# Patient Record
Sex: FEMALE | HISPANIC OR LATINO | Employment: UNEMPLOYED | URBAN - METROPOLITAN AREA
[De-identification: names, ages, dates, MRNs, and addresses within clinical notes are randomized per-mention and may not be internally consistent; named-entity substitution may affect disease eponyms.]

---

## 2017-01-19 DIAGNOSIS — Z01.812 PRE-OPERATIVE LABORATORY EXAMINATION: ICD-10-CM

## 2017-01-19 LAB
ANION GAP SERPL CALC-SCNC: 8 MMOL/L (ref 0–11.9)
BUN SERPL-MCNC: 11 MG/DL (ref 8–22)
CALCIUM SERPL-MCNC: 9.7 MG/DL (ref 8.4–10.2)
CHLORIDE SERPL-SCNC: 105 MMOL/L (ref 96–112)
CO2 SERPL-SCNC: 28 MMOL/L (ref 20–33)
CREAT SERPL-MCNC: 0.82 MG/DL (ref 0.5–1.4)
ERYTHROCYTE [DISTWIDTH] IN BLOOD BY AUTOMATED COUNT: 42 FL (ref 35.9–50)
GFR SERPL CREATININE-BSD FRML MDRD: >60 ML/MIN/1.73 M 2
GLUCOSE SERPL-MCNC: 91 MG/DL (ref 65–99)
HCT VFR BLD AUTO: 48.5 % (ref 37–47)
HGB BLD-MCNC: 16.7 G/DL (ref 12–16)
MCH RBC QN AUTO: 29.4 PG (ref 27–33)
MCHC RBC AUTO-ENTMCNC: 34.4 G/DL (ref 33.6–35)
MCV RBC AUTO: 85.4 FL (ref 81.4–97.8)
PLATELET # BLD AUTO: 221 K/UL (ref 164–446)
PMV BLD AUTO: 10 FL (ref 9–12.9)
POTASSIUM SERPL-SCNC: 4.2 MMOL/L (ref 3.6–5.5)
RBC # BLD AUTO: 5.68 M/UL (ref 4.2–5.4)
SODIUM SERPL-SCNC: 141 MMOL/L (ref 135–145)
WBC # BLD AUTO: 7.8 K/UL (ref 4.8–10.8)

## 2017-02-01 PROBLEM — H02.402 PTOSIS OF LEFT EYELID: Status: ACTIVE | Noted: 2017-02-01

## 2017-06-20 ENCOUNTER — HOSPITAL ENCOUNTER (OUTPATIENT)
Facility: MEDICAL CENTER | Age: 57
End: 2017-06-21
Attending: EMERGENCY MEDICINE | Admitting: INTERNAL MEDICINE
Payer: COMMERCIAL

## 2017-06-20 ENCOUNTER — APPOINTMENT (OUTPATIENT)
Dept: RADIOLOGY | Facility: MEDICAL CENTER | Age: 57
End: 2017-06-20
Attending: EMERGENCY MEDICINE
Payer: COMMERCIAL

## 2017-06-20 ENCOUNTER — RESOLUTE PROFESSIONAL BILLING HOSPITAL PROF FEE (OUTPATIENT)
Dept: HOSPITALIST | Facility: MEDICAL CENTER | Age: 57
End: 2017-06-20
Payer: COMMERCIAL

## 2017-06-20 DIAGNOSIS — H53.2 DIPLOPIA: ICD-10-CM

## 2017-06-20 DIAGNOSIS — R51.9 ACUTE NONINTRACTABLE HEADACHE, UNSPECIFIED HEADACHE TYPE: ICD-10-CM

## 2017-06-20 PROBLEM — G45.9 TIA (TRANSIENT ISCHEMIC ATTACK): Status: ACTIVE | Noted: 2017-06-20

## 2017-06-20 LAB
ALBUMIN SERPL BCP-MCNC: 3.9 G/DL (ref 3.2–4.9)
ALBUMIN/GLOB SERPL: 1.2 G/DL
ALP SERPL-CCNC: 48 U/L (ref 30–99)
ALT SERPL-CCNC: 68 U/L (ref 2–50)
ANION GAP SERPL CALC-SCNC: 10 MMOL/L (ref 0–11.9)
AST SERPL-CCNC: 35 U/L (ref 12–45)
BASOPHILS # BLD AUTO: 0.6 % (ref 0–1.8)
BASOPHILS # BLD: 0.05 K/UL (ref 0–0.12)
BILIRUB SERPL-MCNC: 0.6 MG/DL (ref 0.1–1.5)
BUN SERPL-MCNC: 18 MG/DL (ref 8–22)
CALCIUM SERPL-MCNC: 9.1 MG/DL (ref 8.4–10.2)
CHLORIDE SERPL-SCNC: 107 MMOL/L (ref 96–112)
CO2 SERPL-SCNC: 25 MMOL/L (ref 20–33)
CREAT SERPL-MCNC: 0.87 MG/DL (ref 0.5–1.4)
EOSINOPHIL # BLD AUTO: 0.35 K/UL (ref 0–0.51)
EOSINOPHIL NFR BLD: 4.4 % (ref 0–6.9)
ERYTHROCYTE [DISTWIDTH] IN BLOOD BY AUTOMATED COUNT: 44.5 FL (ref 35.9–50)
GFR SERPL CREATININE-BSD FRML MDRD: >60 ML/MIN/1.73 M 2
GLOBULIN SER CALC-MCNC: 3.2 G/DL (ref 1.9–3.5)
GLUCOSE SERPL-MCNC: 91 MG/DL (ref 65–99)
HCT VFR BLD AUTO: 47.6 % (ref 37–47)
HGB BLD-MCNC: 15.9 G/DL (ref 12–16)
IMM GRANULOCYTES # BLD AUTO: 0.01 K/UL (ref 0–0.11)
IMM GRANULOCYTES NFR BLD AUTO: 0.1 % (ref 0–0.9)
LYMPHOCYTES # BLD AUTO: 3.89 K/UL (ref 1–4.8)
LYMPHOCYTES NFR BLD: 49.4 % (ref 22–41)
MCH RBC QN AUTO: 29 PG (ref 27–33)
MCHC RBC AUTO-ENTMCNC: 33.4 G/DL (ref 33.6–35)
MCV RBC AUTO: 86.7 FL (ref 81.4–97.8)
MONOCYTES # BLD AUTO: 0.53 K/UL (ref 0–0.85)
MONOCYTES NFR BLD AUTO: 6.7 % (ref 0–13.4)
NEUTROPHILS # BLD AUTO: 3.04 K/UL (ref 2–7.15)
NEUTROPHILS NFR BLD: 38.8 % (ref 44–72)
NRBC # BLD AUTO: 0 K/UL
NRBC BLD AUTO-RTO: 0 /100 WBC
PLATELET # BLD AUTO: 183 K/UL (ref 164–446)
PMV BLD AUTO: 10.2 FL (ref 9–12.9)
POTASSIUM SERPL-SCNC: 3.8 MMOL/L (ref 3.6–5.5)
PROT SERPL-MCNC: 7.1 G/DL (ref 6–8.2)
RBC # BLD AUTO: 5.49 M/UL (ref 4.2–5.4)
SODIUM SERPL-SCNC: 142 MMOL/L (ref 135–145)
TROPONIN I SERPL-MCNC: <0.02 NG/ML (ref 0–0.04)
TSH SERPL DL<=0.005 MIU/L-ACNC: 0.43 UIU/ML (ref 0.35–5.5)
WBC # BLD AUTO: 7.9 K/UL (ref 4.8–10.8)

## 2017-06-20 PROCEDURE — 84443 ASSAY THYROID STIM HORMONE: CPT

## 2017-06-20 PROCEDURE — 99220 PR INITIAL OBSERVATION CARE,LEVL III: CPT | Performed by: INTERNAL MEDICINE

## 2017-06-20 PROCEDURE — 96374 THER/PROPH/DIAG INJ IV PUSH: CPT

## 2017-06-20 PROCEDURE — 96375 TX/PRO/DX INJ NEW DRUG ADDON: CPT

## 2017-06-20 PROCEDURE — 80053 COMPREHEN METABOLIC PANEL: CPT

## 2017-06-20 PROCEDURE — 85025 COMPLETE CBC W/AUTO DIFF WBC: CPT

## 2017-06-20 PROCEDURE — 84484 ASSAY OF TROPONIN QUANT: CPT

## 2017-06-20 PROCEDURE — 70450 CT HEAD/BRAIN W/O DYE: CPT

## 2017-06-20 PROCEDURE — 36415 COLL VENOUS BLD VENIPUNCTURE: CPT

## 2017-06-20 PROCEDURE — 700111 HCHG RX REV CODE 636 W/ 250 OVERRIDE (IP): Performed by: EMERGENCY MEDICINE

## 2017-06-20 PROCEDURE — G0378 HOSPITAL OBSERVATION PER HR: HCPCS

## 2017-06-20 PROCEDURE — 700105 HCHG RX REV CODE 258: Performed by: INTERNAL MEDICINE

## 2017-06-20 PROCEDURE — 93005 ELECTROCARDIOGRAM TRACING: CPT | Performed by: EMERGENCY MEDICINE

## 2017-06-20 PROCEDURE — 99285 EMERGENCY DEPT VISIT HI MDM: CPT

## 2017-06-20 RX ORDER — LEVOTHYROXINE SODIUM 0.12 MG/1
125 TABLET ORAL
Status: DISCONTINUED | OUTPATIENT
Start: 2017-06-21 | End: 2017-06-21 | Stop reason: HOSPADM

## 2017-06-20 RX ORDER — MORPHINE SULFATE 4 MG/ML
4 INJECTION, SOLUTION INTRAMUSCULAR; INTRAVENOUS ONCE
Status: COMPLETED | OUTPATIENT
Start: 2017-06-20 | End: 2017-06-20

## 2017-06-20 RX ORDER — HYDROCODONE BITARTRATE AND ACETAMINOPHEN 5; 325 MG/1; MG/1
1 TABLET ORAL EVERY 6 HOURS PRN
Status: DISCONTINUED | OUTPATIENT
Start: 2017-06-20 | End: 2017-06-21 | Stop reason: HOSPADM

## 2017-06-20 RX ORDER — KETOROLAC TROMETHAMINE 30 MG/ML
15 INJECTION, SOLUTION INTRAMUSCULAR; INTRAVENOUS EVERY 6 HOURS PRN
Status: DISCONTINUED | OUTPATIENT
Start: 2017-06-20 | End: 2017-06-21

## 2017-06-20 RX ORDER — ONDANSETRON 2 MG/ML
4 INJECTION INTRAMUSCULAR; INTRAVENOUS ONCE
Status: COMPLETED | OUTPATIENT
Start: 2017-06-20 | End: 2017-06-20

## 2017-06-20 RX ORDER — POLYETHYLENE GLYCOL 3350 17 G/17G
1 POWDER, FOR SOLUTION ORAL
Status: DISCONTINUED | OUTPATIENT
Start: 2017-06-20 | End: 2017-06-21 | Stop reason: HOSPADM

## 2017-06-20 RX ORDER — ASPIRIN 325 MG
325 TABLET ORAL DAILY
Status: DISCONTINUED | OUTPATIENT
Start: 2017-06-21 | End: 2017-06-21 | Stop reason: HOSPADM

## 2017-06-20 RX ORDER — SODIUM CHLORIDE 9 MG/ML
INJECTION, SOLUTION INTRAVENOUS CONTINUOUS
Status: DISCONTINUED | OUTPATIENT
Start: 2017-06-20 | End: 2017-06-21 | Stop reason: HOSPADM

## 2017-06-20 RX ORDER — BISACODYL 10 MG
10 SUPPOSITORY, RECTAL RECTAL
Status: DISCONTINUED | OUTPATIENT
Start: 2017-06-20 | End: 2017-06-21 | Stop reason: HOSPADM

## 2017-06-20 RX ORDER — AMOXICILLIN 250 MG
2 CAPSULE ORAL 2 TIMES DAILY
Status: DISCONTINUED | OUTPATIENT
Start: 2017-06-20 | End: 2017-06-21 | Stop reason: HOSPADM

## 2017-06-20 RX ORDER — HEPARIN SODIUM 5000 [USP'U]/ML
5000 INJECTION, SOLUTION INTRAVENOUS; SUBCUTANEOUS EVERY 8 HOURS
Status: DISCONTINUED | OUTPATIENT
Start: 2017-06-21 | End: 2017-06-21 | Stop reason: HOSPADM

## 2017-06-20 RX ORDER — ATORVASTATIN CALCIUM 40 MG/1
20 TABLET, FILM COATED ORAL
Status: DISCONTINUED | OUTPATIENT
Start: 2017-06-20 | End: 2017-06-21 | Stop reason: HOSPADM

## 2017-06-20 RX ADMIN — ONDANSETRON 4 MG: 2 INJECTION INTRAMUSCULAR; INTRAVENOUS at 20:15

## 2017-06-20 RX ADMIN — SODIUM CHLORIDE: 9 INJECTION, SOLUTION INTRAVENOUS at 21:45

## 2017-06-20 RX ADMIN — MORPHINE SULFATE 4 MG: 4 INJECTION INTRAVENOUS at 20:15

## 2017-06-20 ASSESSMENT — PAIN SCALES - GENERAL
PAINLEVEL_OUTOF10: 7
PAINLEVEL_OUTOF10: 5

## 2017-06-20 NOTE — IP AVS SNAPSHOT
" Home Care Instructions                                                                                                                  Name:Ashanti Arvizu  Medical Record Number:[id number]  CSN: [id number]    YOB: 1960   Age: 57 y.o.  Sex: female  HT:1.575 m (5' 2\") WT: 98.7 kg (217 lb 9.5 oz)          Admit Date: 6/20/2017     Discharge Date:   Today's Date: 6/21/2017  Attending Doctor:  Wilner Meng*                  Allergies:  Sulfa drugs; Lasix; and Tape            Discharge Instructions       Discharge Instructions    Discharged to home by car with relative. Discharged via wheelchair, hospital escort: Yes.  Special equipment needed: Not Applicable    Be sure to schedule a follow-up appointment with your primary care doctor or any specialists as instructed.     Discharge Plan:   Diet Plan: Discussed  Activity Level: Discussed  Smoking Cessation Offered: Patient Refused  Confirmed Follow up Appointment: Patient to Call and Schedule Appointment  Confirmed Symptoms Management: Discussed  Medication Reconciliation Updated: Yes  Influenza Vaccine Indication: Patient Refuses    I understand that a diet low in cholesterol, fat, and sodium is recommended for good health. Unless I have been given specific instructions below for another diet, I accept this instruction as my diet prescription.   Diplopia  Diplopia is the condition of having double vision or seeing two of a single object. There are many causes of diplopia. Some are not dangerous and can be easily corrected. Diplopia may also be a symptom of a serious medical problem.  There are two types of diplopia.  · Monocular diplopia. This is double vision that affects only one eye. Monocular diplopia is often caused by a clouding of the lens in your eye (cataract) or by disruptions in the way that your eye focuses light.  · Binocular diplopia. This is double vision that affects both eyes. However, when you shut one eye, the double vision will go " away. Binocular diplopia may be more serious. It can be caused by:  ¨ Problems with the nerves or muscles that are responsible for eye movement.  ¨ Neurologic diseases.  ¨ Thyroid problems.  ¨ Tumors.  ¨ An infection near your eyes.  ¨ A stroke.  You may need to see a health care provider who specializes in eye conditions (ophthalmologist) or a nerve specialist (neurologist) to find the cause.  HOME CARE INSTRUCTIONS  · Tell your health care provider about any changes in your vision.  · Do not drive or operate heavy machinery if diplopia interferes with your vision.  · Keep all follow-up visits as directed by your health care provider. This is important.  SEEK MEDICAL CARE IF:  · Your diplopia gets worse.  · You develop any other symptoms along with your diplopia, such as:  ¨ Weakness.  ¨ Numbness.  ¨ Headache.  ¨ Eye pain.  ¨ Clumsiness.  ¨ Nausea.  ¨ Drooping eyelids.  ¨ Abnormal movement of one of your eyes.  SEEK IMMEDIATE MEDICAL CARE IF:  · You have sudden vision loss.  · You suddenly get a very bad headache.  · You have sudden weakness or numbness.  · You suddenly lose the ability to speak, understand speech, or both.     This information is not intended to replace advice given to you by your health care provider. Make sure you discuss any questions you have with your health care provider.     Document Released: 10/19/2005 Document Revised: 05/03/2016 Document Reviewed: 11/11/2015  [employer] Interactive Patient Education ©2016 [employer]    · Is patient discharged on Warfarin / Coumadin?   No     · Is patient Post Blood Transfusion?  No    Depression / Suicide Risk    As you are discharged from this [facility] facility, it is important to learn how to keep safe from harming yourself.    Recognize the warning signs:  · Abrupt changes in personality, positive or negative- including increase in energy   · Giving away possessions  · Change in eating patterns- significant weight changes-  positive or  negative  · Change in sleeping patterns- unable to sleep or sleeping all the time   · Unwillingness or inability to communicate  · Depression  · Unusual sadness, discouragement and loneliness  · Talk of wanting to die  · Neglect of personal appearance   · Rebelliousness- reckless behavior  · Withdrawal from people/activities they love  · Confusion- inability to concentrate     If you or a loved one observes any of these behaviors or has concerns about self-harm, here's what you can do:  · Talk about it- your feelings and reasons for harming yourself  · Remove any means that you might use to hurt yourself (examples: pills, rope, extension cords, firearm)  · Get professional help from the community (Mental Health, Substance Abuse, psychological counseling)  · Do not be alone:Call your Safe Contact- someone whom you trust who will be there for you.  · Call your local CRISIS HOTLINE 516-4023 or 629-357-0154  · Call your local Children's Mobile Crisis Response Team [place] (604) 941-1665 or www.[employer]  · Call the toll free National Suicide Prevention Hotlines   · National Suicide Prevention Lifeline 142-957-HUIG (4056)  · National Hope Line Network 800-SUICIDE (781-6334)        Follow-up Information     1. Follow up with Jamaal Jarquin M.D. In 2 weeks.    Specialty:  Neurology    Why:  Per Dr. Jarquin's office pt to call stating needing hospital follow up and they will open an appointment to fit you in sooner than if [facility] schedules for you    Contact information    [address]  [address]  [place] [address] [phone]          2. Follow up with Jennifer Cardoso D.O.. Go on 6/28/2017.    Specialty:  Family Medicine    Why:  Please arrive at 2:50pm for your appointment.    Contact information    [address]  [address]  [place] [address] [phone]           Discharge Medication Instructions:    Below are the medications your physician expects you to take upon discharge:    Review all  your home medications and newly ordered medications with your doctor and/or pharmacist. Follow medication instructions as directed by your doctor and/or pharmacist.    Please keep your medication list with you and share with your physician.               Medication List      CONTINUE taking these medications        Instructions    Morning Afternoon Evening Bedtime    alprazolam 0.5 MG Tabs   Commonly known as:  XANAX        Take 0.5 mg by mouth at bedtime as needed for Sleep.   Dose:  0.5 mg                        fish oil 1000 MG Caps capsule        Take 1,000 mg by mouth every day.   Dose:  1000 mg                        flurazepam 30 MG capsule   Commonly known as:  DALMANE        Take 30 mg by mouth at bedtime as needed for Sleep.   Dose:  30 mg                        levothyroxine 125 MCG Tabs   Last time this was given:  125 mcg on 6/21/2017 10:54 AM   Commonly known as:  SYNTHROID        Take 125 mcg by mouth Every morning on an empty stomach.   Dose:  125 mcg                        nitrofurantoin 50 MG Caps   Commonly known as:  MACRODANTIN        Take 50 mg by mouth as needed.   Dose:  50 mg                        VITAMIN B COMPLEX PO        Take 1 Tab by mouth every day.   Dose:  1 Tab                        vitamin D 1000 UNIT Tabs   Commonly known as:  cholecalciferol        Take 1,000 Units by mouth every day.   Dose:  1000 Units                                Instructions           Diet / Nutrition:    Follow any diet instructions given to you by your doctor or the dietician, including how much salt (sodium) you are allowed each day.    If you are overweight, talk to your doctor about a weight reduction plan.    Activity:    Remain physically active following your doctor's instructions about exercise and activity.    Rest often.     Any time you become even a little tired or short of breath, SIT DOWN and rest.    Worsening Symptoms:    Report any of the following signs and symptoms to the doctor's  office immediately:    *Pain of jaw, arm, or neck  *Chest pain not relieved by medication                               *Dizziness or loss of consciousness  *Difficulty breathing even when at rest   *More tired than usual                                       *Bleeding drainage or swelling of surgical site  *Swelling of feet, ankles, legs or stomach                 *Fever (>100ºF)  *Pink or blood tinged sputum  *Weight gain (3lbs/day or 5lbs /week)           *Shock from internal defibrillator (if applicable)  *Palpitations or irregular heartbeats                *Cool and/or numb extremities    Stroke Awareness    Common Risk Factors for Stroke include:    Age  Atrial Fibrillation  Carotid Artery Stenosis  Diabetes Mellitus  Excessive alcohol consumption  High blood pressure  Overweight   Physical inactivity  Smoking    Warning signs and symptoms of a stroke include:    *Sudden numbness or weakness of the face, arm or leg (especially on one side of the body).  *Sudden confusion, trouble speaking or understanding.  *Sudden trouble seeing in one or both eyes.  *Sudden trouble walking, dizziness, loss of balance or coordination.Sudden severe headache with no known cause.    It is very important to get treatment quickly when a stroke occurs. If you experience any of the above warning signs, call 911 immediately.                   Disclaimer         Quit Smoking / Tobacco Use:    I understand the use of any tobacco products increases my chance of suffering from future heart disease or stroke and could cause other illnesses which may shorten my life. Quitting the use of tobacco products is the single most important thing I can do to improve my health. For further information on smoking / tobacco cessation call a Toll Free Quit Line at 1-978.831.5167 (*[employer] [place]) or 1-800.843.8478 (American Lung Association) or you can access the web based program at www.lungusa.org.    [place] Tobacco Users Help Line:  (913)  878-3052       Toll Free: 4-119-700-2462  Quit Tobacco Program [facility] (793)298-8492    Crisis Hotline:    [place] Crisis Hotline:  7-095-SXTFNCK or 1-584.943.3571    [place] Crisis Hotline:    1-453.285.8252 or 937-510-7979    Discharge Survey:   Thank you for choosing [facility]. We hope we did everything we could to make your hospital stay a pleasant one. You may be receiving a phone survey and we would appreciate your time and participation in answering the questions. Your input is very valuable to us in our efforts to improve our service to our patients and their families.        My signature on this form indicates that:    1. I have reviewed and understand the above information.  2. My questions regarding this information have been answered to my satisfaction.  3. I have formulated a plan with my discharge nurse to obtain my prescribed medications for home.                  Disclaimer         __________________________________                     __________       ________                       Patient Signature                                                 Date                    Time

## 2017-06-20 NOTE — IP AVS SNAPSHOT
" <p align=\"LEFT\"><IMG SRC=\"//[email or web address]$/[email or web address]\" alt=\"Image\" WIDTH=\"50%\" HEIGHT=\"200\" BORDER=\"\"></p>                   Name:Ashanti Arvizu  Medical Record Number:[id number]  CSN: [id number]    YOB: 1960   Age: 57 y.o.  Sex: female  HT:1.575 m (5' 2\") WT: 98.7 kg (217 lb 9.5 oz)          Admit Date: 6/20/2017     Discharge Date:   Today's Date: 6/21/2017  Attending Doctor:  Wilner Meng*                  Allergies:  Sulfa drugs; Lasix; and Tape          Follow-up Information     1. Follow up with Jamaal Jarquin M.D. In 2 weeks.    Specialty:  Neurology    Why:  Per Dr. Jarquin's office pt to call stating needing hospital follow up and they will open an appointment to fit you in sooner than if [facility] schedules for you    Contact information    [address]  [address]  [place] [address] [phone]          2. Follow up with Jennifer Cardoso D.O.. Go on 6/28/2017.    Specialty:  Family Medicine    Why:  Please arrive at 2:50pm for your appointment.    Contact information    [address]  [address]  [place] [address] [phone]           Medication List      Take these Medications        Instructions    alprazolam 0.5 MG Tabs   Commonly known as:  XANAX    Take 0.5 mg by mouth at bedtime as needed for Sleep.   Dose:  0.5 mg       fish oil 1000 MG Caps capsule    Take 1,000 mg by mouth every day.   Dose:  1000 mg       flurazepam 30 MG capsule   Commonly known as:  DALMANE    Take 30 mg by mouth at bedtime as needed for Sleep.   Dose:  30 mg       levothyroxine 125 MCG Tabs   Commonly known as:  SYNTHROID    Take 125 mcg by mouth Every morning on an empty stomach.   Dose:  125 mcg       nitrofurantoin 50 MG Caps   Commonly known as:  MACRODANTIN    Take 50 mg by mouth as needed.   Dose:  50 mg       VITAMIN B COMPLEX PO    Take 1 Tab by mouth every day.   Dose:  1 Tab       vitamin D 1000 UNIT Tabs   Commonly known as:  cholecalciferol    Take 1,000 Units by mouth every day.   "   Dose:  1000 Units

## 2017-06-20 NOTE — IP AVS SNAPSHOT
HSTYLE Access Code: [email or web address]  Expires: 7/13/2017  4:31 AM    HSTYLE  A secure, online tool to manage your health information     [employer] HSTYLE® is a secure, online tool that connects you to your personalized health information from the privacy of your home -- day or night - making it very easy for you to manage your healthcare. Once the activation process is completed, you can even access your medical information using the HSTYLE tiffanie, which is available for free in the Apple Tiffanie store or Google Play store.     HSTYLE provides the following levels of access (as shown below):   My Chart Features   [facility] Primary Care Doctor [facility]  Specialists [facility]  Urgent  Care Non-[facility]  Primary Care  Doctor   Email your healthcare team securely and privately 24/7 X X X X   Manage appointments: schedule your next appointment; view details of past/upcoming appointments X      Request prescription refills. X      View recent personal medical records, including lab and immunizations X X X X   View health record, including health history, allergies, medications X X X X   Read reports about your outpatient visits, procedures, consult and ER notes X X X X   See your discharge summary, which is a recap of your hospital and/or ER visit that includes your diagnosis, lab results, and care plan. X X       How to register for HSTYLE:  1. Go to  [email or web address].  2. Click on the Sign Up Now box, which takes you to the New Member Sign Up page. You will need to provide the following information:  a. Enter your HSTYLE Access Code exactly as it appears at the top of this page. (You will not need to use this code after you’ve completed the sign-up process. If you do not sign up before the expiration date, you must request a new code.)   b. Enter your date of birth.   c. Enter your home email address.   d. Click Submit, and follow the next screen’s instructions.  3. Create a HSTYLE ID. This will be your HSTYLE  login ID and cannot be changed, so think of one that is secure and easy to remember.  4. Create a [employer] password. You can change your password at any time.  5. Enter your Password Reset Question and Answer. This can be used at a later time if you forget your password.   6. Enter your e-mail address. This allows you to receive e-mail notifications when new information is available in [employer].  7. Click Sign Up. You can now view your health information.    For assistance activating your [email or web address] account, call [phone]

## 2017-06-21 ENCOUNTER — PATIENT OUTREACH (OUTPATIENT)
Dept: HEALTH INFORMATION MANAGEMENT | Facility: OTHER | Age: 57
End: 2017-06-21

## 2017-06-21 ENCOUNTER — APPOINTMENT (OUTPATIENT)
Dept: RADIOLOGY | Facility: MEDICAL CENTER | Age: 57
End: 2017-06-21
Attending: INTERNAL MEDICINE
Payer: COMMERCIAL

## 2017-06-21 VITALS
HEIGHT: 62 IN | BODY MASS INDEX: 40.04 KG/M2 | DIASTOLIC BLOOD PRESSURE: 51 MMHG | RESPIRATION RATE: 16 BRPM | OXYGEN SATURATION: 94 % | HEART RATE: 66 BPM | TEMPERATURE: 97.5 F | SYSTOLIC BLOOD PRESSURE: 100 MMHG | WEIGHT: 217.59 LBS

## 2017-06-21 LAB
ALBUMIN SERPL BCP-MCNC: 3.5 G/DL (ref 3.2–4.9)
ALBUMIN/GLOB SERPL: 1.3 G/DL
ALP SERPL-CCNC: 43 U/L (ref 30–99)
ALT SERPL-CCNC: 62 U/L (ref 2–50)
ANION GAP SERPL CALC-SCNC: 8 MMOL/L (ref 0–11.9)
AST SERPL-CCNC: 37 U/L (ref 12–45)
BILIRUB SERPL-MCNC: 0.6 MG/DL (ref 0.1–1.5)
BUN SERPL-MCNC: 18 MG/DL (ref 8–22)
CALCIUM SERPL-MCNC: 8.5 MG/DL (ref 8.4–10.2)
CHLORIDE SERPL-SCNC: 109 MMOL/L (ref 96–112)
CHOLEST SERPL-MCNC: 203 MG/DL (ref 100–199)
CO2 SERPL-SCNC: 26 MMOL/L (ref 20–33)
CREAT SERPL-MCNC: 0.81 MG/DL (ref 0.5–1.4)
ERYTHROCYTE [DISTWIDTH] IN BLOOD BY AUTOMATED COUNT: 45.1 FL (ref 35.9–50)
ERYTHROCYTE [SEDIMENTATION RATE] IN BLOOD BY WESTERGREN METHOD: 17 MM/HOUR (ref 0–30)
GFR SERPL CREATININE-BSD FRML MDRD: >60 ML/MIN/1.73 M 2
GLOBULIN SER CALC-MCNC: 2.8 G/DL (ref 1.9–3.5)
GLUCOSE SERPL-MCNC: 101 MG/DL (ref 65–99)
HCT VFR BLD AUTO: 43.5 % (ref 37–47)
HDLC SERPL-MCNC: 46 MG/DL
HGB BLD-MCNC: 14.6 G/DL (ref 12–16)
LDLC SERPL CALC-MCNC: 128 MG/DL
LV EJECT FRACT  99904: 60
MCH RBC QN AUTO: 28.9 PG (ref 27–33)
MCHC RBC AUTO-ENTMCNC: 33.6 G/DL (ref 33.6–35)
MCV RBC AUTO: 86 FL (ref 81.4–97.8)
PLATELET # BLD AUTO: 171 K/UL (ref 164–446)
PMV BLD AUTO: 10.6 FL (ref 9–12.9)
POTASSIUM SERPL-SCNC: 3.6 MMOL/L (ref 3.6–5.5)
PROT SERPL-MCNC: 6.3 G/DL (ref 6–8.2)
RBC # BLD AUTO: 5.06 M/UL (ref 4.2–5.4)
SODIUM SERPL-SCNC: 143 MMOL/L (ref 135–145)
TRIGL SERPL-MCNC: 146 MG/DL (ref 0–149)
WBC # BLD AUTO: 7.2 K/UL (ref 4.8–10.8)

## 2017-06-21 PROCEDURE — A9270 NON-COVERED ITEM OR SERVICE: HCPCS | Performed by: HOSPITALIST

## 2017-06-21 PROCEDURE — 70551 MRI BRAIN STEM W/O DYE: CPT

## 2017-06-21 PROCEDURE — 93880 EXTRACRANIAL BILAT STUDY: CPT

## 2017-06-21 PROCEDURE — 85652 RBC SED RATE AUTOMATED: CPT

## 2017-06-21 PROCEDURE — A9270 NON-COVERED ITEM OR SERVICE: HCPCS | Performed by: INTERNAL MEDICINE

## 2017-06-21 PROCEDURE — 94760 N-INVAS EAR/PLS OXIMETRY 1: CPT

## 2017-06-21 PROCEDURE — 700111 HCHG RX REV CODE 636 W/ 250 OVERRIDE (IP): Performed by: INTERNAL MEDICINE

## 2017-06-21 PROCEDURE — 700102 HCHG RX REV CODE 250 W/ 637 OVERRIDE(OP): Performed by: HOSPITALIST

## 2017-06-21 PROCEDURE — 96375 TX/PRO/DX INJ NEW DRUG ADDON: CPT

## 2017-06-21 PROCEDURE — G8978 MOBILITY CURRENT STATUS: HCPCS | Mod: CJ

## 2017-06-21 PROCEDURE — 700102 HCHG RX REV CODE 250 W/ 637 OVERRIDE(OP): Performed by: INTERNAL MEDICINE

## 2017-06-21 PROCEDURE — G8997 SWALLOW GOAL STATUS: HCPCS | Mod: CH

## 2017-06-21 PROCEDURE — 99217 PR OBSERVATION CARE DISCHARGE: CPT | Performed by: HOSPITALIST

## 2017-06-21 PROCEDURE — 85027 COMPLETE CBC AUTOMATED: CPT

## 2017-06-21 PROCEDURE — 700111 HCHG RX REV CODE 636 W/ 250 OVERRIDE (IP): Performed by: HOSPITALIST

## 2017-06-21 PROCEDURE — 97162 PT EVAL MOD COMPLEX 30 MIN: CPT

## 2017-06-21 PROCEDURE — 80061 LIPID PANEL: CPT

## 2017-06-21 PROCEDURE — G0378 HOSPITAL OBSERVATION PER HR: HCPCS

## 2017-06-21 PROCEDURE — 93306 TTE W/DOPPLER COMPLETE: CPT

## 2017-06-21 PROCEDURE — G8979 MOBILITY GOAL STATUS: HCPCS | Mod: CI

## 2017-06-21 PROCEDURE — 92610 EVALUATE SWALLOWING FUNCTION: CPT

## 2017-06-21 PROCEDURE — G8996 SWALLOW CURRENT STATUS: HCPCS | Mod: CI

## 2017-06-21 PROCEDURE — 93306 TTE W/DOPPLER COMPLETE: CPT | Mod: 26 | Performed by: INTERNAL MEDICINE

## 2017-06-21 PROCEDURE — 700111 HCHG RX REV CODE 636 W/ 250 OVERRIDE (IP): Performed by: NURSE PRACTITIONER

## 2017-06-21 PROCEDURE — 80053 COMPREHEN METABOLIC PANEL: CPT

## 2017-06-21 RX ORDER — IBUPROFEN 200 MG
800 TABLET ORAL EVERY 6 HOURS PRN
Status: DISCONTINUED | OUTPATIENT
Start: 2017-06-21 | End: 2017-06-21 | Stop reason: HOSPADM

## 2017-06-21 RX ORDER — DIPHENHYDRAMINE HYDROCHLORIDE 50 MG/ML
25 INJECTION INTRAMUSCULAR; INTRAVENOUS ONCE
Status: COMPLETED | OUTPATIENT
Start: 2017-06-21 | End: 2017-06-21

## 2017-06-21 RX ORDER — LORAZEPAM 2 MG/ML
1 INJECTION INTRAMUSCULAR ONCE
Status: COMPLETED | OUTPATIENT
Start: 2017-06-21 | End: 2017-06-21

## 2017-06-21 RX ADMIN — HEPARIN SODIUM 5000 UNITS: 5000 INJECTION, SOLUTION INTRAVENOUS; SUBCUTANEOUS at 06:15

## 2017-06-21 RX ADMIN — KETOROLAC TROMETHAMINE 15 MG: 30 INJECTION, SOLUTION INTRAMUSCULAR; INTRAVENOUS at 04:03

## 2017-06-21 RX ADMIN — LEVOTHYROXINE SODIUM 125 MCG: 125 TABLET ORAL at 10:54

## 2017-06-21 RX ADMIN — ASPIRIN 325 MG ORAL TABLET 325 MG: 325 PILL ORAL at 10:54

## 2017-06-21 RX ADMIN — LORAZEPAM 1 MG: 2 INJECTION INTRAMUSCULAR; INTRAVENOUS at 08:09

## 2017-06-21 RX ADMIN — DIPHENHYDRAMINE HYDROCHLORIDE 25 MG: 50 INJECTION, SOLUTION INTRAMUSCULAR; INTRAVENOUS at 06:49

## 2017-06-21 RX ADMIN — IBUPROFEN 800 MG: 200 TABLET, FILM COATED ORAL at 12:33

## 2017-06-21 ASSESSMENT — COPD QUESTIONNAIRES
COPD SCREENING SCORE: 3
HAVE YOU SMOKED AT LEAST 100 CIGARETTES IN YOUR ENTIRE LIFE: YES
DURING THE PAST 4 WEEKS HOW MUCH DID YOU FEEL SHORT OF BREATH: NONE/LITTLE OF THE TIME
DO YOU EVER COUGH UP ANY MUCUS OR PHLEGM?: NO/ONLY WITH OCCASIONAL COLDS OR INFECTIONS

## 2017-06-21 ASSESSMENT — LIFESTYLE VARIABLES
EVER_SMOKED: NEVER
ALCOHOL_USE: NO
EVER_SMOKED: YES

## 2017-06-21 ASSESSMENT — GAIT ASSESSMENTS
GAIT LEVEL OF ASSIST: STAND BY ASSIST
DEVIATION: INCREASED BASE OF SUPPORT
DISTANCE (FEET): 300

## 2017-06-21 ASSESSMENT — PAIN SCALES - GENERAL
PAINLEVEL_OUTOF10: 0
PAINLEVEL_OUTOF10: 2

## 2017-06-21 NOTE — PROGRESS NOTES
0815Report received, plan of care reviewed and discussed, assessment complete, oriented to room, bed alarm on, nonskid socks applied, advised to call for assistance.   1015 Discussed plan of care, encouraged and answered all questions, will continue to monitor.  1215 Up to bathroom, all needs met at this time.  1415 Discussed plans for discharge, MD at bedside, will continue to monitor.  Cardiac summary.  Sinus rhythm/sinus bradycardia (0.16/0.08/0.46)

## 2017-06-21 NOTE — DISCHARGE PLANNING
Medical Social Work    Referral: Pt discussed at IDT rounds this AM.    Intervention: Per flowsheet, pt lives with spouse and adult child and expects to d.c home.  No SS needs identified nor any requests for LILIA Garrison during rounds.      Plan: LILIA available for any assistance with d.c planning.    Care Transition Team Assessment    Information Source  Orientation : Oriented x 4  Information Given By: Patient    Readmission Evaluation  Is this a readmission?: No    Elopement Risk  Legal Hold: No  Ambulatory or Self Mobile in Wheelchair: No-Not an Elopement Risk  Elopement Risk: Not at Risk for Elopement    Interdisciplinary Discharge Planning  Does Admitting Nurse Feel This Could be a Complex Discharge?: No  Primary Care Physician: Jennifer Cardoso  Lives with - Patient's Self Care Capacity: Spouse, Adult Children  Patient or legal guardian wants to designate a caregiver (see row info): No  Support Systems: Children, Spouse / Significant Other, Friends / Neighbors  Housing / Facility: [address]  Do You Take your Prescribed Medications Regularly: Yes  Able to Return to Previous ADL's: Yes  Mobility Issues: No  Prior Services: None, Home-Independent  Patient Expects to be Discharged to:: Home  Assistance Needed: No  Durable Medical Equipment: Not Applicable    Discharge Preparedness  What is your plan after discharge?: Home with help  What are your discharge supports?: Spouse, Child         Finances  Prescription Coverage: Yes    Vision / Hearing Impairment  Vision Impairment : Yes  Right Eye Vision: Impaired, Wears Glasses  Left Eye Vision: Impaired, Wears Glasses  Hearing Impairment : No    Values / Beliefs / Concerns  Values / Beliefs Concerns : No    Advance Directive  Advance Directive?: None    Domestic Abuse  Have you ever been the victim of abuse or violence?: No    Psychological Assessment  History of Substance Abuse: None         Anticipated Discharge Information  Anticipated discharge disposition:  Home

## 2017-06-21 NOTE — DISCHARGE INSTRUCTIONS
Discharge Instructions    Discharged to home by car with relative. Discharged via wheelchair, hospital escort: Yes.  Special equipment needed: Not Applicable    Be sure to schedule a follow-up appointment with your primary care doctor or any specialists as instructed.     Discharge Plan:   Diet Plan: Discussed  Activity Level: Discussed  Smoking Cessation Offered: Patient Refused  Confirmed Follow up Appointment: Patient to Call and Schedule Appointment  Confirmed Symptoms Management: Discussed  Medication Reconciliation Updated: Yes  Influenza Vaccine Indication: Patient Refuses    I understand that a diet low in cholesterol, fat, and sodium is recommended for good health. Unless I have been given specific instructions below for another diet, I accept this instruction as my diet prescription.   Diplopia  Diplopia is the condition of having double vision or seeing two of a single object. There are many causes of diplopia. Some are not dangerous and can be easily corrected. Diplopia may also be a symptom of a serious medical problem.  There are two types of diplopia.  · Monocular diplopia. This is double vision that affects only one eye. Monocular diplopia is often caused by a clouding of the lens in your eye (cataract) or by disruptions in the way that your eye focuses light.  · Binocular diplopia. This is double vision that affects both eyes. However, when you shut one eye, the double vision will go away. Binocular diplopia may be more serious. It can be caused by:  ¨ Problems with the nerves or muscles that are responsible for eye movement.  ¨ Neurologic diseases.  ¨ Thyroid problems.  ¨ Tumors.  ¨ An infection near your eyes.  ¨ A stroke.  You may need to see a health care provider who specializes in eye conditions (ophthalmologist) or a nerve specialist (neurologist) to find the cause.  HOME CARE INSTRUCTIONS  · Tell your health care provider about any changes in your vision.  · Do not drive or operate heavy  machinery if diplopia interferes with your vision.  · Keep all follow-up visits as directed by your health care provider. This is important.  SEEK MEDICAL CARE IF:  · Your diplopia gets worse.  · You develop any other symptoms along with your diplopia, such as:  ¨ Weakness.  ¨ Numbness.  ¨ Headache.  ¨ Eye pain.  ¨ Clumsiness.  ¨ Nausea.  ¨ Drooping eyelids.  ¨ Abnormal movement of one of your eyes.  SEEK IMMEDIATE MEDICAL CARE IF:  · You have sudden vision loss.  · You suddenly get a very bad headache.  · You have sudden weakness or numbness.  · You suddenly lose the ability to speak, understand speech, or both.     This information is not intended to replace advice given to you by your health care provider. Make sure you discuss any questions you have with your health care provider.     Document Released: 10/19/2005 Document Revised: 05/03/2016 Document Reviewed: 11/11/2015  [employer] Interactive Patient Education ©2016 [employer]    · Is patient discharged on Warfarin / Coumadin?   No     · Is patient Post Blood Transfusion?  No    Depression / Suicide Risk    As you are discharged from this [facility] facility, it is important to learn how to keep safe from harming yourself.    Recognize the warning signs:  · Abrupt changes in personality, positive or negative- including increase in energy   · Giving away possessions  · Change in eating patterns- significant weight changes-  positive or negative  · Change in sleeping patterns- unable to sleep or sleeping all the time   · Unwillingness or inability to communicate  · Depression  · Unusual sadness, discouragement and loneliness  · Talk of wanting to die  · Neglect of personal appearance   · Rebelliousness- reckless behavior  · Withdrawal from people/activities they love  · Confusion- inability to concentrate     If you or a loved one observes any of these behaviors or has concerns about self-harm, here's what you can do:  · Talk about it- your feelings and  reasons for harming yourself  · Remove any means that you might use to hurt yourself (examples: pills, rope, extension cords, firearm)  · Get professional help from the community (Mental Health, Substance Abuse, psychological counseling)  · Do not be alone:Call your Safe Contact- someone whom you trust who will be there for you.  · Call your local CRISIS HOTLINE 655-9346 or 652-407-8917  · Call your local Children's Mobile Crisis Response Team [place] (946) 558-0962 or www.[employer]sevc[employer].ru  · Call the toll free National Suicide Prevention Hotlines   · National Suicide Prevention Lifeline 706-031-XULC (2764)  · National Hope Line Network 800-SUICIDE (203-4194)

## 2017-06-21 NOTE — ED NOTES
IV started in RAC with 20g x1 attempt. Labs drawn from start, sent to lab for processing. IV flushed with NS without difficulty. Pt tolerated well.

## 2017-06-21 NOTE — ED NOTES
Pt  to head home and get home medications information. Pt unaware of meds/doses. Pt requesting something to eat. Advised of NPO status. Pt requesting non-narcotic pain medication. ERP notified. Awaiting new orders.

## 2017-06-21 NOTE — ED PROVIDER NOTES
ED Provider Note    CHIEF COMPLAINT  Chief Complaint   Patient presents with   • Diplopia     started Sunday   • Headache     started last week       HPI  Ashanti Arvizu is a 57 y.o. female who presents to the ER.  Claims a headache and diplopia.  The patient states she has a headache that started last week.  This is a gradual onset.  It has become more and more severe.  It is her entire head.  It is throbbing in nature and is behind her eyes.  The patient states she has a history of chronic headaches that are minor headaches but this one is different.  This was not an abrupt onset headache.  This is not the worse headache of her life.  She denies any trauma.  She has any fevers or chills or neck stiffness.  She denies any focal numbness, tingling or weakness.  On Sunday, 2 days ago she developed some diplopia that is worse when she looks to the right.  She denies any other acute concerns or complaints.  She's not had diplopia in the past.    REVIEW OF SYSTEMS  See HPI for further details. All other systems are negative.    PAST MEDICAL HISTORY  Past Medical History   Diagnosis Date   • Disorder of thyroid    • Heart burn    • Psychiatric problem      anxiety       FAMILY HISTORY  History reviewed. No pertinent family history.    SOCIAL HISTORY  Social History     Social History   • Marital Status:      Spouse Name: N/A   • Number of Children: N/A   • Years of Education: N/A     Social History Main Topics   • Smoking status: Current Some Day Smoker   • Smokeless tobacco: Current User      Comment: e cigarettes   • Alcohol Use: 0.0 oz/week     0 Standard drinks or equivalent per week   • Drug Use: No   • Sexual Activity: Not Asked     Other Topics Concern   • None     Social History Narrative       SURGICAL HISTORY  Past Surgical History   Procedure Laterality Date   • Gastric banding laparoscopic  2011   • Rebecca by laparoscopy  2014     removal lap band   • Mammoplasty augmentation     • Tonsillectomy  1961   •  "Appendectomy child     • Hysterectomy, total abdominal     • Ptosis repair Left 2/1/2017     Procedure: PTOSIS REPAIR - UPPER (POSTERIOR APPROACH);  Surgeon: Stu Malik M.D.;  Location: [facility];  Service:        CURRENT MEDICATIONS  Home Medications     **Home medications have not yet been reviewed for this encounter**          ALLERGIES  Allergies   Allergen Reactions   • Sulfa Drugs Swelling     Throat and rash   • Tape Swelling       PHYSICAL EXAM  VITAL SIGNS: /82 mmHg  Pulse 74  Temp(Src) 36.7 °C (98 °F)  Resp 16  Ht 1.575 m (5' 2\")  Wt 98.7 kg (217 lb 9.5 oz)  BMI 39.79 kg/m2  SpO2 96%   Constitutional: Well developed, Well nourished, No acute distress, Non-toxic appearance.   HENT: Normocephalic, Atraumatic, Bilateral external ears normal, Oropharynx moist, No oral exudates, Nose normal.   Eyes: PERRL, EOMI, Conjunctiva normal, No discharge.   Neck: Normal range of motion, No tenderness, Supple, No stridor.   Cardiovascular: Normal heart rate, Normal rhythm, No murmurs, No rubs, No gallops.   Thorax & Lungs: Normal breath sounds, No respiratory distress, No wheezing.   Abdomen: Bowel sounds normal, Soft, No tenderness,  Skin: Warm, Dry, No erythema, No rash.   Back: No tenderness, No CVA tenderness.   Musculoskeletal: Good range of motion in all major joints.   Neurologic: Alert & oriented x 3,No focal deficits noted.   Nerves are intact  Psychiatric: Affect anxious    EKG  EKG Interpretation    Interpreted by me    Rhythm: normal sinus   Rate: normal  Axis: normal  Ectopy: none  Conduction: normal  ST Segments: no acute change  T Waves: no acute change  Q Waves: none    Clinical Impression: no acute changes and normal EKG    RADIOLOGY/PROCEDURES  CT-HEAD W/O   Final Result      No acute intracranial abnormality.            COURSE & MEDICAL DECISION MAKING  Pertinent Labs & Imaging studies reviewed. (See chart for details)  Patient presents to the acute side and associated " diplopia.  Right initial diagnosis was considered including but not limited to, brain mass, migraine, TIA, CVA, hemorrhagic.    The patient has a history of chronic A. fib.  This was different.  No thunderclap headaches.  No signs of meningitis or hemorrhage.  I don't think she requires a lumbar puncture.  She is given some pain medicine that she be reassessed.    Because of her diplopia.  She requires an MRI and inpatient workup.  I spoke with the hospitalist will see the patient.  Patient is admitted to the hospitalist in guarded condition.    FINAL IMPRESSION  1. Acute nonintractable headache, unspecified headache type    2. Diplopia          2.   3.           Electronically signed by: Hari Odell, 6/20/2017 7:42 PM

## 2017-06-21 NOTE — PROGRESS NOTES
Pt arrived on floor. Settled into bed with safety precautions in place. Assessment completed. See flowsheet.

## 2017-06-21 NOTE — CARE PLAN
Problem: Safety  Goal: Will remain free from falls  Outcome: PROGRESSING AS EXPECTED  Pt encouraged to call for assistance as needed. Safety precautions in place.    Problem: Knowledge Deficit  Goal: Knowledge of disease process/condition, treatment plan, diagnostic tests, and medications will improve  Outcome: PROGRESSING AS EXPECTED  Pt educated on disease process and plan of care. Pt's questions answered regarding plan of care.

## 2017-06-21 NOTE — ED NOTES
Report RN Oseas and pt transported to [facility] with all possessions on zoll monitor with ACLS certified RN

## 2017-06-21 NOTE — PROGRESS NOTES
Tele strip at *** shows *** with a HR of ***.      Measurements: ***    Tele Shift Summary:    Rhythm : ***  Rate : ***    Ectopy : Per CCT ***, pt had ***    Telemetry monitoring strips placed in pt chart.

## 2017-06-21 NOTE — PROGRESS NOTES
Bedside report given to Alexus JO. Plan of care discussed. Pt resting comfortably in bed with safety precautions in place.

## 2017-06-21 NOTE — ED NOTES
Pt medicated as directed. IV WNL. Side rail up x1 with call light in reach. Family at bedside. Lights dimmed for pt comfort. Pt placed on cont pulse ox. Pt awaiting CT.

## 2017-06-21 NOTE — THERAPY
"  Speech Language Therapy Clinical Swallow Evaluation completed.  Functional Status: Bedside swallow evaluation completed this date. Patient alert and awake with  present. Patient and RN noticed difficulty initiating swallow while drinking water last night (6/20/17). Today, patient presents with normal oral-pharyngeal swallow function. Laryngeal elevation upon palpation and swallow onset timing appears WNL. Patient and RN today report normal swallow function with snack and taking whole pills with water. Patient able to feed herself and used safe swallow strategies. Recommend continue regular/thin liquid diet. Patient educated regarding signs/symptoms of aspiration, diet recommendation, and safe swallow strategies.  Recommendations - Diet: Diet / Liquid Recommendation: Thin Liquid, Regular                          Strategies: Head of Bed at 90 Degrees                          Medication Administration: Medication Administration : Whole with Liquid Wash  Plan of Care: Will benefit from Speech Therapy 1 times per week  Post-Acute Therapy: Currently anticipate no further skilled therapy needs once patient is discharged from the inpatient setting.    See \"Rehab Therapy-Acute\" Patient Summary Report for complete documentation.   "

## 2017-06-21 NOTE — H&P
PRIMARY CARE PHYSICIAN:  Jennifer Cardoso DO    CHIEF COMPLAINT:  Headaches and double vision.    HISTORY OF PRESENT ILLNESS:  The patient is a 57-year-old female with history   of migraine and hypothyroidism who presents actually to the ER today   complaining of headaches and also double vision.  As per her, headache has   been started since a week ago and it has gotten worse and then on Sunday she   started having double vision and has begun to worse since then.  She denies   any nausea or vomiting.  No chest pain or shortness of breath.  She actually   called her ophthalmology about the double vision and he actually told her to   come to the ER for further evaluation.  Patient has a history of migraines,   but as per her she never had any more episodes since couple of years ago.  As   per her, her headache is mostly located behind her eyes, 8/10 in intensity.    REVIEW OF SYSTEMS:  All negative except as per HPI.    PAST MEDICAL HISTORY:  History of migraines and hypothyroidism.    PAST SURGICAL HISTORY:  History of hysterectomy, cholecystectomy and   appendectomy.    ALLERGIES:  ALLERGIC TO LASIX.    FAMILY HISTORY:  Father with history of diabetes.    SOCIAL HISTORY:  Denies any alcohol or drugs.  Smokes 2 cigarettes a week, is   smoking only for the last couple of months.    HOME MEDICATION:  Synthroid 125 mcg in the morning.    PHYSICAL EXAMINATION:  VITAL SIGNS:  Blood pressure 144/82, respiration rate 16, pulse 74,   temperature 98.  GENERAL:  No acute distress, nontoxic appearance.  HEENT:  Normocephalic, atraumatic.  Pupils are equal, round and reactive to   light.  NECK:  Supple, no adenopathy.  CARDIOVASCULAR:  S1, S2 normal.  No murmur or gallops appreciated.  LUNGS:  Clear to auscultation bilaterally.  No rales, rhonchi or wheezing.  ABDOMEN:  Soft, nontender, positive bowel sounds.  EXTREMITIES:  No edema, cyanosis, or clubbing.  NEUROLOGIC:  Diplopia.  Strength 5/5 on upper and lower  extremities.    Sensation intact.  SKIN:  No rash or erythema seen.    LABORATORY WORK:  WBC 7.9, hemoglobin 15.9, hematocrit 47.6.  Sodium 142,   potassium 3.8, chloride 107, bicarbonate 25, glucose 91, BUN 18, creatinine   0.8.    IMAGING:  CT head without contrast, no acute intracranial abnormality   identified.    ASSESSMENT AND PLAN:  1.  Diplopia, possible transient ischemic attack.  CT head has been negative.    We will check an MRI of the brain.  We will also do an echocardiogram and   ultrasound carotid.  We will do q. 4 hours neuro checks at this time and   continue to monitor.  2.  History of hypothyroidism.  We will continue outpatient Synthroid.  3.  Prophylactic deep venous thrombosis.  We will start patient on heparin   subcutaneously.  4.  Code status:  Patient is a full code.       ____________________________________     MD CJ SIMPSON / VICKI    DD:  06/20/2017 21:39:28  DT:  06/20/2017 22:05:22    D#:  [id number]  Job#:  [id number]

## 2017-06-21 NOTE — ED NOTES
Pt c/o headache, wants non-narcotic and is NPO. Paged Dr. Shi, over the phone order received for 15 mg Toradol IV push q 6 hours prn moderate pain. Order read back.

## 2017-06-21 NOTE — THERAPY
"Physical Therapy Evaluation completed.   Bed Mobility:  Supine to Sit: Modified Independent  Transfers: Sit to Stand: Supervised  Gait: Level Of Assist: Stand by Assist with No Equipment Needed 300 ft. Mildly unsteady especially if looking to right due to diplopia in R visual field.  Positive Rhomberg w/ posterior LOB in standing with eyes closed.      Plan of Care: Will benefit from Physical Therapy 3 times per week  Discharge Recommendations: Equipment: No Equipment Needed. Post-acute therapy Discharge to home with outpatient or home health for additional skilled therapy services pending further medical work up.    56 yo female admitted after 1 wk of headache and diploplia and blurry vision since Saturday. PMH does include hx of ptosis surgery on L eye 2/17, thyroid disorder, and hx of migraines. At baseline, pt is independent with all mobility and self cares. PT educated her in compensatory strategies and safety awareness while her acute condition remains. PT also recommends she have SBA during all mobility for safety.  PT will continue to check in on patient during hospital stay to assist with mobility, balance and DC planning. Anticipate neurology consult per RN. RN updated with results of PT eval.     See \"Rehab Therapy-Acute\" Patient Summary Report for complete documentation.     "

## 2017-06-21 NOTE — ED NOTES
"Chief Complaint   Patient presents with   • Diplopia     started Sunday   • Headache     started last week     /82 mmHg  Pulse 74  Temp(Src) 36.7 °C (98 °F)  Resp 16  Ht 1.575 m (5' 2\")  Wt 98.7 kg (217 lb 9.5 oz)  BMI 39.79 kg/m2  SpO2 96%    "

## 2017-06-21 NOTE — PROGRESS NOTES
Tele strip at 0036 shows Sinus rhythm with a HR of 61.      Measurements: .16/.08/.40    Tele Shift Summary:    Rhythm : Sinus rhythm  Rate : 60s     Ectopy : Per CCT:  pt had no ectopy.    Telemetry monitoring strips placed in pt chart, primary RN to monitor

## 2017-06-22 LAB — EKG IMPRESSION: NORMAL

## 2017-06-22 NOTE — DISCHARGE SUMMARY
DATE OF ADMISSION:  06/20/2017    DATE OF DISCHARGE:  06/21/2017    DISCHARGE DIAGNOSES:  1.  Diplopia.  2.  History of Graves' disease, currently hypothyroid.  4.  History of migraine.    IMAGING STUDIES:  1.  MR of the brain on 06/21/2017.  Findings:  No acute abnormalities.  A few   nonspecific T2 hyperintensities in the subcortical and periventricular white   matter, likely representing nonspecific foci of gliosis or minimal chronic   ischemic foci.  2.  Cardiac echo, 06/21/2017.  Findings:  LVEF of 60%.  Agitated, bubble study   negative.  No significant valvular abnormalities.  Moderate pulmonary   hypertension.  3.  Carotid echos, 06/21/2017.  Findings:  No significant flow limiting   occlusions.    LABORATORY DATA:  White count 7.2, hemoglobin 14.6, platelet count 171.  Sed   rate 17.  Sodium 143, potassium 3.6, BUN 18, creatinine 0.81.  LFTs are   unremarkable.  Total cholesterol of 203, triglycerides 146, HDL 46, .    HOSPITAL COURSE:  Briefly, this is a 57-year-old female who presented for   evaluation of diplopia.  She has been suffering from a headache for about a   week.  She does have a history of migraines.  She reported double vision;   however, which began several days prior to her presentation.  This is new for   her.    On exam, the patient did not have grossly disconjugate gaze.  Her diplopia is   made worse when she looks to the right and she has 2 images side by side.    When she looks to the left, the diplopia resolves.  The remainder of her   cranial nerve exam was intact.  Strength is 5/5.  Sensation was intact.  Her   exam was otherwise nonfocal.  Her symptoms do not worsen as the day goes on.    She has not had any eye pain.  With either eye closed, her diplopia resolves.    Workup was entertained as noted above.  I did discuss the case with Dr. Jamaal Jarquin, the patient's neurologist.  He felt that the inpatient workup   to this point was adequate and that he would recommend  she follow up with him   as an outpatient.  The patient does have a history of Graves' disease;   however, the MRI did not indicate any evidence of exophthalmos or was there   any noted on exam.    DISCHARGE MEDICATIONS:  1.  Xanax 0.5 mg at bedtime p.r.n. insomnia.  2.  Dalmane 30 mg at bedtime.  3.  Vitamin B complex supplement.  4.  Fish oil supplement.  5.  Synthroid 125 mcg p.o. daily.  6.  Macrobid 50 mg daily.  7.  Vitamin D supplement.    FOLLOWUP:  As noted with Dr. Jarquin as well as with her primary care physician.    I have also asked her to follow up with her ophthalmologist and optometrist   also in the next few weeks.  She is to come back here if her symptoms worsen   in anyway.  I examined the patient twice during the course of her discharge   day and discussed the case with her  and she at length.       ____________________________________     DO ELIANE Asher / VICKI    DD:  06/21/2017 16:10:54  DT:  06/21/2017 21:38:29    D#:  [id number]  Job#:  [id number]    cc: Stu Malik MD, Jamaal Jarquin MD, DARRELL MITCHELL DO

## 2021-02-13 ENCOUNTER — HOSPITAL ENCOUNTER (EMERGENCY)
Facility: MEDICAL CENTER | Age: 61
End: 2021-02-13
Attending: EMERGENCY MEDICINE
Payer: COMMERCIAL

## 2021-02-13 VITALS
WEIGHT: 211.64 LBS | SYSTOLIC BLOOD PRESSURE: 139 MMHG | BODY MASS INDEX: 38.95 KG/M2 | OXYGEN SATURATION: 95 % | HEIGHT: 62 IN | HEART RATE: 72 BPM | RESPIRATION RATE: 18 BRPM | DIASTOLIC BLOOD PRESSURE: 62 MMHG | TEMPERATURE: 97.9 F

## 2021-02-13 DIAGNOSIS — Z76.0 MEDICATION REFILL: ICD-10-CM

## 2021-02-13 DIAGNOSIS — F41.9 ANXIETY: ICD-10-CM

## 2021-02-13 PROCEDURE — 99282 EMERGENCY DEPT VISIT SF MDM: CPT

## 2021-02-13 RX ORDER — ALPRAZOLAM 0.5 MG/1
0.5 TABLET ORAL NIGHTLY PRN
Qty: 5 TABLET | Refills: 0 | Status: SHIPPED | OUTPATIENT
Start: 2021-02-13 | End: 2021-02-13 | Stop reason: SDUPTHER

## 2021-02-13 RX ORDER — ALPRAZOLAM 0.5 MG/1
0.5 TABLET ORAL NIGHTLY PRN
Qty: 5 TABLET | Refills: 0 | Status: SHIPPED | OUTPATIENT
Start: 2021-02-13 | End: 2021-02-18

## 2021-02-13 NOTE — ED PROVIDER NOTES
"ED Provider Note    ED Provider    Means of arrival: Private vehicle  History obtained from: Patient  History limited by: None    CHIEF COMPLAINT  Chief Complaint   Patient presents with   • Medication Refill       HPI  Ashanti Arvizu is a 60 y.o. female who presents with need for a refill of medications.  She does have a primary doctor, which made an appointment to the primary Doctor is no longer practicing so she had to make an appoint with another doctor.  She has an appointment on Wednesday with a next doctor.  The prescriptions for alprazolam.  She takes this for anxiety she takes 1 pill at night.  Her  was evaluation shows that she has been getting it consistently from 1 single primary provider.  She currently has no anxiety but is afraid that she will have some tonight.    REVIEW OF SYSTEMS  See HPI for further details.     PAST MEDICAL HISTORY   has a past medical history of Disorder of thyroid, Heart burn, and Psychiatric problem.    SOCIAL HISTORY  Social History     Tobacco Use   • Smoking status: Former Smoker   • Smokeless tobacco: Current User   Substance and Sexual Activity   • Alcohol use: Yes     Alcohol/week: 0.0 oz     Comment: Occasionally   • Drug use: No   • Sexual activity: Not on file       SURGICAL HISTORY   has a past surgical history that includes gastric banding laparoscopic (2011); magdalena by laparoscopy (2014); mammoplasty augmentation; tonsillectomy (1961); appendectomy child; hysterectomy, total abdominal; and ptosis repair (Left, 2/1/2017).    CURRENT MEDICATIONS  Home Medications    **Home medications have not yet been reviewed for this encounter**         ALLERGIES  Allergies   Allergen Reactions   • Sulfa Drugs Swelling     Throat and rash   • Lasix [Furosemide]    • Tape Swelling       PHYSICAL EXAM  VITAL SIGNS: /57   Pulse 74   Temp 36.9 °C (98.4 °F)   Resp 18   Ht 1.575 m (5' 2\")   Wt 96 kg (211 lb 10.3 oz)   SpO2 94%   BMI 38.71 kg/m²   Constitutional: Alert in no " apparent distress.  HENT: Normocephalic, Atraumatic, Mucous membranes are moist  Eyes:  Conjunctiva normal, non-icteric.   Heart: no peripheral cyanosis  Lungs: respirations are even and unlabored  Skin: Warm, Dry,normal color  Neurologic: Alert, Grossly non-focal.   Psychiatric: Affect normal, Judgment normal, Mood normal, Appears appropriate and not intoxicated.     MEDICAL DECISION MAKING  This is a 60 y.o. female who presents symptoms as described above.  She has been on it for long period time and has risk of withdrawal.  She has been very consistent in getting her prescription from a primary provider without I will give her 5 days worth of this medication so that she can see your primary doctor on Wednesday as scheduled    DIAGNOSTIC STUDIES / PROCEDURES    LABS      RADIOLOGY  No orders to display       COURSE  Pertinent Labs & Imaging studies reviewed. (See chart for details)    12:52 PM - Patient seen and examined at bedside. Discussed plan of care,  Discharged home in stable condition    FINAL IMPRESSION  1. Medication refill    2. Anxiety        The note accurately reflects work and decisions made by me.  Jemal Gill D.O.  2/13/2021  12:57 PM

## 2021-02-13 NOTE — ED TRIAGE NOTES
"Presents for a medication refill of Alprazolam.  She denies any medical complaints otherwise.   Chief Complaint   Patient presents with   • Medication Refill     /57   Pulse 74   Temp 36.9 °C (98.4 °F)   Resp 18   Ht 1.575 m (5' 2\")   Wt 96 kg (211 lb 10.3 oz)   SpO2 94%   BMI 38.71 kg/m²      "

## 2021-02-25 ENCOUNTER — OFFICE VISIT (OUTPATIENT)
Dept: MEDICAL GROUP | Facility: MEDICAL CENTER | Age: 61
End: 2021-02-25
Payer: OTHER MISCELLANEOUS

## 2021-02-25 ENCOUNTER — TELEPHONE (OUTPATIENT)
Dept: SCHEDULING | Facility: IMAGING CENTER | Age: 61
End: 2021-02-25

## 2021-02-25 VITALS
BODY MASS INDEX: 38.95 KG/M2 | RESPIRATION RATE: 15 BRPM | SYSTOLIC BLOOD PRESSURE: 132 MMHG | DIASTOLIC BLOOD PRESSURE: 88 MMHG | TEMPERATURE: 98.3 F | OXYGEN SATURATION: 95 % | WEIGHT: 211.64 LBS | HEART RATE: 73 BPM | HEIGHT: 62 IN

## 2021-02-25 DIAGNOSIS — Z11.59 NEED FOR HEPATITIS C SCREENING TEST: ICD-10-CM

## 2021-02-25 DIAGNOSIS — Z00.00 VISIT FOR PREVENTIVE HEALTH EXAMINATION: ICD-10-CM

## 2021-02-25 DIAGNOSIS — N39.0 RECURRENT UTI: ICD-10-CM

## 2021-02-25 DIAGNOSIS — E89.0 POSTABLATIVE HYPOTHYROIDISM: ICD-10-CM

## 2021-02-25 DIAGNOSIS — Z12.31 ENCOUNTER FOR SCREENING MAMMOGRAM FOR MALIGNANT NEOPLASM OF BREAST: ICD-10-CM

## 2021-02-25 DIAGNOSIS — G47.00 INSOMNIA, UNSPECIFIED TYPE: ICD-10-CM

## 2021-02-25 DIAGNOSIS — F41.1 GENERALIZED ANXIETY DISORDER: ICD-10-CM

## 2021-02-25 DIAGNOSIS — E66.9 OBESITY (BMI 35.0-39.9 WITHOUT COMORBIDITY): ICD-10-CM

## 2021-02-25 PROBLEM — H02.402 PTOSIS OF LEFT EYELID: Status: RESOLVED | Noted: 2017-02-01 | Resolved: 2021-02-25

## 2021-02-25 PROBLEM — H53.2 DIPLOPIA: Status: RESOLVED | Noted: 2017-06-20 | Resolved: 2021-02-25

## 2021-02-25 PROCEDURE — 99386 PREV VISIT NEW AGE 40-64: CPT | Performed by: FAMILY MEDICINE

## 2021-02-25 ASSESSMENT — ANXIETY QUESTIONNAIRES
5. BEING SO RESTLESS THAT IT IS HARD TO SIT STILL: SEVERAL DAYS
1. FEELING NERVOUS, ANXIOUS, OR ON EDGE: NOT AT ALL
4. TROUBLE RELAXING: SEVERAL DAYS
3. WORRYING TOO MUCH ABOUT DIFFERENT THINGS: SEVERAL DAYS
6. BECOMING EASILY ANNOYED OR IRRITABLE: SEVERAL DAYS
2. NOT BEING ABLE TO STOP OR CONTROL WORRYING: SEVERAL DAYS
7. FEELING AFRAID AS IF SOMETHING AWFUL MIGHT HAPPEN: SEVERAL DAYS
GAD7 TOTAL SCORE: 6

## 2021-02-25 ASSESSMENT — ENCOUNTER SYMPTOMS
PALPITATIONS: 0
FEVER: 0
CHILLS: 0
VOMITING: 0
DEPRESSION: 0
BLURRED VISION: 0
WEAKNESS: 0
DIARRHEA: 0
CONSTIPATION: 0
NAUSEA: 0
SHORTNESS OF BREATH: 0

## 2021-02-25 ASSESSMENT — PATIENT HEALTH QUESTIONNAIRE - PHQ9: CLINICAL INTERPRETATION OF PHQ2 SCORE: 0

## 2021-02-25 NOTE — PROGRESS NOTES
History of Present Illness  60 year old female presents to clinic to establish care. She does have a history of anxiety and insomnia, for which she takes 1-2 tablets of Xanax nightly.  She has been doing this since early 2019.  She did run out earlier this month and had to go to the emergency department to get more, as she was having withdrawal symptoms.  During that time she also had panic attacks, but does not otherwise have any.  Mood, motivation, and concentration are all stable. Previously she has tried several medications, but cannot remember them all.  She does note Paxil and Lexapro were tried and unfortunately led to significant weight gain as well as the sensation of her feeling not like herself. She denies any thoughts of self-harm, suicide, or harm to others.      She has hypothyroidism for which she is taking Synthroid 125 mcg daily. She has not had any recent changes in this dose. She denies any extreme changes in weight, palpitations, or heat/cold intolerance.     She was having lot of recurrent urinary tract infections, specifically after intercourse.  She does have Macrobid to use as needed.  In the last year her recurrent UTIs have improved, she has only needed to use the Macrobid about 2-3 times in the year.    She denies any other questions or concerns at this time.    ROS  Review of Systems   Constitutional: Negative for chills and fever.   HENT: Negative for hearing loss.    Eyes: Negative for blurred vision.   Respiratory: Negative for shortness of breath.    Cardiovascular: Negative for chest pain and palpitations.   Gastrointestinal: Negative for constipation, diarrhea, nausea and vomiting.   Genitourinary: Negative for dysuria and hematuria.   Skin: Negative for rash.   Neurological: Negative for weakness.   Psychiatric/Behavioral: Negative for depression.     Medications  Current Outpatient Medications   Medication Sig Dispense Refill   • alprazolam (XANAX) 0.5 MG Tab Take 0.5 mg by mouth  at bedtime as needed for Sleep.     • levothyroxine (SYNTHROID) 125 MCG Tab Take 125 mcg by mouth Every morning on an empty stomach.     • nitrofurantoin (MACRODANTIN) 50 MG Cap Take 50 mg by mouth as needed.     • B Complex Vitamins (VITAMIN B COMPLEX PO) Take 1 Tab by mouth every day.     • vitamin D (CHOLECALCIFEROL) 1000 UNIT Tab Take 1,000 Units by mouth every day.     • Omega-3 Fatty Acids (FISH OIL) 1000 MG Cap capsule Take 1,000 mg by mouth every day.       No current facility-administered medications for this visit.     Allergies  Allergies   Allergen Reactions   • Sulfa Drugs Swelling     Throat and rash   • Sulfamethoxazole W-Trimethoprim      Other reaction(s): LIP SWELLING   • Sulfamethoxazole-Trimethoprim Hives   • Lasix [Furosemide]    • Tape Swelling     Problem List  Patient Active Problem List   Diagnosis   • TIA (transient ischemic attack)   • Insomnia   • Postablative hypothyroidism   • Obesity (BMI 35.0-39.9 without comorbidity) (HCC)   • Recurrent UTI     Past Medical History  Past Medical History:   Diagnosis Date   • Disorder of thyroid    • Heart burn    • Psychiatric problem     anxiety     Past Surgical History  Past Surgical History:   Procedure Laterality Date   • PTOSIS REPAIR Left 2/1/2017    Procedure: PTOSIS REPAIR - UPPER (POSTERIOR APPROACH);  Surgeon: Stu Malik M.D.;  Location: SURGERY SURGICAL [facility];  Service:    • IMTIAZ BY LAPAROSCOPY  2014    removal lap band   • GASTRIC BANDING LAPAROSCOPIC  2011   • TONSILLECTOMY  1961   • ABDOMINAL HYSTERECTOMY TOTAL     • APPENDECTOMY CHILD     • HYSTERECTOMY, TOTAL ABDOMINAL     • MAMMOPLASTY AUGMENTATION       Past Family History  Family History   Problem Relation Age of Onset   • No Known Problems Mother    • Diabetes Father    • No Known Problems Sister    • Heart Disease Brother    • Heart Disease Maternal Grandmother    • Thyroid Maternal Grandmother    • Diabetes Paternal Grandmother    • Cancer Paternal Grandmother          "Prostate   • Diabetes Paternal Grandfather    • No Known Problems Son    • No Known Problems Daughter    • No Known Problems Sister    • No Known Problems Brother    • No Known Problems Brother    • No Known Problems Daughter      Social History  She reports eating a healthy and balanced diet, but does not get regular exercise. She does not currently work outside of the home. She drinks an average of 0-1 alcoholic beverages per month. She denies any tobacco product or illicit drug use. She is sexually active with one, male partner and she has had a hysterectomy for contraception.     Physical Exam  /88 (BP Location: Left arm, Patient Position: Sitting, BP Cuff Size: Adult)   Pulse 73   Temp 36.8 °C (98.3 °F) (Temporal)   Resp 15   Ht 1.575 m (5' 2\")   Wt 96 kg (211 lb 10.3 oz)   SpO2 95%   BMI 38.71 kg/m²   Physical Exam   Constitutional: She is well-developed, well-nourished, and in no distress. No distress.   HENT:   Head: Normocephalic and atraumatic.   Right Ear: Tympanic membrane, external ear and ear canal normal.   Left Ear: Tympanic membrane, external ear and ear canal normal.   Eyes: Pupils are equal, round, and reactive to light. Right eye exhibits no discharge. Left eye exhibits no discharge. No scleral icterus.   Neck: No thyromegaly present.   Cardiovascular: Normal rate, regular rhythm and normal heart sounds.   Pulmonary/Chest: Effort normal and breath sounds normal. No respiratory distress.   Abdominal: Soft. Bowel sounds are normal. She exhibits no distension. There is no abdominal tenderness.   Musculoskeletal:         General: No edema.   Neurological: She is alert.   Skin: Skin is warm and dry. She is not diaphoretic.   Psychiatric: Affect and judgment normal.     Assessment & Plan  1. Visit for preventive health examination  2. Obesity (BMI 35.0-39.9 without comorbidity)  3. Need for hepatitis C screening test  4. Encounter for screening mammogram for malignant neoplasm of breast  - " Comp Metabolic Panel; Future  - Lipid Profile; Future  - HEMOGLOBIN A1C; Future  - TSH WITH REFLEX TO FT4; Future  - HCV Scrn ( [id number] 1xLife); Future  - MA-SCREENING MAMMO BILAT W/CAD; Future    5. Insomnia, unspecified type  6. Generalized anxiety disorder  Chronic and stable.  I did discuss with the patient that I do not think Xanax nightly is the best treatment.  Have placed a referral to psychiatry, in the meantime to avoid withdrawal, when she does need a refill we will decrease from 0.5 of a milligram 2.25 of a milligram, with the intent to get her off Ativan altogether.  We can trial trazodone, BuSpar, or Atarax as needed for sleep and anxiety.  - REFERRAL TO BEHAVIORAL HEALTH   2021   PHQ 0   ANIVAL 6     7. Postablative hypothyroidism  Chronic and stable.  Continue the same dose of levothyroxine.  We will get updated TSH.  - TSH WITH REFLEX TO FT4; Future    8. Recurrent UTI  Chronic and stable.  Continue with Macrobid as needed.    Return in about 1 month (around 3/25/2021) for anxiety and insomnia management.    Jenny Juarez M.D.

## 2021-03-15 DIAGNOSIS — Z23 NEED FOR VACCINATION: ICD-10-CM

## 2021-03-16 ENCOUNTER — APPOINTMENT (OUTPATIENT)
Dept: LAB | Facility: MEDICAL CENTER | Age: 61
End: 2021-03-16
Payer: COMMERCIAL

## 2021-03-24 ENCOUNTER — IMMUNIZATION (OUTPATIENT)
Dept: FAMILY PLANNING/WOMEN'S HEALTH CLINIC | Facility: IMMUNIZATION CENTER | Age: 61
End: 2021-03-24
Attending: INTERNAL MEDICINE
Payer: OTHER MISCELLANEOUS

## 2021-03-24 DIAGNOSIS — Z23 NEED FOR VACCINATION: ICD-10-CM

## 2021-03-24 DIAGNOSIS — Z23 ENCOUNTER FOR VACCINATION: Primary | ICD-10-CM

## 2021-03-24 PROCEDURE — 91300 PFIZER SARS-COV-2 VACCINE: CPT | Performed by: INTERNAL MEDICINE

## 2021-03-24 PROCEDURE — 0001A PFIZER SARS-COV-2 VACCINE: CPT | Performed by: INTERNAL MEDICINE

## 2021-03-25 ENCOUNTER — APPOINTMENT (OUTPATIENT)
Dept: MEDICAL GROUP | Facility: MEDICAL CENTER | Age: 61
End: 2021-03-25

## 2021-03-30 ENCOUNTER — HOSPITAL ENCOUNTER (OUTPATIENT)
Dept: HOSPITAL 8 - CFH | Age: 61
Discharge: HOME | End: 2021-03-30
Attending: NURSE PRACTITIONER
Payer: COMMERCIAL

## 2021-03-30 DIAGNOSIS — K64.8: ICD-10-CM

## 2021-03-30 DIAGNOSIS — R10.30: ICD-10-CM

## 2021-03-30 DIAGNOSIS — K57.30: Primary | ICD-10-CM

## 2021-03-30 DIAGNOSIS — R14.0: ICD-10-CM

## 2021-03-30 PROCEDURE — 82565 ASSAY OF CREATININE: CPT

## 2021-03-30 PROCEDURE — 74177 CT ABD & PELVIS W/CONTRAST: CPT

## 2021-04-17 LAB
ALBUMIN SERPL-MCNC: 4.4 G/DL (ref 3.8–4.8)
ALBUMIN/GLOB SERPL: 1.6 {RATIO} (ref 1.2–2.2)
ALP SERPL-CCNC: 88 IU/L (ref 39–117)
ALT SERPL-CCNC: 26 IU/L (ref 0–32)
AST SERPL-CCNC: 19 IU/L (ref 0–40)
BILIRUB SERPL-MCNC: 0.5 MG/DL (ref 0–1.2)
BUN SERPL-MCNC: 17 MG/DL (ref 8–27)
BUN/CREAT SERPL: 21 (ref 12–28)
CALCIUM SERPL-MCNC: 10.1 MG/DL (ref 8.7–10.3)
CHLORIDE SERPL-SCNC: 103 MMOL/L (ref 96–106)
CHOLEST SERPL-MCNC: 218 MG/DL (ref 100–199)
CO2 SERPL-SCNC: 23 MMOL/L (ref 20–29)
CREAT SERPL-MCNC: 0.82 MG/DL (ref 0.57–1)
GLOBULIN SER CALC-MCNC: 2.8 G/DL (ref 1.5–4.5)
GLUCOSE SERPL-MCNC: 101 MG/DL (ref 65–99)
HBA1C MFR BLD: 5.7 % (ref 4.8–5.6)
HCV AB S/CO SERPL IA: 0.1 S/CO RATIO (ref 0–0.9)
HDLC SERPL-MCNC: 61 MG/DL
LABORATORY COMMENT REPORT: ABNORMAL
LDLC SERPL CALC-MCNC: 137 MG/DL (ref 0–99)
POTASSIUM SERPL-SCNC: 4.3 MMOL/L (ref 3.5–5.2)
PROT SERPL-MCNC: 7.2 G/DL (ref 6–8.5)
SODIUM SERPL-SCNC: 141 MMOL/L (ref 134–144)
TRIGL SERPL-MCNC: 111 MG/DL (ref 0–149)
TSH SERPL DL<=0.005 MIU/L-ACNC: 0.55 UIU/ML (ref 0.45–4.5)
VLDLC SERPL CALC-MCNC: 20 MG/DL (ref 5–40)

## 2021-04-23 ENCOUNTER — IMMUNIZATION (OUTPATIENT)
Dept: FAMILY PLANNING/WOMEN'S HEALTH CLINIC | Facility: IMMUNIZATION CENTER | Age: 61
End: 2021-04-23
Attending: INTERNAL MEDICINE
Payer: OTHER MISCELLANEOUS

## 2021-04-23 DIAGNOSIS — Z23 ENCOUNTER FOR VACCINATION: Primary | ICD-10-CM

## 2021-04-23 PROCEDURE — 91300 PFIZER SARS-COV-2 VACCINE: CPT | Performed by: NURSE PRACTITIONER

## 2021-04-23 PROCEDURE — 0002A PFIZER SARS-COV-2 VACCINE: CPT | Performed by: NURSE PRACTITIONER

## 2021-07-16 ENCOUNTER — OFFICE VISIT (OUTPATIENT)
Dept: URGENT CARE | Facility: CLINIC | Age: 61
End: 2021-07-16
Payer: OTHER MISCELLANEOUS

## 2021-07-16 VITALS
HEIGHT: 62 IN | TEMPERATURE: 98.4 F | DIASTOLIC BLOOD PRESSURE: 76 MMHG | HEART RATE: 58 BPM | OXYGEN SATURATION: 97 % | BODY MASS INDEX: 37.73 KG/M2 | RESPIRATION RATE: 16 BRPM | WEIGHT: 205 LBS | SYSTOLIC BLOOD PRESSURE: 130 MMHG

## 2021-07-16 DIAGNOSIS — L08.9: ICD-10-CM

## 2021-07-16 DIAGNOSIS — S90.812A: ICD-10-CM

## 2021-07-16 PROCEDURE — 99203 OFFICE O/P NEW LOW 30 MIN: CPT | Performed by: NURSE PRACTITIONER

## 2021-07-16 RX ORDER — CEPHALEXIN 500 MG/1
500 CAPSULE ORAL 4 TIMES DAILY
Qty: 20 CAPSULE | Refills: 0 | Status: SHIPPED | OUTPATIENT
Start: 2021-07-16 | End: 2021-07-21

## 2021-07-16 ASSESSMENT — ENCOUNTER SYMPTOMS
CHILLS: 0
BRUISES/BLEEDS EASILY: 0
FEVER: 0
VOMITING: 0
NAUSEA: 0
MYALGIAS: 1

## 2021-07-16 NOTE — PROGRESS NOTES
Subjective:     Ashanti Arvizu is a 61 y.o. female who presents for Wound Check (x 2 days, small wound on L heel, occurred during pedicure, redness, pain, swelling)      Wound Check      Pt presents for evaluation of a new problem. Ashanti is a pleasant 61-year-old female presents to urgent care today after sustaining an abrasion to her left heel while getting a pedicure.  She states that her manicurist was using a cheese grater device on her foot and accidentally cut her.  She has been using over-the-counter Neosporin and Band-Aids to keep her wound clean.  She is prediabetic and is concerned for infection.  There is pain with applying pressure and walking.  She denies fever, chills, nausea or vomiting.  Negative for drainage out of wound.    Review of Systems   Constitutional: Negative for chills and fever.   Gastrointestinal: Negative for nausea and vomiting.   Musculoskeletal: Positive for myalgias.   Endo/Heme/Allergies: Does not bruise/bleed easily.       PMH:   Past Medical History:   Diagnosis Date   • Disorder of thyroid    • Heart burn    • Psychiatric problem     anxiety     ALLERGIES:   Allergies   Allergen Reactions   • Sulfa Drugs Swelling     Throat and rash   • Sulfamethoxazole W-Trimethoprim      Other reaction(s): LIP SWELLING   • Sulfamethoxazole-Trimethoprim Hives   • Lasix [Furosemide]    • Tape Swelling     SURGHX:   Past Surgical History:   Procedure Laterality Date   • PTOSIS REPAIR Left 2/1/2017    Procedure: PTOSIS REPAIR - UPPER (POSTERIOR APPROACH);  Surgeon: Stu Malik M.D.;  Location: SURGERY SURGICAL [facility];  Service:    • IMTIAZ BY LAPAROSCOPY  2014    removal lap band   • GASTRIC BANDING LAPAROSCOPIC  2011   • TONSILLECTOMY  1961   • ABDOMINAL HYSTERECTOMY TOTAL     • APPENDECTOMY CHILD     • HYSTERECTOMY, TOTAL ABDOMINAL     • MAMMOPLASTY AUGMENTATION       SOCHX:   Social History     Socioeconomic History   • Marital status:      Spouse name: Not on file   • Number of  children: Not on file   • Years of education: Not on file   • Highest education level: Not on file   Occupational History   • Not on file   Tobacco Use   • Smoking status: Former Smoker   • Smokeless tobacco: Current User   Vaping Use   • Vaping Use: Never used   Substance and Sexual Activity   • Alcohol use: Yes     Alcohol/week: 0.0 oz     Comment: Occasionally   • Drug use: No   • Sexual activity: Not on file   Other Topics Concern   • Not on file   Social History Narrative   • Not on file     Social Determinants of Health     Financial Resource Strain:    • Difficulty of Paying Living Expenses:    Food Insecurity:    • Worried About Running Out of Food in the Last Year:    • Ran Out of Food in the Last Year:    Transportation Needs:    • Lack of Transportation (Medical):    • Lack of Transportation (Non-Medical):    Physical Activity:    • Days of Exercise per Week:    • Minutes of Exercise per Session:    Stress:    • Feeling of Stress :    Social Connections:    • Frequency of Communication with Friends and Family:    • Frequency of Social Gatherings with Friends and Family:    • Attends Oriental orthodox Services:    • Active Member of Clubs or Organizations:    • Attends Club or Organization Meetings:    • Marital Status:    Intimate Partner Violence:    • Fear of Current or Ex-Partner:    • Emotionally Abused:    • Physically Abused:    • Sexually Abused:      FH:   Family History   Problem Relation Age of Onset   • No Known Problems Mother    • Diabetes Father    • No Known Problems Sister    • Heart Disease Brother    • Heart Disease Maternal Grandmother    • Thyroid Maternal Grandmother    • Diabetes Paternal Grandmother    • Cancer Paternal Grandmother         Prostate   • Diabetes Paternal Grandfather    • No Known Problems Son    • No Known Problems Daughter    • No Known Problems Sister    • No Known Problems Brother    • No Known Problems Brother    • No Known Problems Daughter          Objective:   BP  "130/76 (BP Location: Right arm, Patient Position: Sitting, BP Cuff Size: Adult)   Pulse (!) 58   Temp 36.9 °C (98.4 °F) (Temporal)   Resp 16   Ht 1.575 m (5' 2\")   Wt 93 kg (205 lb)   SpO2 97%   BMI 37.49 kg/m²     Physical Exam  Vitals and nursing note reviewed.   Constitutional:       General: She is not in acute distress.     Appearance: Normal appearance. She is not ill-appearing.   HENT:      Head: Normocephalic and atraumatic.      Right Ear: External ear normal.      Left Ear: External ear normal.      Nose: No congestion or rhinorrhea.      Mouth/Throat:      Mouth: Mucous membranes are moist.   Eyes:      Extraocular Movements: Extraocular movements intact.      Pupils: Pupils are equal, round, and reactive to light.   Cardiovascular:      Rate and Rhythm: Normal rate and regular rhythm.      Pulses: Normal pulses.      Heart sounds: Normal heart sounds.   Pulmonary:      Effort: Pulmonary effort is normal.      Breath sounds: Normal breath sounds.   Abdominal:      General: Abdomen is flat. Bowel sounds are normal.      Palpations: Abdomen is soft.   Musculoskeletal:         General: Normal range of motion.      Cervical back: Normal range of motion and neck supple.   Skin:     General: Skin is warm and dry.      Capillary Refill: Capillary refill takes less than 2 seconds.      Comments: 0.5 cm superficial abrasion to left posterior heel.  Negative for active drainage or bleeding.  There is area of erythema surrounding abrasion.  Positive for pain with palpation.   Neurological:      General: No focal deficit present.      Mental Status: She is alert and oriented to person, place, and time. Mental status is at baseline.   Psychiatric:         Mood and Affect: Mood normal.         Behavior: Behavior normal.         Thought Content: Thought content normal.         Judgment: Judgment normal.         Assessment/Plan:   Assessment      1. Infected abrasion of heel, left, initial encounter  cephALEXin " (KEFLEX) 500 MG Cap    mupirocin (BACTROBAN) 2 % Ointment   Due to her prediabetic state she will be placed on empiric antibiotics for abrasion of heel.  She was encouraged to keep wound clean and dry.  Worsening signs and symptoms of infection discussed.  AVS handout given and reviewed with patient. Pt educated on red flags and when to seek treatment back in ER or UC.

## 2021-09-23 ENCOUNTER — OFFICE VISIT (OUTPATIENT)
Dept: URGENT CARE | Facility: CLINIC | Age: 61
End: 2021-09-23
Payer: OTHER MISCELLANEOUS

## 2021-09-23 VITALS
DIASTOLIC BLOOD PRESSURE: 98 MMHG | BODY MASS INDEX: 37.73 KG/M2 | HEIGHT: 62 IN | SYSTOLIC BLOOD PRESSURE: 132 MMHG | RESPIRATION RATE: 16 BRPM | TEMPERATURE: 98.3 F | HEART RATE: 60 BPM | WEIGHT: 205 LBS | OXYGEN SATURATION: 95 %

## 2021-09-23 DIAGNOSIS — J06.9 VIRAL URI: ICD-10-CM

## 2021-09-23 DIAGNOSIS — J02.9 SORE THROAT: ICD-10-CM

## 2021-09-23 DIAGNOSIS — R68.89 FLU-LIKE SYMPTOMS: ICD-10-CM

## 2021-09-23 LAB
FLUAV+FLUBV AG SPEC QL IA: NORMAL
INT CON NEG: NORMAL
INT CON NEG: NORMAL
INT CON POS: NORMAL
INT CON POS: NORMAL
S PYO AG THROAT QL: NORMAL

## 2021-09-23 PROCEDURE — 87880 STREP A ASSAY W/OPTIC: CPT | Performed by: NURSE PRACTITIONER

## 2021-09-23 PROCEDURE — 87804 INFLUENZA ASSAY W/OPTIC: CPT | Performed by: NURSE PRACTITIONER

## 2021-09-23 PROCEDURE — 99213 OFFICE O/P EST LOW 20 MIN: CPT | Performed by: NURSE PRACTITIONER

## 2021-09-23 RX ORDER — DICYCLOMINE HCL 20 MG
20 TABLET ORAL EVERY 6 HOURS
COMMUNITY
End: 2022-08-20

## 2021-09-23 ASSESSMENT — ENCOUNTER SYMPTOMS
MYALGIAS: 1
FEVER: 0
VOMITING: 1
NAUSEA: 1
FATIGUE: 1
COUGH: 0
SORE THROAT: 1
CHILLS: 0

## 2021-09-23 NOTE — PROGRESS NOTES
Subjective     Ashanti Arvizu is a 61 y.o. female who presents with Fatigue (x1 wk), Body Aches (x1 wk), and Sore Throat (expose to strep)            Fatigue  This is a new problem. Episode onset: pt reports new onset of generalized aches, N/V that started 6 days ago. a few days later she developed some throat discomfort. She was made aware that her grandkids she was recently with had tested positive for strep and she was with them during that time. Associated symptoms include fatigue, myalgias, nausea, a sore throat and vomiting. Pertinent negatives include no chills, congestion, coughing or fever. Associated symptoms comments: She was tested for COVID yesterday and it was negative. She is fully vaccinated for COVID. Recent sick contacts are her grandkids. She has tried acetaminophen for the symptoms. The treatment provided mild relief.       Review of Systems   Constitutional: Positive for fatigue. Negative for chills and fever.   HENT: Positive for sore throat. Negative for congestion.    Respiratory: Negative for cough.    Gastrointestinal: Positive for nausea and vomiting.   Musculoskeletal: Positive for myalgias.   All other systems reviewed and are negative.         Past Medical History:   Diagnosis Date   • Disorder of thyroid    • Heart burn    • Psychiatric problem     anxiety      Past Surgical History:   Procedure Laterality Date   • PTOSIS REPAIR Left 2/1/2017    Procedure: PTOSIS REPAIR - UPPER (POSTERIOR APPROACH);  Surgeon: Stu Malik M.D.;  Location: SURGERY SURG[facility];  Service:    • IMTIAZ BY LAPAROSCOPY  2014    removal lap band   • GASTRIC BANDING LAPAROSCOPIC  2011   • TONSILLECTOMY  1961   • ABDOMINAL HYSTERECTOMY TOTAL     • APPENDECTOMY CHILD     • HYSTERECTOMY, TOTAL ABDOMINAL     • MAMMOPLASTY AUGMENTATION        Social History     Socioeconomic History   • Marital status:      Spouse name: Not on file   • Number of children: Not on file   • Years of education: Not on file   •  "Highest education level: Not on file   Occupational History   • Not on file   Tobacco Use   • Smoking status: Never Smoker   • Smokeless tobacco: Never Used   Vaping Use   • Vaping Use: Never used   Substance and Sexual Activity   • Alcohol use: Yes     Alcohol/week: 0.0 oz     Comment: Occasionally   • Drug use: Yes     Types: Marijuana, Oral   • Sexual activity: Not on file   Other Topics Concern   • Not on file   Social History Narrative   • Not on file     Social Determinants of Health     Financial Resource Strain:    • Difficulty of Paying Living Expenses:    Food Insecurity:    • Worried About Running Out of Food in the Last Year:    • Ran Out of Food in the Last Year:    Transportation Needs:    • Lack of Transportation (Medical):    • Lack of Transportation (Non-Medical):    Physical Activity:    • Days of Exercise per Week:    • Minutes of Exercise per Session:    Stress:    • Feeling of Stress :    Social Connections:    • Frequency of Communication with Friends and Family:    • Frequency of Social Gatherings with Friends and Family:    • Attends Gnosticism Services:    • Active Member of Clubs or Organizations:    • Attends Club or Organization Meetings:    • Marital Status:    Intimate Partner Violence:    • Fear of Current or Ex-Partner:    • Emotionally Abused:    • Physically Abused:    • Sexually Abused:          Objective     /98 (BP Location: Right arm, Patient Position: Sitting, BP Cuff Size: Adult)   Pulse 60   Temp 36.8 °C (98.3 °F) (Temporal)   Resp 16   Ht 1.575 m (5' 2\") Comment: pt. stated  Wt 93 kg (205 lb) Comment: pt.stated  SpO2 95%   BMI 37.49 kg/m²      Physical Exam  Vitals and nursing note reviewed.   Constitutional:       Appearance: Normal appearance. She is normal weight.   HENT:      Head: Normocephalic and atraumatic.      Nose: Nose normal.      Mouth/Throat:      Mouth: Mucous membranes are moist.      Pharynx: Oropharynx is clear. Posterior oropharyngeal erythema " present.   Eyes:      Extraocular Movements: Extraocular movements intact.      Pupils: Pupils are equal, round, and reactive to light.   Cardiovascular:      Rate and Rhythm: Normal rate and regular rhythm.   Pulmonary:      Effort: Pulmonary effort is normal.      Breath sounds: Normal breath sounds.   Musculoskeletal:         General: Normal range of motion.      Cervical back: Normal range of motion.   Skin:     General: Skin is warm and dry.      Capillary Refill: Capillary refill takes less than 2 seconds.   Neurological:      General: No focal deficit present.      Mental Status: She is alert and oriented to person, place, and time. Mental status is at baseline.   Psychiatric:         Mood and Affect: Mood normal.         Speech: Speech normal.         Thought Content: Thought content normal.         Judgment: Judgment normal.                             Assessment & Plan        1. Flu-like symptoms  - POCT Influenza A/B NEGATIVE    2. Sore throat  - POCT Rapid Strep A NEGATIVE    3. Viral URI    Discussed with patient symptoms are viral in nature, there is low concern for any respiratory infection currently. Antibiotics are not advised at this time.  Encouraged rest, fluids and supportive care  May continue OTC meds as needed for symptom relief  Supportive care, differential diagnoses, and indications for immediate follow-up discussed with patient.    Pathogenesis of diagnosis discussed including typical length and natural progression.      Instructed to return to  or nearest emergency department if symptoms fail to improve, for any change in condition, further concerns, or new concerning symptoms.  Patient states understanding of the plan of care and discharge instructions.

## 2021-09-24 ENCOUNTER — APPOINTMENT (OUTPATIENT)
Dept: RADIOLOGY | Facility: MEDICAL CENTER | Age: 61
End: 2021-09-24
Attending: EMERGENCY MEDICINE
Payer: COMMERCIAL

## 2021-09-24 ENCOUNTER — HOSPITAL ENCOUNTER (EMERGENCY)
Facility: MEDICAL CENTER | Age: 61
End: 2021-09-24
Attending: EMERGENCY MEDICINE
Payer: COMMERCIAL

## 2021-09-24 VITALS
HEART RATE: 57 BPM | OXYGEN SATURATION: 98 % | SYSTOLIC BLOOD PRESSURE: 175 MMHG | TEMPERATURE: 97.4 F | WEIGHT: 205.03 LBS | DIASTOLIC BLOOD PRESSURE: 80 MMHG | BODY MASS INDEX: 37.73 KG/M2 | HEIGHT: 62 IN | RESPIRATION RATE: 18 BRPM

## 2021-09-24 DIAGNOSIS — F43.9 STRESS: ICD-10-CM

## 2021-09-24 DIAGNOSIS — R03.0 ELEVATED BLOOD PRESSURE READING: ICD-10-CM

## 2021-09-24 DIAGNOSIS — F41.8 SITUATIONAL ANXIETY: ICD-10-CM

## 2021-09-24 LAB
ANION GAP SERPL CALC-SCNC: 13 MMOL/L (ref 7–16)
BASOPHILS # BLD AUTO: 0.8 % (ref 0–1.8)
BASOPHILS # BLD: 0.05 K/UL (ref 0–0.12)
BUN SERPL-MCNC: 11 MG/DL (ref 8–22)
CALCIUM SERPL-MCNC: 9.9 MG/DL (ref 8.4–10.2)
CHLORIDE SERPL-SCNC: 102 MMOL/L (ref 96–112)
CO2 SERPL-SCNC: 27 MMOL/L (ref 20–33)
CREAT SERPL-MCNC: 0.67 MG/DL (ref 0.5–1.4)
EKG IMPRESSION: NORMAL
EOSINOPHIL # BLD AUTO: 0.3 K/UL (ref 0–0.51)
EOSINOPHIL NFR BLD: 4.7 % (ref 0–6.9)
ERYTHROCYTE [DISTWIDTH] IN BLOOD BY AUTOMATED COUNT: 40.7 FL (ref 35.9–50)
GLUCOSE SERPL-MCNC: 90 MG/DL (ref 65–99)
HCT VFR BLD AUTO: 49.8 % (ref 37–47)
HGB BLD-MCNC: 16.6 G/DL (ref 12–16)
IMM GRANULOCYTES # BLD AUTO: 0.02 K/UL (ref 0–0.11)
IMM GRANULOCYTES NFR BLD AUTO: 0.3 % (ref 0–0.9)
LYMPHOCYTES # BLD AUTO: 2.44 K/UL (ref 1–4.8)
LYMPHOCYTES NFR BLD: 38.3 % (ref 22–41)
MCH RBC QN AUTO: 28.5 PG (ref 27–33)
MCHC RBC AUTO-ENTMCNC: 33.3 G/DL (ref 33.6–35)
MCV RBC AUTO: 85.4 FL (ref 81.4–97.8)
MONOCYTES # BLD AUTO: 0.4 K/UL (ref 0–0.85)
MONOCYTES NFR BLD AUTO: 6.3 % (ref 0–13.4)
NEUTROPHILS # BLD AUTO: 3.16 K/UL (ref 2–7.15)
NEUTROPHILS NFR BLD: 49.6 % (ref 44–72)
NRBC # BLD AUTO: 0 K/UL
NRBC BLD-RTO: 0 /100 WBC
PLATELET # BLD AUTO: 213 K/UL (ref 164–446)
PMV BLD AUTO: 10.2 FL (ref 9–12.9)
POTASSIUM SERPL-SCNC: 3.8 MMOL/L (ref 3.6–5.5)
RBC # BLD AUTO: 5.83 M/UL (ref 4.2–5.4)
SODIUM SERPL-SCNC: 142 MMOL/L (ref 135–145)
TROPONIN T SERPL-MCNC: 7 NG/L (ref 6–19)
WBC # BLD AUTO: 6.4 K/UL (ref 4.8–10.8)

## 2021-09-24 PROCEDURE — 84484 ASSAY OF TROPONIN QUANT: CPT

## 2021-09-24 PROCEDURE — 96374 THER/PROPH/DIAG INJ IV PUSH: CPT

## 2021-09-24 PROCEDURE — 36415 COLL VENOUS BLD VENIPUNCTURE: CPT

## 2021-09-24 PROCEDURE — 85025 COMPLETE CBC W/AUTO DIFF WBC: CPT

## 2021-09-24 PROCEDURE — 700111 HCHG RX REV CODE 636 W/ 250 OVERRIDE (IP): Performed by: EMERGENCY MEDICINE

## 2021-09-24 PROCEDURE — 93005 ELECTROCARDIOGRAM TRACING: CPT

## 2021-09-24 PROCEDURE — 99284 EMERGENCY DEPT VISIT MOD MDM: CPT

## 2021-09-24 PROCEDURE — 80048 BASIC METABOLIC PNL TOTAL CA: CPT

## 2021-09-24 PROCEDURE — 71045 X-RAY EXAM CHEST 1 VIEW: CPT

## 2021-09-24 PROCEDURE — 93005 ELECTROCARDIOGRAM TRACING: CPT | Performed by: EMERGENCY MEDICINE

## 2021-09-24 RX ORDER — LORAZEPAM 2 MG/ML
1 INJECTION INTRAMUSCULAR ONCE
Status: COMPLETED | OUTPATIENT
Start: 2021-09-24 | End: 2021-09-24

## 2021-09-24 RX ORDER — HYDROXYZINE HYDROCHLORIDE 25 MG/1
25 TABLET, FILM COATED ORAL 3 TIMES DAILY PRN
Qty: 30 TABLET | Refills: 0 | Status: SHIPPED | OUTPATIENT
Start: 2021-09-24 | End: 2023-06-09

## 2021-09-24 RX ADMIN — LORAZEPAM 1 MG: 2 INJECTION INTRAMUSCULAR; INTRAVENOUS at 13:24

## 2021-09-24 NOTE — ED TRIAGE NOTES
"Chief Complaint   Patient presents with   • Hypertension     \"i dont feel good\", c/o sweating, dizziness. /100, PCP told to come to ER      BP (!) 178/98   Pulse 64   Temp 36.3 °C (97.4 °F) (Temporal)   Resp 18   Ht 1.575 m (5' 2\")   Wt 93 kg (205 lb 0.4 oz)   SpO2 97%   BMI 37.50 kg/m²     Pt arrived w/ above concern, pt does not take BP meds but states \" I have a lot of stress in my life, my son is bipolar\"    Has this patient been vaccinated for COVID - YES  If not, would they like to be vaccinated while in the ER if eligible?  n/a  Would the patient like to speak with the ERP about the possibility of receiving the COVID vaccine today before making a decision? n/a    " Are you able to refill this medication for patient or would you like patient to come in for an office visit?

## 2021-09-24 NOTE — ED NOTES
Discharge instructions given and discussed. RX for atarax sent to preferred pharmacy.  given and pt educated. Pt educated to come back to ER for new or worsening symptoms and follow up with PCP as instructed and monitor BP at home. Pt has BP cuff at home.  Pt verbalized understanding. VSS. Pt  Discharged in stable condition.

## 2021-09-24 NOTE — ED PROVIDER NOTES
"ED Provider Note    Scribed for Carlos Fields M.D. by Hans Hendrix. 9/24/2021  11:29 AM    Primary care provider: Jenny Juarez M.D.  Means of arrival: Walk-in  History obtained from: Patient  History limited by: None    CHIEF COMPLAINT  Chief Complaint   Patient presents with   • Hypertension     \"i dont feel good\", c/o sweating, dizziness. /100, PCP told to come to ER        HPI  Ashanti Arvizu is a 61 y.o. female who presents to the Emergency Department for hypertension. Patient states she has been experiencing a significant amount of stress lately due to her son who has bipolar disorder. She has also been sweating significantly, been feeling dizzy, and overall has felt unwell. Patient measured her BP at home and found it to be 194/100. She called her PCP who recommended she come to the ED. She does not take any hypertensive medications. Patient otherwise denies any chest pain, vomiting, fevers, or cough.     REVIEW OF SYSTEMS  Pertinent positives include hypertension, stress, anxiety, diaphoresis, and dizziess.   Pertinent negatives include no chest pain, vomiting, fevers, or cough.     All other systems reviewed and negative. See HPI for further details.       PAST MEDICAL HISTORY   has a past medical history of Disorder of thyroid, Heart burn, and Psychiatric problem.    SURGICAL HISTORY   has a past surgical history that includes gastric banding laparoscopic (2011); magdalena by laparoscopy (2014); mammoplasty augmentation; tonsillectomy (1961); appendectomy child; hysterectomy, total abdominal; ptosis repair (Left, 2/1/2017); and abdominal hysterectomy total.    SOCIAL HISTORY  Social History     Tobacco Use   • Smoking status: Never Smoker   • Smokeless tobacco: Never Used   Vaping Use   • Vaping Use: Never used   Substance Use Topics   • Alcohol use: Yes     Alcohol/week: 0.0 oz     Comment: Occasionally   • Drug use: Yes     Types: Marijuana, Oral      Social History     Substance and Sexual " "Activity   Drug Use Yes   • Types: Marijuana, Oral       FAMILY HISTORY  Family History   Problem Relation Age of Onset   • No Known Problems Mother    • Diabetes Father    • No Known Problems Sister    • Heart Disease Brother    • Heart Disease Maternal Grandmother    • Thyroid Maternal Grandmother    • Diabetes Paternal Grandmother    • Cancer Paternal Grandmother         Prostate   • Diabetes Paternal Grandfather    • No Known Problems Son    • No Known Problems Daughter    • No Known Problems Sister    • No Known Problems Brother    • No Known Problems Brother    • No Known Problems Daughter        CURRENT MEDICATIONS  Home Medications     Reviewed by Makayla Ford (Pharmacy Tech) on 09/24/21 at 1207  Med List Status: Complete   Medication Last Dose Status   alprazolam (XANAX) 0.5 MG Tab 9/23/2021 Active   B Complex Vitamins (VITAMIN B COMPLEX PO) 9/23/2021 Active   dicyclomine (BENTYL) 20 MG Tab 9/23/2021 Active   levothyroxine (SYNTHROID) 125 MCG Tab 9/23/2021 Active   Omega-3 Fatty Acids (FISH OIL) 1000 MG Cap capsule 9/23/2021 Active   vitamin D (CHOLECALCIFEROL) 1000 UNIT Tab 9/23/2021 Active                ALLERGIES  Allergies   Allergen Reactions   • Sulfa Drugs Swelling     Throat and rash   • Sulfamethoxazole W-Trimethoprim      Other reaction(s): LIP SWELLING   • Sulfamethoxazole-Trimethoprim Hives   • Lasix [Furosemide]    • Tape Swelling       PHYSICAL EXAM  VITAL SIGNS: BP (!) 178/98   Pulse 64   Temp 36.3 °C (97.4 °F) (Temporal)   Resp 18   Ht 1.575 m (5' 2\")   Wt 93 kg (205 lb 0.4 oz)   SpO2 97%   BMI 37.50 kg/m²   Nursing note and vitals reviewed.  Constitutional: Well-developed and well-nourished. Anxious and stressed.   HENT: Head is normocephalic and atraumatic. Oropharynx is clear and moist without exudate or erythema.   Eyes: Pupils are equal, round, and reactive to light. Conjunctiva are normal.   Cardiovascular: Normal rate and regular rhythm. No murmur heard. Normal radial " pulses.   Pulmonary/Chest: Breath sounds normal. No wheezes or rales. No chest wall tenderness.   Abdominal: Soft and non-tender. No distention   Musculoskeletal: Extremities exhibit normal range of motion without edema or tenderness. No calf tenderness or palpable cords.   Neurological: Awake, alert and oriented to person, place, and time. No focal deficits noted.  Skin: Skin is warm and dry. No rash.   Psychiatric: Appears anxious and stressed, otherwise appropriate for clinical situation    DIAGNOSTIC STUDIES / PROCEDURES    EKG Interpretation  Interpreted by me as below    LABS  Results for orders placed or performed during the hospital encounter of 21   CBC WITH DIFFERENTIAL   Result Value Ref Range    WBC 6.4 4.8 - 10.8 K/uL    RBC 5.83 (H) 4.20 - 5.40 M/uL    Hemoglobin 16.6 (H) 12.0 - 16.0 g/dL    Hematocrit 49.8 (H) 37.0 - 47.0 %    MCV 85.4 81.4 - 97.8 fL    MCH 28.5 27.0 - 33.0 pg    MCHC 33.3 (L) 33.6 - 35.0 g/dL    RDW 40.7 35.9 - 50.0 fL    Platelet Count 213 164 - 446 K/uL    MPV 10.2 9.0 - 12.9 fL    Neutrophils-Polys 49.60 44.00 - 72.00 %    Lymphocytes 38.30 22.00 - 41.00 %    Monocytes 6.30 0.00 - 13.40 %    Eosinophils 4.70 0.00 - 6.90 %    Basophils 0.80 0.00 - 1.80 %    Immature Granulocytes 0.30 0.00 - 0.90 %    Nucleated RBC 0.00 /100 WBC    Neutrophils (Absolute) 3.16 2.00 - 7.15 K/uL    Lymphs (Absolute) 2.44 1.00 - 4.80 K/uL    Monos (Absolute) 0.40 0.00 - 0.85 K/uL    Eos (Absolute) 0.30 0.00 - 0.51 K/uL    Baso (Absolute) 0.05 0.00 - 0.12 K/uL    Immature Granulocytes (abs) 0.02 0.00 - 0.11 K/uL    NRBC (Absolute) 0.00 K/uL   EKG   Result Value Ref Range    Report       [facility] Emergency Dept.    Test Date:  2021  Pt Name:    VICKI FLEMNIG                   Department: [facility]  MRN:        [id number]                      Room:  Gender:     Female                       Technician: WILLIS  :        1960                   Requested By:ER TRIAGE  PROTOCOL  Order #:    [id number]                    Reading MD: KAYLA MESSER MD    Measurements  Intervals                                Axis  Rate:       61                           P:          58  UT:         152                          QRS:        30  QRSD:       94                           T:          47  QT:         413  QTc:        416    Interpretive Statements  Sinus rhythm  Compared to ECG 06/20/2017 19:55:38  No significant changes  Electronically Signed On 9- 11:30:40 PDT by KAYLA MESSER MD       All labs reviewed by me.    RADIOLOGY  DX-CHEST-PORTABLE (1 VIEW)   Final Result         1. No acute cardiopulmonary abnormalities are identified.        The radiologist's interpretation of all radiological studies have been reviewed by me.    COURSE & MEDICAL DECISION MAKING  Nursing notes, VS, PMSFHx reviewed in chart.     11:29 AM - Patient seen and examined at bedside. Patient will be treated with Ativan 1 mg.  Ordered DX-chest, CBC w/ diff, Troponin, BMP, and EKG to evaluate her symptoms. The differential diagnoses include but are not limited to: Situational anxiety, stress, hypertension    1:17 PM  On repeat evaluation, patient is feeling better.  Blood pressures improved without treatment.  She has significant social stressors.  I feel that it would be most appropriate to have her address the social stressors before initiating treatment for hypertension.  I have her follow-up with her primary care provider.  Patient will be given a prescription for hydroxyzine as needed for anxiety.     Laboratory studies are reassuring.  Troponin is not elevated.  Normal renal function.    The patient will return for new or worsening symptoms and is stable at the time of discharge.    The patient is referred to a primary physician for blood pressure management, diabetic screening, and for all other preventative health concerns.    DISPOSITION:  Patient will be discharged home in stable  condition.    FOLLOW UP:  Jennifer Cardoso D.O.  [address] [facility]  [address]  [place] [id number] [phone]    Schedule an appointment as soon as possible for a visit       [facility], Emergency Dept  [address]  [place] [id number] [phone]    If symptoms worsen      OUTPATIENT MEDICATIONS:  New Prescriptions    HYDROXYZINE HCL (ATARAX) 25 MG TAB    Take 1 Tablet by mouth 3 times a day as needed for Anxiety.       FINAL IMPRESSION  1. Elevated blood pressure reading    2. Stress    3. Situational anxiety          Hans CALIXTO (Scribe), am scribing for, and in the presence of, Carlos Fields M.D..    Electronically signed by: Hans Hendrix (Scribe), 9/24/2021    Carlos CALIXTO M.D. personally performed the services described in this documentation, as scribed by Hans Hendrix in my presence, and it is both accurate and complete.    The note accurately reflects work and decisions made by me.  Carlos Fields M.D.  9/24/2021  1:17 PM

## 2022-01-13 ENCOUNTER — APPOINTMENT (OUTPATIENT)
Dept: RADIOLOGY | Facility: IMAGING CENTER | Age: 62
End: 2022-01-13
Attending: INTERNAL MEDICINE
Payer: OTHER MISCELLANEOUS

## 2022-01-13 ENCOUNTER — OFFICE VISIT (OUTPATIENT)
Dept: URGENT CARE | Facility: CLINIC | Age: 62
End: 2022-01-13
Payer: OTHER MISCELLANEOUS

## 2022-01-13 ENCOUNTER — HOSPITAL ENCOUNTER (EMERGENCY)
Facility: MEDICAL CENTER | Age: 62
End: 2022-01-13
Attending: EMERGENCY MEDICINE
Payer: COMMERCIAL

## 2022-01-13 VITALS
BODY MASS INDEX: 37 KG/M2 | TEMPERATURE: 97.9 F | OXYGEN SATURATION: 99 % | HEIGHT: 62 IN | RESPIRATION RATE: 18 BRPM | WEIGHT: 201.06 LBS | SYSTOLIC BLOOD PRESSURE: 137 MMHG | DIASTOLIC BLOOD PRESSURE: 81 MMHG | HEART RATE: 69 BPM

## 2022-01-13 VITALS
HEART RATE: 64 BPM | BODY MASS INDEX: 36.44 KG/M2 | HEIGHT: 62 IN | RESPIRATION RATE: 14 BRPM | OXYGEN SATURATION: 95 % | WEIGHT: 198 LBS | DIASTOLIC BLOOD PRESSURE: 60 MMHG | SYSTOLIC BLOOD PRESSURE: 116 MMHG | TEMPERATURE: 97.5 F

## 2022-01-13 DIAGNOSIS — R05.9 COUGH: ICD-10-CM

## 2022-01-13 DIAGNOSIS — U07.1 COVID-19: ICD-10-CM

## 2022-01-13 DIAGNOSIS — U07.1 COVID-19 VIRUS INFECTION: ICD-10-CM

## 2022-01-13 PROCEDURE — 99213 OFFICE O/P EST LOW 20 MIN: CPT | Performed by: INTERNAL MEDICINE

## 2022-01-13 PROCEDURE — 99282 EMERGENCY DEPT VISIT SF MDM: CPT

## 2022-01-13 PROCEDURE — 71046 X-RAY EXAM CHEST 2 VIEWS: CPT | Mod: TC,FY,CS | Performed by: INTERNAL MEDICINE

## 2022-01-13 ASSESSMENT — ENCOUNTER SYMPTOMS
FEVER: 1
CHILLS: 1
SHORTNESS OF BREATH: 1
MYALGIAS: 1
WHEEZING: 0
PALPITATIONS: 0
VOMITING: 0
COUGH: 1
NAUSEA: 0

## 2022-01-13 ASSESSMENT — FIBROSIS 4 INDEX
FIB4 SCORE: 1.08
FIB4 SCORE: 1.08

## 2022-01-13 NOTE — ED PROVIDER NOTES
ED Provider Note    CHIEF COMPLAINT  Chief Complaint   Patient presents with   • Cough     cough x one week and sent here from urgent care for pneumonia       HPI  Ashanti Arvizu is a 61 y.o. female who presents with a cough.  The patient's been sick for approximately a week.  She was seen at the urgent care and diagnosed with COVID-19 pneumonia.  She was sent here for possible admission due to core morbidities.  The patient has not any associated vomiting or diarrhea.  She does have a lot of congestion.    REVIEW OF SYSTEMS  See HPI for further details. All other systems are negative.     PAST MEDICAL HISTORY  Past Medical History:   Diagnosis Date   • Disorder of thyroid    • Heart burn    • Psychiatric problem     anxiety       FAMILY HISTORY  [unfilled]    SOCIAL HISTORY  Social History     Socioeconomic History   • Marital status:      Spouse name: Not on file   • Number of children: Not on file   • Years of education: Not on file   • Highest education level: Not on file   Occupational History   • Not on file   Tobacco Use   • Smoking status: Never Smoker   • Smokeless tobacco: Never Used   Vaping Use   • Vaping Use: Never used   Substance and Sexual Activity   • Alcohol use: Not Currently     Alcohol/week: 0.0 oz     Comment: Occasionally   • Drug use: Not Currently     Types: Marijuana, Oral     Comment: cbd occ   • Sexual activity: Not on file   Other Topics Concern   • Not on file   Social History Narrative   • Not on file     Social Determinants of Health     Financial Resource Strain:    • Difficulty of Paying Living Expenses: Not on file   Food Insecurity:    • Worried About Running Out of Food in the Last Year: Not on file   • Ran Out of Food in the Last Year: Not on file   Transportation Needs:    • Lack of Transportation (Medical): Not on file   • Lack of Transportation (Non-Medical): Not on file   Physical Activity:    • Days of Exercise per Week: Not on file   • Minutes of Exercise per Session: Not  "on file   Stress:    • Feeling of Stress : Not on file   Social Connections:    • Frequency of Communication with Friends and Family: Not on file   • Frequency of Social Gatherings with Friends and Family: Not on file   • Attends Pent[employer]al Services: Not on file   • Active Member of Clubs or Organizations: Not on file   • Attends Club or Organization Meetings: Not on file   • Marital Status: Not on file   Intimate Partner Violence:    • Fear of Current or Ex-Partner: Not on file   • Emotionally Abused: Not on file   • Physically Abused: Not on file   • Sexually Abused: Not on file   Housing Stability:    • Unable to Pay for Housing in the Last Year: Not on file   • Number of Places Lived in the Last Year: Not on file   • Unstable Housing in the Last Year: Not on file       SURGICAL HISTORY  Past Surgical History:   Procedure Laterality Date   • PTOSIS REPAIR Left 2/1/2017    Procedure: PTOSIS REPAIR - UPPER (POSTERIOR APPROACH);  Surgeon: Stu Malik M.D.;  Location: SURGERY SURG[facility];  Service:    • IMTIAZ BY LAPAROSCOPY  2014    removal lap band   • GASTRIC BANDING LAPAROSCOPIC  2011   • TONSILLECTOMY  1961   • ABDOMINAL HYSTERECTOMY TOTAL     • APPENDECTOMY CHILD     • EYE SURGERY     • HYSTERECTOMY, TOTAL ABDOMINAL     • MAMMOPLASTY AUGMENTATION         CURRENT MEDICATIONS  Home Medications    **Home medications have not yet been reviewed for this encounter**         ALLERGIES  Allergies   Allergen Reactions   • Sulfa Drugs Swelling     Throat and rash   • Sulfamethoxazole W-Trimethoprim      Other reaction(s): LIP SWELLING   • Sulfamethoxazole-Trimethoprim Hives   • Lasix [Furosemide]    • Tape Swelling       PHYSICAL EXAM  VITAL SIGNS: /89   Pulse 82   Temp 36.6 °C (97.8 °F) (Temporal)   Resp (!) 28   Ht 1.575 m (5' 2\")   Wt 91.2 kg (201 lb 1 oz)   SpO2 96%   BMI 36.77 kg/m²       Constitutional: Patient appears ill but nontoxic.   HENT: Normocephalic, Atraumatic, Bilateral external ears " normal, Oropharynx moist, No oral exudates, Nose normal.   Eyes: PERRLA, EOMI, Conjunctiva normal, No discharge.   Neck: Normal range of motion, No tenderness, Supple, No stridor.   Lymphatic: No lymphadenopathy noted.   Cardiovascular: Normal heart rate, Normal rhythm, No murmurs, No rubs, No gallops.   Thorax & Lungs: Slightly tachypneic, No respiratory distress, No wheezing, No chest tenderness.   Abdomen: Bowel sounds normal, Soft, No tenderness, No masses, No pulsatile masses.   Skin: Warm, Dry, No erythema, No rash.   Back: No tenderness, No CVA tenderness.   Extremities: Intact distal pulses, No edema, No tenderness, No cyanosis, No clubbing.    Neurologic: Alert & oriented x 3, Normal motor function, Normal sensory function, No focal deficits noted.   Psychiatric: Affect normal, Judgment normal, Mood normal.     COURSE & MEDICAL DECISION MAKING  Pertinent Labs & Imaging studies reviewed. (See chart for details)  This is 61-year-old female who presents to the emergency department with signs symptoms consistent with COVID-19 pneumonia.  I did review her chest x-ray.  She is not hypoxic.  She is not toxic.  There is no indication for admission at this time.  She is outside of the window for antivirals as she has been sick for about 5 to 6 days.  The patient will therefore be discharged with instructions to take Tylenol and Motrin and focus on oral hydration.  She will return for increased work of breathing.    FINAL IMPRESSION  1. COVID-19    Disposition  The patient will be discharged in stable condition         Electronically signed by: Chris Short M.D., 1/13/2022 3:51 PM

## 2022-01-13 NOTE — PROGRESS NOTES
Chief Complaint:      HPI:      Ashanti Arvizu is a 61 y.o. female with history of positive COVID test via rapid antigen testing with home kit 10 days ago.  She reports new cough in the past 7 days productive of white to greenish sputum with progressive worsening.  Her cough is persistent and she is complaining of shortness of breath.  She can barely speak.   She reports low-grade fever and chills at home along with body aches.  She has a history of obesity and hypothyroidism but she denies chronic lung disease, asthma, or COPD, denies chronic kidney disease or chronic liver disease.  She reports having been vaccinated for COVID-19 but she denies getting the booster shot.        ROS:   Review of Systems   Constitutional: Positive for chills and fever.   Respiratory: Positive for cough and shortness of breath. Negative for wheezing.    Cardiovascular: Negative for chest pain and palpitations.   Gastrointestinal: Negative for nausea and vomiting.   Musculoskeletal: Positive for myalgias.        Past Medical History:  She   Patient Active Problem List    Diagnosis Date Noted   • Obesity (BMI 35.0-39.9 without comorbidity) (HCC) 02/25/2021   • Recurrent UTI 02/25/2021   • TIA (transient ischemic attack) 06/20/2017   • Postablative hypothyroidism 06/09/2016   • Insomnia 10/31/2014     Past Surgical History:  She   Past Surgical History:   Procedure Laterality Date   • PTOSIS REPAIR Left 2/1/2017    Procedure: PTOSIS REPAIR - UPPER (POSTERIOR APPROACH);  Surgeon: Stu Malik M.D.;  Location: SURGERY SURGICAL [facility];  Service:    • IMTIAZ BY LAPAROSCOPY  2014    removal lap band   • GASTRIC BANDING LAPAROSCOPIC  2011   • TONSILLECTOMY  1961   • ABDOMINAL HYSTERECTOMY TOTAL     • APPENDECTOMY CHILD     • EYE SURGERY     • HYSTERECTOMY, TOTAL ABDOMINAL     • MAMMOPLASTY AUGMENTATION        Allergies:  She Sulfa drugs, Sulfamethoxazole w-trimethoprim, Sulfamethoxazole-trimethoprim, Lasix [furosemide], and Tape   Current  Medications:  She   Current Outpatient Medications:   •  dicyclomine (BENTYL) 20 MG Tab, Take 20 mg by mouth every 6 hours., Disp: , Rfl:   •  alprazolam (XANAX) 0.5 MG Tab, Take 0.25 mg by mouth at bedtime as needed for Anxiety., Disp: , Rfl:   •  levothyroxine (SYNTHROID) 125 MCG Tab, Take 125 mcg by mouth Every morning on an empty stomach., Disp: , Rfl:   •  vitamin D (CHOLECALCIFEROL) 1000 UNIT Tab, Take 1,000 Units by mouth every day., Disp: , Rfl:   •  Omega-3 Fatty Acids (FISH OIL) 1000 MG Cap capsule, Take 1,000 mg by mouth every day., Disp: , Rfl:   •  hydrOXYzine HCl (ATARAX) 25 MG Tab, Take 1 Tablet by mouth 3 times a day as needed for Anxiety. (Patient not taking: Reported on 1/13/2022), Disp: 30 Tablet, Rfl: 0  •  B Complex Vitamins (VITAMIN B COMPLEX PO), Take 1 Tab by mouth every day. (Patient not taking: Reported on 1/13/2022), Disp: , Rfl:   Social History:  She   Social History     Socioeconomic History   • Marital status:      Spouse name: Not on file   • Number of children: Not on file   • Years of education: Not on file   • Highest education level: Not on file   Occupational History   • Not on file   Tobacco Use   • Smoking status: Never Smoker   • Smokeless tobacco: Never Used   Vaping Use   • Vaping Use: Never used   Substance and Sexual Activity   • Alcohol use: Not Currently     Alcohol/week: 0.0 oz     Comment: Occasionally   • Drug use: Not Currently     Types: Marijuana, Oral   • Sexual activity: Not on file   Other Topics Concern   • Not on file   Social History Narrative   • Not on file     Social Determinants of Health     Financial Resource Strain:    • Difficulty of Paying Living Expenses: Not on file   Food Insecurity:    • Worried About Running Out of Food in the Last Year: Not on file   • Ran Out of Food in the Last Year: Not on file   Transportation Needs:    • Lack of Transportation (Medical): Not on file   • Lack of Transportation (Non-Medical): Not on file   Physical  "Activity:    • Days of Exercise per Week: Not on file   • Minutes of Exercise per Session: Not on file   Stress:    • Feeling of Stress : Not on file   Social Connections:    • Frequency of Communication with Friends and Family: Not on file   • Frequency of Social Gatherings with Friends and Family: Not on file   • Attends Orthodoxy Services: Not on file   • Active Member of Clubs or Organizations: Not on file   • Attends Club or Organization Meetings: Not on file   • Marital Status: Not on file   Intimate Partner Violence:    • Fear of Current or Ex-Partner: Not on file   • Emotionally Abused: Not on file   • Physically Abused: Not on file   • Sexually Abused: Not on file   Housing Stability:    • Unable to Pay for Housing in the Last Year: Not on file   • Number of Places Lived in the Last Year: Not on file   • Unstable Housing in the Last Year: Not on file      Family History:   Her   Family History   Problem Relation Age of Onset   • No Known Problems Mother    • Diabetes Father    • No Known Problems Sister    • Heart Disease Brother    • Heart Disease Maternal Grandmother    • Thyroid Maternal Grandmother    • Diabetes Paternal Grandmother    • Cancer Paternal Grandmother         Prostate   • Diabetes Paternal Grandfather    • No Known Problems Son    • No Known Problems Daughter    • No Known Problems Sister    • No Known Problems Brother    • No Known Problems Brother    • No Known Problems Daughter         PHYSICAL EXAM:    Vital signs: /60   Pulse 64   Temp 36.4 °C (97.5 °F) (Temporal)   Resp 14   Ht 1.575 m (5' 2\")   Wt 89.8 kg (198 lb)   SpO2 95%   Breastfeeding No   BMI 36.21 kg/m²   Physical Exam  Constitutional:       Appearance: She is obese. She is ill-appearing and toxic-appearing.   HENT:      Head: Normocephalic and atraumatic.      Nose: Nose normal.      Mouth/Throat:      Mouth: Mucous membranes are moist.      Pharynx: Oropharynx is clear.   Eyes:      Extraocular Movements: " Extraocular movements intact.      Conjunctiva/sclera: Conjunctivae normal.      Pupils: Pupils are equal, round, and reactive to light.   Cardiovascular:      Rate and Rhythm: Normal rate and regular rhythm.      Pulses: Normal pulses.      Heart sounds: Normal heart sounds.   Pulmonary:      Breath sounds: Rhonchi present.   Abdominal:      General: Abdomen is flat. Bowel sounds are normal.      Palpations: Abdomen is soft.   Neurological:      Mental Status: She is alert.         Labs:  Lab Results   Component Value Date/Time    HBA1C 5.7 (H) 04/16/2021 07:29 AM      Lab Results   Component Value Date/Time    CHOLSTRLTOT 218 (H) 04/16/2021 0729    TRIGLYCERIDE 111 04/16/2021 0729    HDL 61 04/16/2021 0729     (H) 06/21/2017 0501     No results found for: MICROALBCALC, MALBCRT, MALBEXCR, ETBIFY97, MICROALBUR, MICRALB, UMICROALBUM, MICROALBTIM   Lab Results   Component Value Date/Time    CREATININE 0.67 09/24/2021 12:35 PM           ASSESSMENT/PLAN:   1. Cough  Chest x-ray consistent with active or recent COVID-pneumonia  Given the patient's age and comorbidities such as obesity she is at high risk for complications or deterioration  I called the transfer center and updated them, patient is going to travel via personal vehicle to [facility] emergency room to get evaluated and triaged for possible admission for COVID-pneumonia  Patient's current oxygen saturation is stable at 95% she is alert and oriented and she is still safe to travel via personal vehicle    2. COVID-19 virus infection  Patient had a positive COVID-19 test      Return if symptoms worsen or fail to improve.       This patient during there office visit was started on new medication.  Side effects of new medications were discussed with the patient today in the office. The patient was supplied paperwork on this new medication.    Differential diagnosis, natural history, supportive care, and indications for immediate follow-up  discussed at length.   Instructed to return to  or nearest emergency department if symptoms fail to improve, for any change in condition, further concerns, or new concerning symptoms.    Patient states understanding of the plan of care and discharge instructions.      Trevor Munoz MD, FACE, ECNU  01/13/22

## 2022-01-14 NOTE — ED NOTES
Dc instructions and medications discussed with patient at bedside. All questions answered at this time. VSS. No IV in place at this time. Pt to lobby without incident. family to drive patient home.

## 2022-01-18 ENCOUNTER — HOSPITAL ENCOUNTER (EMERGENCY)
Facility: MEDICAL CENTER | Age: 62
End: 2022-01-18
Attending: EMERGENCY MEDICINE
Payer: COMMERCIAL

## 2022-01-18 VITALS
SYSTOLIC BLOOD PRESSURE: 144 MMHG | HEIGHT: 62 IN | TEMPERATURE: 98.2 F | DIASTOLIC BLOOD PRESSURE: 80 MMHG | BODY MASS INDEX: 37 KG/M2 | WEIGHT: 201.06 LBS | OXYGEN SATURATION: 94 % | RESPIRATION RATE: 18 BRPM | HEART RATE: 63 BPM

## 2022-01-18 DIAGNOSIS — U09.9 PERSISTENT FATIGUE AFTER COVID-19: ICD-10-CM

## 2022-01-18 DIAGNOSIS — R53.83 PERSISTENT FATIGUE AFTER COVID-19: ICD-10-CM

## 2022-01-18 LAB
ALBUMIN SERPL BCP-MCNC: 3.8 G/DL (ref 3.2–4.9)
ALBUMIN/GLOB SERPL: 1.1 G/DL
ALP SERPL-CCNC: 75 U/L (ref 30–99)
ALT SERPL-CCNC: 15 U/L (ref 2–50)
ANION GAP SERPL CALC-SCNC: 13 MMOL/L (ref 7–16)
AST SERPL-CCNC: 16 U/L (ref 12–45)
BASOPHILS # BLD AUTO: 0.6 % (ref 0–1.8)
BASOPHILS # BLD: 0.04 K/UL (ref 0–0.12)
BILIRUB SERPL-MCNC: 0.3 MG/DL (ref 0.1–1.5)
BUN SERPL-MCNC: 14 MG/DL (ref 8–22)
CALCIUM SERPL-MCNC: 9.2 MG/DL (ref 8.4–10.2)
CHLORIDE SERPL-SCNC: 106 MMOL/L (ref 96–112)
CO2 SERPL-SCNC: 24 MMOL/L (ref 20–33)
CREAT SERPL-MCNC: 0.82 MG/DL (ref 0.5–1.4)
EOSINOPHIL # BLD AUTO: 0.39 K/UL (ref 0–0.51)
EOSINOPHIL NFR BLD: 6.2 % (ref 0–6.9)
ERYTHROCYTE [DISTWIDTH] IN BLOOD BY AUTOMATED COUNT: 40.5 FL (ref 35.9–50)
GLOBULIN SER CALC-MCNC: 3.5 G/DL (ref 1.9–3.5)
GLUCOSE SERPL-MCNC: 145 MG/DL (ref 65–99)
HCT VFR BLD AUTO: 48 % (ref 37–47)
HGB BLD-MCNC: 15.5 G/DL (ref 12–16)
IMM GRANULOCYTES # BLD AUTO: 0.04 K/UL (ref 0–0.11)
IMM GRANULOCYTES NFR BLD AUTO: 0.6 % (ref 0–0.9)
LYMPHOCYTES # BLD AUTO: 2.68 K/UL (ref 1–4.8)
LYMPHOCYTES NFR BLD: 42.8 % (ref 22–41)
MCH RBC QN AUTO: 28.3 PG (ref 27–33)
MCHC RBC AUTO-ENTMCNC: 32.3 G/DL (ref 33.6–35)
MCV RBC AUTO: 87.8 FL (ref 81.4–97.8)
MONOCYTES # BLD AUTO: 0.24 K/UL (ref 0–0.85)
MONOCYTES NFR BLD AUTO: 3.8 % (ref 0–13.4)
NEUTROPHILS # BLD AUTO: 2.87 K/UL (ref 2–7.15)
NEUTROPHILS NFR BLD: 46 % (ref 44–72)
NRBC # BLD AUTO: 0 K/UL
NRBC BLD-RTO: 0 /100 WBC
PLATELET # BLD AUTO: 296 K/UL (ref 164–446)
PMV BLD AUTO: 10.3 FL (ref 9–12.9)
POTASSIUM SERPL-SCNC: 3.8 MMOL/L (ref 3.6–5.5)
PROT SERPL-MCNC: 7.3 G/DL (ref 6–8.2)
RBC # BLD AUTO: 5.47 M/UL (ref 4.2–5.4)
SODIUM SERPL-SCNC: 143 MMOL/L (ref 135–145)
WBC # BLD AUTO: 6.3 K/UL (ref 4.8–10.8)

## 2022-01-18 PROCEDURE — 85025 COMPLETE CBC W/AUTO DIFF WBC: CPT

## 2022-01-18 PROCEDURE — 99283 EMERGENCY DEPT VISIT LOW MDM: CPT

## 2022-01-18 PROCEDURE — 80053 COMPREHEN METABOLIC PANEL: CPT

## 2022-01-18 ASSESSMENT — FIBROSIS 4 INDEX: FIB4 SCORE: 1.08

## 2022-01-19 NOTE — ED NOTES
Patient given discharge instructions questions and concerns addressed   
Patient to room 16 Chart up for ERP   
Patient updated on plan of care   
Universal Safety Interventions

## 2022-01-19 NOTE — ED PROVIDER NOTES
ED Provider Note    CHIEF COMPLAINT  Chief Complaint   Patient presents with   • Fatigue     seen here 5 days ago with a DX of covid pneumonia, not better and feels weaker.       HPI  Ashanti Arvizu is a 61 y.o. female who presents with chief complaint of fatigue.  Patient was diagnosed with COVID-19 pneumonia 5 days ago.  Patient has had symptoms now for approximately 11 days.  Patient reports having some ongoing fatigue and therefore return to the emergency department.  Patient reports that she just feels rundown.  She denies any decreased exertional tolerance from a respiratory standpoint or any dyspnea but reports that she simply feels weak all over.  Patient denies any vomiting.  Patient denies any changes in bowel movements.    REVIEW OF SYSTEMS  ROS  See HPI for further details. All other systems are negative.     PAST MEDICAL HISTORY   has a past medical history of Disorder of thyroid, Heart burn, and Psychiatric problem.    SOCIAL HISTORY  Social History     Tobacco Use   • Smoking status: Never Smoker   • Smokeless tobacco: Never Used   Vaping Use   • Vaping Use: Never used   Substance and Sexual Activity   • Alcohol use: Not Currently     Alcohol/week: 0.0 oz     Comment: Occasionally   • Drug use: Not Currently     Types: Marijuana, Oral     Comment: cbd occ   • Sexual activity: Not on file       SURGICAL HISTORY   has a past surgical history that includes gastric banding laparoscopic (2011); magdalena by laparoscopy (2014); mammoplasty augmentation; tonsillectomy (1961); appendectomy child; hysterectomy, total abdominal; ptosis repair (Left, 2/1/2017); abdominal hysterectomy total; and eye surgery.    CURRENT MEDICATIONS  Home Medications    **Home medications have not yet been reviewed for this encounter**         ALLERGIES  Allergies   Allergen Reactions   • Sulfa Drugs Swelling     Throat and rash   • Sulfamethoxazole W-Trimethoprim      Other reaction(s): LIP SWELLING   • Sulfamethoxazole-Trimethoprim Hives    • Lasix [Furosemide]    • Tape Swelling       PHYSICAL EXAM  Vitals:    01/18/22 1638   BP: 142/78   Pulse: 60   Resp: 18   Temp: 36.3 °C (97.4 °F)   SpO2: 96%       Physical Exam  Constitutional:       Appearance: She is well-developed.   Eyes:      Conjunctiva/sclera: Conjunctivae normal.   Cardiovascular:      Rate and Rhythm: Normal rate and regular rhythm.   Pulmonary:      Effort: Pulmonary effort is normal.      Breath sounds: Normal breath sounds.   Musculoskeletal:      Cervical back: Neck supple.   Skin:     General: Skin is warm and dry.      Findings: No rash.   Neurological:      Mental Status: She is alert and oriented to person, place, and time.   Psychiatric:         Behavior: Behavior normal.       Results for orders placed or performed during the hospital encounter of 01/18/22   CBC WITH DIFFERENTIAL   Result Value Ref Range    WBC 6.3 4.8 - 10.8 K/uL    RBC 5.47 (H) 4.20 - 5.40 M/uL    Hemoglobin 15.5 12.0 - 16.0 g/dL    Hematocrit 48.0 (H) 37.0 - 47.0 %    MCV 87.8 81.4 - 97.8 fL    MCH 28.3 27.0 - 33.0 pg    MCHC 32.3 (L) 33.6 - 35.0 g/dL    RDW 40.5 35.9 - 50.0 fL    Platelet Count 296 164 - 446 K/uL    MPV 10.3 9.0 - 12.9 fL    Neutrophils-Polys 46.00 44.00 - 72.00 %    Lymphocytes 42.80 (H) 22.00 - 41.00 %    Monocytes 3.80 0.00 - 13.40 %    Eosinophils 6.20 0.00 - 6.90 %    Basophils 0.60 0.00 - 1.80 %    Immature Granulocytes 0.60 0.00 - 0.90 %    Nucleated RBC 0.00 /100 WBC    Neutrophils (Absolute) 2.87 2.00 - 7.15 K/uL    Lymphs (Absolute) 2.68 1.00 - 4.80 K/uL    Monos (Absolute) 0.24 0.00 - 0.85 K/uL    Eos (Absolute) 0.39 0.00 - 0.51 K/uL    Baso (Absolute) 0.04 0.00 - 0.12 K/uL    Immature Granulocytes (abs) 0.04 0.00 - 0.11 K/uL    NRBC (Absolute) 0.00 K/uL   CMP   Result Value Ref Range    Sodium 143 135 - 145 mmol/L    Potassium 3.8 3.6 - 5.5 mmol/L    Chloride 106 96 - 112 mmol/L    Co2 24 20 - 33 mmol/L    Anion Gap 13.0 7.0 - 16.0    Glucose 145 (H) 65 - 99 mg/dL    Bun 14 8  - 22 mg/dL    Creatinine 0.82 0.50 - 1.40 mg/dL    Calcium 9.2 8.4 - 10.2 mg/dL    AST(SGOT) 16 12 - 45 U/L    ALT(SGPT) 15 2 - 50 U/L    Alkaline Phosphatase 75 30 - 99 U/L    Total Bilirubin 0.3 0.1 - 1.5 mg/dL    Albumin 3.8 3.2 - 4.9 g/dL    Total Protein 7.3 6.0 - 8.2 g/dL    Globulin 3.5 1.9 - 3.5 g/dL    A-G Ratio 1.1 g/dL   ESTIMATED GFR   Result Value Ref Range    GFR If African American >60 >60 mL/min/1.73 m 2    GFR If Non African American >60 >60 mL/min/1.73 m 2         COURSE & MEDICAL DECISION MAKING  Pertinent Labs & Imaging studies reviewed. (See chart for details)    Patient here with very reassuring vitals.  She is not hypoxic.  Patient is concerned that she is having ongoing fatigue in the setting of COVID.  This appears more secondary to chronic tiredness rather than true dyspnea or pulmonary issue.  Possible long COVID as a cause of etiology.  Depression could also be partially causative here.  We have discussed these with patient.  I will check basic labs to ensure the patient does not have any concerning findings here, these are reassuring.  I discussed these with patient and her partner.  I discussed return precautions.      The patient will return for worsening symptoms and is stable at the time of discharge. The patient verbalizes understanding and will comply.    FINAL IMPRESSION    1. Persistent fatigue after COVID-19               Electronically signed by: Crow Noriega M.D., 1/18/2022 7:48 PM

## 2022-01-19 NOTE — ED TRIAGE NOTES
Chief Complaint   Patient presents with   • Fatigue     seen here 5 days ago with a DX of covid pneumonia, not better and feels weaker.

## 2022-08-20 ENCOUNTER — OFFICE VISIT (OUTPATIENT)
Dept: URGENT CARE | Facility: CLINIC | Age: 62
End: 2022-08-20
Payer: COMMERCIAL

## 2022-08-20 VITALS
DIASTOLIC BLOOD PRESSURE: 80 MMHG | RESPIRATION RATE: 16 BRPM | HEIGHT: 62 IN | SYSTOLIC BLOOD PRESSURE: 132 MMHG | OXYGEN SATURATION: 97 % | HEART RATE: 56 BPM | WEIGHT: 194 LBS | TEMPERATURE: 98.2 F | BODY MASS INDEX: 35.7 KG/M2

## 2022-08-20 DIAGNOSIS — L08.9 LOCAL SKIN INFECTION: ICD-10-CM

## 2022-08-20 DIAGNOSIS — Z98.82 STATUS POST BREAST AUGMENTATION: ICD-10-CM

## 2022-08-20 PROCEDURE — 99214 OFFICE O/P EST MOD 30 MIN: CPT | Performed by: PHYSICIAN ASSISTANT

## 2022-08-20 RX ORDER — CEPHALEXIN 500 MG/1
500 CAPSULE ORAL 3 TIMES DAILY
Qty: 21 CAPSULE | Refills: 0 | Status: SHIPPED | OUTPATIENT
Start: 2022-08-20 | End: 2022-08-27

## 2022-08-20 ASSESSMENT — ENCOUNTER SYMPTOMS
FEVER: 0
CHILLS: 0

## 2022-08-20 ASSESSMENT — FIBROSIS 4 INDEX: FIB4 SCORE: 0.88

## 2022-08-20 NOTE — PROGRESS NOTES
"  Subjective:   Ashanti Arvizu is a 62 y.o. female who presents today with   Chief Complaint   Patient presents with    Suture / Staple Removal     \"I had breast implants done in [place] 1 month 2 weeks ago, and I need the stitches removed\"       Suture / Staple Removal  The sutures were placed More than 14 days ago. She tried nothing since the wound repair. The treatment provided mild relief. There has been no drainage from the wound. There is no redness present. There is no swelling present. There is no pain present.   Patient had breast implants that were leaking and had revision done in [place] and new breast implants placed at that time.  She states that she had 1 follow-up with the surgeon 2 to 3 weeks after the initial surgery and they stated that it was healing well.  They kept few of the stitches still in and wanted her to follow-up again but patient did not want to stay in [place].    PMH:  has a past medical history of Disorder of thyroid, Heart burn, and Psychiatric problem.  MEDS:   Current Outpatient Medications:     cephALEXin (KEFLEX) 500 MG Cap, Take 1 Capsule by mouth 3 times a day for 7 days., Disp: 21 Capsule, Rfl: 0    alprazolam (XANAX) 0.5 MG Tab, Take 0.25 mg by mouth at bedtime as needed for Anxiety., Disp: , Rfl:     levothyroxine (SYNTHROID) 125 MCG Tab, Take 125 mcg by mouth Every morning on an empty stomach., Disp: , Rfl:     B Complex Vitamins (VITAMIN B COMPLEX PO), Take 1 Tablet by mouth every day., Disp: , Rfl:     vitamin D (CHOLECALCIFEROL) 1000 UNIT Tab, Take 1,000 Units by mouth every day., Disp: , Rfl:     Omega-3 Fatty Acids (FISH OIL) 1000 MG Cap capsule, Take 1,000 mg by mouth every day., Disp: , Rfl:     hydrOXYzine HCl (ATARAX) 25 MG Tab, Take 1 Tablet by mouth 3 times a day as needed for Anxiety., Disp: 30 Tablet, Rfl: 0  ALLERGIES:   Allergies   Allergen Reactions    Sulfa Drugs Swelling     Throat and rash    Sulfamethoxazole W-Trimethoprim      Other reaction(s): LIP SWELLING " "   Sulfamethoxazole-Trimethoprim Hives    Lasix [Furosemide]     Tape Swelling     SURGHX:   Past Surgical History:   Procedure Laterality Date    PTOSIS REPAIR Left 2/1/2017    Procedure: PTOSIS REPAIR - UPPER (POSTERIOR APPROACH);  Surgeon: Stu Malik M.D.;  Location: SURGERY SURGICAL [facility];  Service:     IMTIAZ BY LAPAROSCOPY  2014    removal lap band    GASTRIC BANDING LAPAROSCOPIC  2011    TONSILLECTOMY  1961    ABDOMINAL HYSTERECTOMY TOTAL      APPENDECTOMY CHILD      EYE SURGERY      HYSTERECTOMY, TOTAL ABDOMINAL      MAMMOPLASTY AUGMENTATION       SOCHX:  reports that she has never smoked. She has never used smokeless tobacco. She reports that she does not currently use alcohol. She reports that she does not currently use drugs after having used the following drugs: Marijuana and Oral.  FH: Reviewed with patient, not pertinent to this visit.     Review of Systems   Constitutional:  Negative for chills and fever.   Skin:         Breast augmentation, healing      Objective:   /80 (BP Location: Right arm, Patient Position: Sitting, BP Cuff Size: Adult)   Pulse (!) 56   Temp 36.8 °C (98.2 °F) (Temporal)   Resp 16   Ht 1.575 m (5' 2\")   Wt 88 kg (194 lb)   SpO2 97%   BMI 35.48 kg/m²   Physical Exam  Vitals and nursing note reviewed. Exam conducted with a chaperone present.   Constitutional:       General: She is not in acute distress.     Appearance: Normal appearance. She is well-developed. She is not ill-appearing or toxic-appearing.   HENT:      Head: Normocephalic and atraumatic.      Right Ear: Hearing normal.      Left Ear: Hearing normal.   Cardiovascular:      Rate and Rhythm: Normal rate.   Pulmonary:      Effort: Pulmonary effort is normal.   Musculoskeletal:      Comments: Normal movement in all 4 extremities   Skin:     General: Skin is warm and dry.             Comments: Open/ulcerated wound under the left areola.  Couple of tails of the stitches around the areola still running " deep with some surrounding crusting discharge.  No surrounding erythema and no induration or fluctuance appreciated.   Neurological:      Mental Status: She is alert.      Coordination: Coordination normal.   Psychiatric:         Mood and Affect: Mood normal.     Female MA chaperone present today.    Assessment/Plan:   Assessment    1. Status post breast augmentation  - Referral to Plastic Surgery    2. Local skin infection  - cephALEXin (KEFLEX) 500 MG Cap; Take 1 Capsule by mouth 3 times a day for 7 days.  Dispense: 21 Capsule; Refill: 0  Extensively discussed with patient that I would not be responsible for healing process as she should be following up with the surgeon but she is adamant to have sutures removed today at visit.  She is understanding of possible poor outcomes of healing and again consents to having suture removal today.  For removing sutures discussed with patient that I would like to examine the area first.  On exam visualized that there are some stitches but they were running and deep stitches that she is simply seeing the tail of.  Also concern for the way that it is healing at this time and recommend following up with plastic surgery and we will also start her on antibiotics.  With any new or worsening discharge or redness around the breast recommend following up in the ER and she is understanding of this.  Discussed with patient that I do not recommend cutting the stitches at this time as they will still be deep under the skin and continue to possibly set her up for infection.  She agrees.  There were some superficial stitches externally that were easily removed and seemed as though they had already been cut previously.    Please note that this dictation was created using voice recognition software. I have made every reasonable attempt to correct obvious errors, but I expect that there are errors of grammar and possibly content that I did not discover before finalizing the note.    Trevon  Gato GRAVES

## 2022-10-16 ENCOUNTER — OFFICE VISIT (OUTPATIENT)
Dept: URGENT CARE | Facility: CLINIC | Age: 62
End: 2022-10-16
Payer: COMMERCIAL

## 2022-10-16 VITALS
RESPIRATION RATE: 16 BRPM | HEIGHT: 62 IN | DIASTOLIC BLOOD PRESSURE: 84 MMHG | BODY MASS INDEX: 35.51 KG/M2 | SYSTOLIC BLOOD PRESSURE: 132 MMHG | HEART RATE: 58 BPM | TEMPERATURE: 97.4 F | WEIGHT: 193 LBS | OXYGEN SATURATION: 96 %

## 2022-10-16 DIAGNOSIS — J01.41 ACUTE RECURRENT PANSINUSITIS: ICD-10-CM

## 2022-10-16 PROCEDURE — 99213 OFFICE O/P EST LOW 20 MIN: CPT | Performed by: NURSE PRACTITIONER

## 2022-10-16 RX ORDER — ONDANSETRON 4 MG/1
4 TABLET, ORALLY DISINTEGRATING ORAL EVERY 6 HOURS PRN
Qty: 15 TABLET | Refills: 0 | Status: SHIPPED | OUTPATIENT
Start: 2022-10-16 | End: 2023-07-01

## 2022-10-16 RX ORDER — CEFDINIR 300 MG/1
300 CAPSULE ORAL 2 TIMES DAILY
Qty: 20 CAPSULE | Refills: 0 | Status: SHIPPED | OUTPATIENT
Start: 2022-10-16 | End: 2022-10-26

## 2022-10-16 ASSESSMENT — FIBROSIS 4 INDEX: FIB4 SCORE: 0.88

## 2022-10-16 NOTE — PROGRESS NOTES
Patient has consented to treatment and for use of patient information for treatment and billing purposes.    Date: 10/16/22     Arrival Mode: Private Vehicle / Ambulatory    Chief Complaint:    Chief Complaint   Patient presents with    Sinus Problem     X2 weeks, possible sinus infection, took amoxicillin on Saturday through up right after     Fatigue     Feeling weak         History of Present Illness: 62 y.o. female  presents to clinic with 2-week history of sinus pain and pressure.  Patient states over the past day or so she has also had fatigue.  No ear pain sore throat or cough.  Patient states she was seen at [place] urgent care and prescribed Augmentin.  After 1 dose of Augmentin it did cause profuse vomiting.  Patient states she did take the medication with food.  Patient has not had any vomiting since that night.  Although presents to clinic with continued sinus infection and requesting alternate treatment.  Patient's only allergy to antibiotics and sulfa antibiotics.      ROS:  As stated in HPI     Pertinent Medical History:    Past Medical History:   Diagnosis Date    Disorder of thyroid     Heart burn     Psychiatric problem     anxiety        Pertinent Surgical History:    Past Surgical History:   Procedure Laterality Date    PTOSIS REPAIR Left 2/1/2017    Procedure: PTOSIS REPAIR - UPPER (POSTERIOR APPROACH);  Surgeon: Stu Malik M.D.;  Location: SURGERY SURGICAL [facility];  Service:     IMTIAZ BY LAPAROSCOPY  2014    removal lap band    GASTRIC BANDING LAPAROSCOPIC  2011    TONSILLECTOMY  1961    ABDOMINAL HYSTERECTOMY TOTAL      APPENDECTOMY CHILD      EYE SURGERY      HYSTERECTOMY, TOTAL ABDOMINAL      MAMMOPLASTY AUGMENTATION          Current  Medications:  Current Outpatient Medications on File Prior to Visit   Medication Sig Dispense Refill    alprazolam (XANAX) 0.5 MG Tab Take 0.25 mg by mouth at bedtime as needed for Anxiety.      levothyroxine (SYNTHROID) 125 MCG Tab Take 125 mcg by  mouth Every morning on an empty stomach.      B Complex Vitamins (VITAMIN B COMPLEX PO) Take 1 Tablet by mouth every day.      vitamin D (CHOLECALCIFEROL) 1000 UNIT Tab Take 1,000 Units by mouth every day.      Omega-3 Fatty Acids (FISH OIL) 1000 MG Cap capsule Take 1,000 mg by mouth every day.      hydrOXYzine HCl (ATARAX) 25 MG Tab Take 1 Tablet by mouth 3 times a day as needed for Anxiety. (Patient not taking: Reported on 10/16/2022) 30 Tablet 0     No current facility-administered medications on file prior to visit.        Allergies:     Sulfa drugs, Sulfamethoxazole w-trimethoprim, Sulfamethoxazole-trimethoprim, Lasix [furosemide], and Tape     Social History:   Social History     Socioeconomic History    Marital status:      Spouse name: Not on file    Number of children: Not on file    Years of education: Not on file    Highest education level: Not on file   Occupational History    Not on file   Tobacco Use    Smoking status: Never    Smokeless tobacco: Never   Vaping Use    Vaping Use: Never used   Substance and Sexual Activity    Alcohol use: Not Currently     Alcohol/week: 0.0 oz     Comment: Occasionally    Drug use: Not Currently     Types: Marijuana, Oral     Comment: cbd occ    Sexual activity: Not on file   Other Topics Concern    Not on file   Social History Narrative    Not on file     Social Determinants of Health     Financial Resource Strain: Not on file   Food Insecurity: Not on file   Transportation Needs: Not on file   Physical Activity: Not on file   Stress: Not on file   Social Connections: Not on file   Intimate Partner Violence: Not on file   Housing Stability: Not on file        No LMP recorded. Patient has had a hysterectomy.       Physical Exam:  Vitals:    10/16/22 1547   BP: 132/84   Pulse: (!) 58   Resp: 16   Temp: 36.3 °C (97.4 °F)   SpO2: 96%        Physical Exam  Vitals reviewed.   Constitutional:       General: She is not in acute distress.     Appearance: Normal  appearance. She is well-developed. She is not toxic-appearing.   HENT:      Head: Normocephalic and atraumatic.      Right Ear: Tympanic membrane, ear canal and external ear normal.      Left Ear: Tympanic membrane, ear canal and external ear normal.      Nose: Congestion and rhinorrhea present.      Right Sinus: Maxillary sinus tenderness (Visible swelling to the right maxillary sinus) and frontal sinus tenderness present.      Left Sinus: Maxillary sinus tenderness and frontal sinus tenderness present.      Mouth/Throat:      Lips: Pink.      Mouth: Mucous membranes are moist.      Pharynx: Posterior oropharyngeal erythema present.   Eyes:      General: Lids are normal. Gaze aligned appropriately. No allergic shiner or scleral icterus.     Extraocular Movements: Extraocular movements intact.      Conjunctiva/sclera: Conjunctivae normal.      Pupils: Pupils are equal, round, and reactive to light.   Cardiovascular:      Rate and Rhythm: Normal rate and regular rhythm.      Pulses:           Radial pulses are 2+ on the right side and 2+ on the left side.      Heart sounds: Normal heart sounds.   Pulmonary:      Effort: Pulmonary effort is normal.      Breath sounds: Normal breath sounds. No wheezing.   Musculoskeletal:      Right lower leg: No edema.      Left lower leg: No edema.   Lymphadenopathy:      Cervical: No cervical adenopathy.   Skin:     General: Skin is warm.      Capillary Refill: Capillary refill takes less than 2 seconds.      Coloration: Skin is not cyanotic or pale.      Findings: No rash.   Neurological:      Mental Status: She is alert.      Gait: Gait is intact.   Psychiatric:         Behavior: Behavior normal. Behavior is cooperative.        Diagnostics:    None   Medical Decision Making:  I personally reviewed prior external notes and test results   Shared decision-making was utilized with patient did develop treatment plan and clinic course. Pleasant, 62 y.o. female 2-week history of sinus  pain and pressure.  Unfortunately patient did not tolerate the Augmentin she was prescribed at a different clinic.  Will send for Omnicef at this time.  We will send for Zofran and an abundance of caution encourage patient to take with food.  If patient is not able to tolerate this medication did encourage her to contact me via her MyChart so I can prescribe alternate treatment.    Did advise patient on conservative measures for management of symptoms.  Patient is agreeable to pursue adequate rest, adequate hydration, saltwater gargle and Neti pot for any symptoms of upper respiratory congestion.  Over-the-counter analgesia and antipyretics on a p.r.n. basis as needed for pain and fever.  Did discuss over-the-counter cough medications.      Patient will monitor symptoms closely for worsening and is advised to seek further evaluation the emergency room if alarm signs or symptoms arise.  Patient states understanding and verbalizes agreement with this plan of care.    Plan:    1. Acute recurrent pansinusitis    - cefdinir (OMNICEF) 300 MG Cap; Take 1 Capsule by mouth 2 times a day for 10 days.  Dispense: 20 Capsule; Refill: 0  - ondansetron (ZOFRAN ODT) 4 MG TABLET DISPERSIBLE; Take 1 Tablet by mouth every 6 hours as needed for Nausea for up to 15 doses.  Dispense: 15 Tablet; Refill: 0           Disposition:  Patient was discharged in stable condition.    Voice Recognition Disclaimer: Portions of this document were created using voice recognition software. The software does have a chance of producing errors of grammar and possibly content. I have made every reasonable attempt to correct obvious errors, but there may be errors of grammar and possibly content that I did not discover before finalizing the documentation.    Vanda Black, A.P.R.N.

## 2022-11-08 ENCOUNTER — OFFICE VISIT (OUTPATIENT)
Dept: URGENT CARE | Facility: CLINIC | Age: 62
End: 2022-11-08
Payer: COMMERCIAL

## 2022-11-08 ENCOUNTER — APPOINTMENT (OUTPATIENT)
Dept: RADIOLOGY | Facility: IMAGING CENTER | Age: 62
End: 2022-11-08
Attending: FAMILY MEDICINE
Payer: COMMERCIAL

## 2022-11-08 VITALS
RESPIRATION RATE: 18 BRPM | OXYGEN SATURATION: 97 % | DIASTOLIC BLOOD PRESSURE: 76 MMHG | HEART RATE: 78 BPM | BODY MASS INDEX: 35.51 KG/M2 | HEIGHT: 62 IN | SYSTOLIC BLOOD PRESSURE: 124 MMHG | WEIGHT: 193 LBS | TEMPERATURE: 98.4 F

## 2022-11-08 DIAGNOSIS — R05.1 ACUTE COUGH: ICD-10-CM

## 2022-11-08 DIAGNOSIS — J40 BRONCHITIS: ICD-10-CM

## 2022-11-08 PROCEDURE — 99213 OFFICE O/P EST LOW 20 MIN: CPT | Performed by: FAMILY MEDICINE

## 2022-11-08 PROCEDURE — 71046 X-RAY EXAM CHEST 2 VIEWS: CPT | Mod: TC,FY | Performed by: RADIOLOGY

## 2022-11-08 RX ORDER — PREDNISONE 20 MG/1
20 TABLET ORAL DAILY
Qty: 5 TABLET | Refills: 0 | Status: SHIPPED | OUTPATIENT
Start: 2022-11-08 | End: 2022-11-13

## 2022-11-08 ASSESSMENT — FIBROSIS 4 INDEX: FIB4 SCORE: 0.88

## 2022-11-08 NOTE — PROGRESS NOTES
"  Subjective:      62 y.o. female presents to urgent care for cough that has been present for 3 weeks. She was seen here in urgent care 10/16/2022 for similar symptoms and was given cefdinir for pansinusitis.  Currently she denies any other cold symptoms such as sore throat, fever, or body aches. She denies any tobacco product use.  She has a history of childhood asthma, but no longer needs any medication for this.  She is fully vaccinated against COVID.  No known sick contacts.    She denies any other questions or concerns at this time.    Current problem list, medication, and past medical/surgical history were reviewed in Epic.    ROS  See HPI     Objective:      /76   Pulse 78   Temp 36.9 °C (98.4 °F) (Temporal)   Resp 18   Ht 1.575 m (5' 2\")   Wt 87.5 kg (193 lb)   SpO2 97%   BMI 35.30 kg/m²     Physical Exam  Constitutional:       General: She is not in acute distress.     Appearance: She is not diaphoretic.   HENT:      Right Ear: Tympanic membrane, ear canal and external ear normal.      Left Ear: Tympanic membrane, ear canal and external ear normal.      Mouth/Throat:      Tongue: Tongue does not deviate from midline.      Palate: No lesions.      Pharynx: No posterior oropharyngeal erythema.      Tonsils: No tonsillar exudate. 0 on the right. 0 on the left.   Cardiovascular:      Rate and Rhythm: Normal rate and regular rhythm.      Heart sounds: Normal heart sounds.   Pulmonary:      Effort: Pulmonary effort is normal. No respiratory distress.      Breath sounds: Normal breath sounds.   Neurological:      Mental Status: She is alert.   Psychiatric:         Mood and Affect: Affect normal.         Judgment: Judgment normal.     Assessment/Plan:     1. Acute cough  2. Bronchitis  CXR showing no acute cardiopulmonary processes.  Most likely related to viral illness.  Prescription for prednisone has been sent.    - DX-CHEST-2 VIEWS; Future  - predniSONE (DELTASONE) 20 MG Tab; Take 1 Tablet by mouth " every day for 5 days.  Dispense: 5 Tablet; Refill: 0      Instructed to return to Urgent Care or nearest Emergency Department if symptoms fail to improve, for any change in condition, further concerns, or new concerning symptoms. Patient states understanding of the plan of care and discharge instructions.    Jenny Juarez M.D.

## 2023-06-02 ENCOUNTER — OFFICE VISIT (OUTPATIENT)
Dept: URGENT CARE | Facility: CLINIC | Age: 63
End: 2023-06-02
Payer: COMMERCIAL

## 2023-06-02 VITALS
RESPIRATION RATE: 16 BRPM | HEIGHT: 62 IN | SYSTOLIC BLOOD PRESSURE: 130 MMHG | HEART RATE: 64 BPM | BODY MASS INDEX: 37.73 KG/M2 | DIASTOLIC BLOOD PRESSURE: 80 MMHG | WEIGHT: 205 LBS | TEMPERATURE: 97.8 F | OXYGEN SATURATION: 95 %

## 2023-06-02 DIAGNOSIS — R10.13 EPIGASTRIC PAIN: ICD-10-CM

## 2023-06-02 DIAGNOSIS — B02.7 DISSEMINATED ZOSTER: ICD-10-CM

## 2023-06-02 DIAGNOSIS — S39.012A LUMBOSACRAL STRAIN, INITIAL ENCOUNTER: ICD-10-CM

## 2023-06-02 DIAGNOSIS — M62.838 MUSCLE SPASM: ICD-10-CM

## 2023-06-02 PROCEDURE — 99214 OFFICE O/P EST MOD 30 MIN: CPT

## 2023-06-02 PROCEDURE — 3075F SYST BP GE 130 - 139MM HG: CPT

## 2023-06-02 PROCEDURE — 3079F DIAST BP 80-89 MM HG: CPT

## 2023-06-02 RX ORDER — METHYLPREDNISOLONE 4 MG/1
TABLET ORAL
Qty: 21 TABLET | Refills: 0 | Status: SHIPPED | OUTPATIENT
Start: 2023-06-02 | End: 2023-07-01

## 2023-06-02 RX ORDER — CLONAZEPAM 0.5 MG/1
TABLET ORAL
COMMUNITY
Start: 2023-04-18 | End: 2023-06-09

## 2023-06-02 RX ORDER — DOXYCYCLINE HYCLATE 100 MG
TABLET ORAL
COMMUNITY
Start: 2023-04-12 | End: 2023-07-01

## 2023-06-02 RX ORDER — VALACYCLOVIR HYDROCHLORIDE 1 G/1
1000 TABLET, FILM COATED ORAL 3 TIMES DAILY
Qty: 21 TABLET | Refills: 0 | Status: SHIPPED | OUTPATIENT
Start: 2023-06-02 | End: 2023-06-09

## 2023-06-02 RX ORDER — OMEPRAZOLE 10 MG/1
10 CAPSULE, DELAYED RELEASE ORAL DAILY
Qty: 14 CAPSULE | Refills: 0 | Status: SHIPPED | OUTPATIENT
Start: 2023-06-02 | End: 2023-06-16

## 2023-06-02 RX ORDER — CYCLOBENZAPRINE HCL 5 MG
5-10 TABLET ORAL NIGHTLY PRN
Qty: 10 TABLET | Refills: 0 | Status: SHIPPED | OUTPATIENT
Start: 2023-06-02 | End: 2023-06-07

## 2023-06-02 ASSESSMENT — ENCOUNTER SYMPTOMS
TINGLING: 1
NUMBNESS: 0
PERIANAL NUMBNESS: 0
PARESIS: 0
FEVER: 0
DIARRHEA: 0
VOMITING: 0
BOWEL INCONTINENCE: 0
ABDOMINAL PAIN: 0
FLANK PAIN: 0
HEADACHES: 0
MYALGIAS: 0
NAUSEA: 0
PARESTHESIAS: 0
FALLS: 0
NECK PAIN: 0
WEAKNESS: 0
ROS SKIN COMMENTS: VESICLES
LEG PAIN: 0
BACK PAIN: 1
WEIGHT LOSS: 0

## 2023-06-02 ASSESSMENT — FIBROSIS 4 INDEX: FIB4 SCORE: 0.89

## 2023-06-02 NOTE — PROGRESS NOTES
Subjective:     Ashanti Arvizu is a 63 y.o. female who presents for Back Pain (X 7 days, RT lower back pain, concerned about shingles.)      Back Pain  This is a new problem. Episode onset: one week. The problem has been gradually worsening since onset. Pain location: right low back. The quality of the pain is described as stabbing and burning. Associated symptoms include tingling. Pertinent negatives include no abdominal pain, bladder incontinence, bowel incontinence, chest pain, dysuria, fever, headaches, leg pain, numbness, paresis, paresthesias, pelvic pain, perianal numbness, weakness or weight loss. (Spasms ) She has tried nothing for the symptoms.     The patient does not recall injury to the area, but endorses starting a stretching class.     Review of Systems   Constitutional:  Negative for fever, malaise/fatigue and weight loss.   Cardiovascular:  Negative for chest pain.   Gastrointestinal:  Negative for abdominal pain, bowel incontinence, diarrhea, nausea and vomiting.   Genitourinary:  Negative for bladder incontinence, dysuria, flank pain, frequency, hematuria, pelvic pain and urgency.   Musculoskeletal:  Positive for back pain. Negative for falls, joint pain, myalgias and neck pain.   Skin:  Positive for rash. Negative for itching.        Vesicles    Neurological:  Positive for tingling. Negative for weakness, numbness, headaches and paresthesias.   All other systems reviewed and are negative.      PMH:   Past Medical History:   Diagnosis Date    Disorder of thyroid     Heart burn     Psychiatric problem     anxiety     ALLERGIES:   Allergies   Allergen Reactions    Sulfa Drugs Swelling     Throat and rash    Sulfamethoxazole W-Trimethoprim      Other reaction(s): LIP SWELLING    Sulfamethoxazole-Trimethoprim Hives    Lasix [Furosemide]     Tape Swelling     SURGHX:   Past Surgical History:   Procedure Laterality Date    PTOSIS REPAIR Left 2/1/2017    Procedure: PTOSIS REPAIR - UPPER (POSTERIOR APPROACH);  " Surgeon: Stu Malik M.D.;  Location: [facility];  Service:     IMTIAZ BY LAPAROSCOPY  2014    removal lap band    GASTRIC BANDING LAPAROSCOPIC  2011    TONSILLECTOMY  1961    ABDOMINAL HYSTERECTOMY TOTAL      APPENDECTOMY CHILD      EYE SURGERY      HYSTERECTOMY, TOTAL ABDOMINAL      MAMMOPLASTY AUGMENTATION       SOCHX:   Social History     Socioeconomic History    Marital status:    Tobacco Use    Smoking status: Never    Smokeless tobacco: Never   Vaping Use    Vaping Use: Never used   Substance and Sexual Activity    Alcohol use: Not Currently     Alcohol/week: 0.0 oz     Comment: Occasionally    Drug use: Not Currently     Types: Marijuana, Oral     Comment: cbd occ     FH:   Family History   Problem Relation Age of Onset    No Known Problems Mother     Diabetes Father     No Known Problems Sister     Heart Disease Brother     Heart Disease Maternal Grandmother     Thyroid Maternal Grandmother     Diabetes Paternal Grandmother     Cancer Paternal Grandmother         Prostate    Diabetes Paternal Grandfather     No Known Problems Son     No Known Problems Daughter     No Known Problems Sister     No Known Problems Brother     No Known Problems Brother     No Known Problems Daughter          Objective:   /80   Pulse 64   Temp 36.6 °C (97.8 °F) (Temporal)   Resp 16   Ht 1.575 m (5' 2\")   Wt 93 kg (205 lb)   SpO2 95%   BMI 37.49 kg/m²     Physical Exam  Constitutional:       Appearance: Normal appearance.   HENT:      Head: Normocephalic and atraumatic.      Nose: Nose normal.      Mouth/Throat:      Mouth: Mucous membranes are moist.   Eyes:      Extraocular Movements: Extraocular movements intact.      Conjunctiva/sclera: Conjunctivae normal.      Pupils: Pupils are equal, round, and reactive to light.   Cardiovascular:      Rate and Rhythm: Normal rate and regular rhythm.   Pulmonary:      Effort: Pulmonary effort is normal.   Musculoskeletal:         General: Normal range " of motion.      Cervical back: Normal range of motion.        Legs:       Comments: Paraspinal TTP and the right lumbosacral area.  No overlying erythema, deformity.   Skin:     General: Skin is warm and dry.      Findings: Rash present. Rash is vesicular.             Comments: Erythematous vesicular rash present on the L1-L2 dermatome.   Neurological:      Mental Status: She is alert.   Psychiatric:         Mood and Affect: Mood normal.         Behavior: Behavior normal.         Thought Content: Thought content normal.       Assessment/Plan:   Assessment      1. Lumbosacral strain, initial encounter  - Referral back to [facility]CP  - methylPREDNISolone (MEDROL DOSEPAK) 4 MG Tablet Therapy Pack; Follow schedule on package instructions.  Dispense: 21 Tablet; Refill: 0  - cyclobenzaprine (FLEXERIL) 5 mg tablet; Take 1-2 Tablets by mouth at bedtime as needed for Muscle Spasms for up to 5 days.  Dispense: 10 Tablet; Refill: 0    2. Muscle spasm  - cyclobenzaprine (FLEXERIL) 5 mg tablet; Take 1-2 Tablets by mouth at bedtime as needed for Muscle Spasms for up to 5 days.  Dispense: 10 Tablet; Refill: 0    Prescription for Medrol Dosepak and cyclobenzaprine sent to patient's preferred pharmacy.  Educated patient that she should not take NSAIDs while taking glucocorticoids. Reviewed the patient's [employer] Query. Advised the patient to only take this medication at night, as it may cause drowsiness. Instructed the patient not to take the medication while at work, driving, or operating machinery.  Instructed the patient not to drink alcohol or take any benzodiazepines while taking this medication.  Patient to follow-up with primary care to monitor for symptom resolution.  Encourage patient to engage in gentle range of motion as tolerated.    3. Disseminated zoster  - valacyclovir (VALTREX) 1 GM Tab; Take 1 Tablet by mouth 3 times a day for 7 days.  Dispense: 21 Tablet; Refill: 0  - methylPREDNISolone (MEDROL DOSEPAK) 4 MG  Tablet Therapy Pack; Follow schedule on package instructions.  Dispense: 21 Tablet; Refill: 0    Prescription for valacyclovir sent to patient's preferred pharmacy for the treatment of disseminated zoster consistent with the posterior L1 and L2 dermatomes.  Supportive care discussed with patient. Advised to avoid immunocompromised, children without hx of varicella and/or vaccine, and pregnant women. Advised not to touch rash.  May cover if she chooses.  Keep clean and dry.  Do not apply topical creams/ointments.      4. Epigastric pain  - omeprazole (PRILOSEC) 10 MG CAPSULE DELAYED RELEASE; Take 1 Capsule by mouth every day for 14 days.  Dispense: 14 Capsule; Refill: 0    Patient reports having minor epigastric pain possibly due to increased use of ibuprofen.  Recommended 14-days of omeprazole to help heal the stomach.  Medication instruction provided to patient.    All questions answered. Patient verbalized understanding and is in agreement with this plan of care. If symptoms are worsening or not improving in 3-5 days, follow-up with PCP or return to UC. Differential diagnosis, natural history, and supportive care discussed. AVS handout given and reviewed with patient. Patient educated on red flags and when to seek treatment back in ED or UC.     I personally reviewed prior external notes and test results pertinent to today's visit.  I have independently reviewed and interpreted all diagnostics ordered during this urgent care visit.  I spent approximately 40 to 45 minutes assessing the patient as well as providing education on symptomatic care for shingles and lumbosacral strain.    This dictation has been created using voice recognition software. The accuracy of the dictation is limited by the abilities of the software. I expect there may be some errors of grammar and possibly content. I made every attempt to manually correct the errors within my dictation. However, errors related to voice recognition software may  still exist and should be interpreted within the appropriate context.    This note was electronically signed by YASH Rodrigues

## 2023-06-02 NOTE — PATIENT INSTRUCTIONS
Shingles    Shingles is an infection. It gives you a painful skin rash and blisters that have fluid in them. Shingles is caused by the same germ (virus) that causes chickenpox.  Shingles only happens in people who:  Have had chickenpox.  Have been given a shot of medicine (vaccine) to protect against chickenpox. Shingles is rare in this group.  The first symptoms of shingles may be itching, tingling, or pain in an area on your skin. A rash will show on your skin a few days or weeks later. The rash is likely to be on one side of your body. The rash usually has a shape like a belt or a band. Over time, the rash turns into fluid-filled blisters. The blisters will break open, change into scabs, and dry up. Medicines may:  Help with pain and itching.  Help you get better sooner.  Help to prevent long-term problems.  Follow these instructions at home:  Medicines  Take over-the-counter and prescription medicines only as told by your doctor.  Put on an anti-itch cream or numbing cream where you have a rash, blisters, or scabs. Do this as told by your doctor.  Helping with itching and discomfort    Put cold, wet cloths (cold compresses) on the area of the rash or blisters as told by your doctor.  Cool baths can help you feel better. Try adding baking soda or dry oatmeal to the water to lessen itching. Do not bathe in hot water.  Blister and rash care  Keep your rash covered with a loose bandage (dressing).  Wear loose clothing that does not rub on your rash.  Keep your rash and blisters clean. To do this, wash the area with mild soap and cool water as told by your doctor.  Check your rash every day for signs of infection. Check for:  More redness, swelling, or pain.  Fluid or blood.  Warmth.  Pus or a bad smell.  Do not scratch your rash. Do not pick at your blisters. To help you to not scratch:  Keep your fingernails clean and cut short.  Wear gloves or mittens when you sleep, if scratching is a problem.  General  instructions  Rest as told by your doctor.  Keep all follow-up visits as told by your doctor. This is important.  Wash your hands often with soap and water. If soap and water are not available, use hand . Doing this lowers your chance of getting a skin infection caused by germs (bacteria).  Your infection can cause chickenpox in people who have never had chickenpox or never got a shot of chickenpox vaccine. If you have blisters that did not change into scabs yet, try not to touch other people or be around other people, especially:  Babies.  Pregnant women.  Children who have areas of red, itchy, or rough skin (eczema).  Very old people who have transplants.  People who have a long-term (chronic) sickness, like cancer or AIDS.  Contact a doctor if:  Your pain does not get better with medicine.  Your pain does not get better after the rash heals.  You have any signs of infection in the rash area. These signs include:  More redness, swelling, or pain around the rash.  Fluid or blood coming from the rash.  The rash area feeling warm to the touch.  Pus or a bad smell coming from the rash.  Get help right away if:  The rash is on your face or nose.  You have pain in your face or pain by your eye.  You lose feeling on one side of your face.  You have trouble seeing.  You have ear pain, or you have ringing in your ear.  You have a loss of taste.  Your condition gets worse.  Summary  Shingles gives you a painful skin rash and blisters that have fluid in them.  Shingles is an infection. It is caused by the same germ (virus) that causes chickenpox.  Keep your rash covered with a loose bandage (dressing). Wear loose clothing that does not rub on your rash.  If you have blisters that did not change into scabs yet, try not to touch other people or be around people.  This information is not intended to replace advice given to you by your health care provider. Make sure you discuss any questions you have with your health  care provider.  Document Released: 06/05/2009 Document Revised: 04/10/2020 Document Reviewed: 08/22/2018  Elsevier Patient Education © 2020 [employer]

## 2023-06-09 ENCOUNTER — OFFICE VISIT (OUTPATIENT)
Dept: MEDICAL GROUP | Facility: LAB | Age: 63
End: 2023-06-09
Payer: COMMERCIAL

## 2023-06-09 VITALS
SYSTOLIC BLOOD PRESSURE: 126 MMHG | HEIGHT: 62 IN | HEART RATE: 74 BPM | DIASTOLIC BLOOD PRESSURE: 82 MMHG | RESPIRATION RATE: 16 BRPM | OXYGEN SATURATION: 97 % | TEMPERATURE: 97.1 F | BODY MASS INDEX: 37.54 KG/M2 | WEIGHT: 204 LBS

## 2023-06-09 DIAGNOSIS — B02.9 HERPES ZOSTER WITHOUT COMPLICATION: ICD-10-CM

## 2023-06-09 DIAGNOSIS — Z12.31 ENCOUNTER FOR SCREENING MAMMOGRAM FOR MALIGNANT NEOPLASM OF BREAST: ICD-10-CM

## 2023-06-09 DIAGNOSIS — Z13.220 LIPID SCREENING: ICD-10-CM

## 2023-06-09 DIAGNOSIS — Z76.89 ENCOUNTER TO ESTABLISH CARE: ICD-10-CM

## 2023-06-09 DIAGNOSIS — E66.9 OBESITY (BMI 35.0-39.9 WITHOUT COMORBIDITY): ICD-10-CM

## 2023-06-09 DIAGNOSIS — Z13.29 SCREENING FOR THYROID DISORDER: ICD-10-CM

## 2023-06-09 DIAGNOSIS — F41.9 ANXIETY: ICD-10-CM

## 2023-06-09 PROCEDURE — 3079F DIAST BP 80-89 MM HG: CPT | Performed by: PHYSICIAN ASSISTANT

## 2023-06-09 PROCEDURE — 99214 OFFICE O/P EST MOD 30 MIN: CPT | Performed by: PHYSICIAN ASSISTANT

## 2023-06-09 PROCEDURE — 3074F SYST BP LT 130 MM HG: CPT | Performed by: PHYSICIAN ASSISTANT

## 2023-06-09 RX ORDER — BUSPIRONE HYDROCHLORIDE 5 MG/1
5 TABLET ORAL 2 TIMES DAILY
Qty: 60 TABLET | Refills: 0 | Status: SHIPPED | OUTPATIENT
Start: 2023-06-09 | End: 2023-07-03

## 2023-06-09 RX ORDER — LIDOCAINE AND PRILOCAINE 25; 25 MG/G; MG/G
1 CREAM TOPICAL PRN
Qty: 30 G | Refills: 1 | Status: SHIPPED | OUTPATIENT
Start: 2023-06-09 | End: 2023-06-16

## 2023-06-09 RX ORDER — CLONAZEPAM 0.5 MG/1
0.5 TABLET ORAL NIGHTLY
Qty: 30 TABLET | Refills: 0 | Status: SHIPPED | OUTPATIENT
Start: 2023-06-09 | End: 2023-07-09

## 2023-06-09 ASSESSMENT — FIBROSIS 4 INDEX: FIB4 SCORE: 0.89

## 2023-06-09 ASSESSMENT — PATIENT HEALTH QUESTIONNAIRE - PHQ9: CLINICAL INTERPRETATION OF PHQ2 SCORE: 0

## 2023-06-09 NOTE — PROGRESS NOTES
Subjective:     CC:  Diagnoses of Encounter to establish care, Herpes zoster without complication, Anxiety, Obesity (BMI 35.0-39.9 without comorbidity) (HCC), Lipid screening, Screening for thyroid disorder, and Encounter for screening mammogram for malignant neoplasm of breast were pertinent to this visit.    HISTORY OF THE PRESENT ILLNESS: Patient is a 63 y.o. female. This pleasant patient is here today to establish care and discuss several concerns. His/her prior PCP was Dr. Cardoso.    Shingles    Anxiety  Chronic condition  Previously tried Xanax, Paxil, Lexapro, Trazadone (weight gain).  Patient felt she did have some symptom control with Xanax but this is not covered by her insurance.  Her previous PCP switched her to clonazepam  History of withdrawal symptoms with the discontinuation of Xanax in 2021  Takes Clonazepam 0.5mg QD  Also taking THC/CBD gummies, patient is likely to worsen her anxiety however, plans to quit.  Patient is agreeable to trial of BuSpar      Urgent care visit  Recent urgent care visit 06/02/2023  Treated for shingles and lumbosacral pain with Valtrex, cyclobenzaprine, Medrol Dosepak    Had breast implants replaced July 2022 - due for updated mammo    Health Maintenance     - Dyslipidemia (30-45): Ordered  - Diabetes (HTN, HLD, BMI >25): Ordered  - Depression screening (PHQ-2 and/or PHQ-9): Negative  - Dental: due  - Eye: due  Diet: avoids gluten  Exercise: sedentary  Substance Use: denies  Tobacco Use/counseling: denies       Cancer screening  - Colon CA (45-75) - FIT (annual) cspy (q10yr): Completed 2020  - Cervical CA (21-65): Status post hysterectomy  -  HX Abnormal pap/HPV: none  - Breast CA: mammo (required 50-75yo) or starting 40 (ACOG, ACR), 45 (ACS), 50 (USPTF): Ordered     Infectious disease screening/Immunizations  --Immunizations: Due for second dose of Twinrix, declines today, plan for shingles vaccine in 12/2023      Current Outpatient Medications Ordered in Epic    Medication Sig Dispense Refill    lidocaine-prilocaine (EMLA) 2.5-2.5 % Cream Apply 1 Application topically as needed (for shingles pain) for up to 7 days. 30 g 1    busPIRone (BUSPAR) 5 MG tablet Take 1 Tablet by mouth 2 times a day for 30 days. 60 Tablet 0    clonazePAM (KLONOPIN) 0.5 MG Tab Take 1 Tablet by mouth every evening for 30 days. Indications: Feeling Anxious 30 Tablet 0    doxycycline (VIBRAMYCIN) 100 MG Tab TAKE 1 TABLET BY MOUTH TWICE A DAY FOR 10 DAYS      valacyclovir (VALTREX) 1 GM Tab Take 1 Tablet by mouth 3 times a day for 7 days. 21 Tablet 0    methylPREDNISolone (MEDROL DOSEPAK) 4 MG Tablet Therapy Pack Follow schedule on package instructions. 21 Tablet 0    omeprazole (PRILOSEC) 10 MG CAPSULE DELAYED RELEASE Take 1 Capsule by mouth every day for 14 days. 14 Capsule 0    ondansetron (ZOFRAN ODT) 4 MG TABLET DISPERSIBLE Take 1 Tablet by mouth every 6 hours as needed for Nausea for up to 15 doses. (Patient not taking: Reported on 11/8/2022) 15 Tablet 0    alprazolam (XANAX) 0.5 MG Tab Take 0.5 Tablets by mouth at bedtime as needed for Anxiety. (Patient not taking: Reported on 6/2/2023)      levothyroxine (SYNTHROID) 125 MCG Tab Take 1 Tablet by mouth every morning on an empty stomach.      B Complex Vitamins (VITAMIN B COMPLEX PO) Take 1 Tablet by mouth every day.      vitamin D (CHOLECALCIFEROL) 1000 UNIT Tab Take 1,000 Units by mouth every day. (Patient not taking: Reported on 11/8/2022)      Omega-3 Fatty Acids (FISH OIL) 1000 MG Cap capsule Take 1 Capsule by mouth every day. (Patient not taking: Reported on 6/2/2023)       No current [facility]-ordered facility-administered medications on file.         ROS:   Gen: no fevers/chills, no changes in weight  Eyes: no changes in vision  ENT: no sore throat, no hearing loss, no bloody nose  Pulm: no sob, no cough  CV: no chest pain, no palpitations  GI: no nausea/vomiting, no diarrhea  : no dysuria  MSk: no myalgias  Skin: no rash  Neuro: no  "headaches, no numbness/tingling  Heme/Lymph: no easy bruising      Objective:       Exam: /82   Pulse 74   Temp 36.2 °C (97.1 °F)   Resp 16   Ht 1.575 m (5' 2\")   Wt 92.5 kg (204 lb)   SpO2 97%  Body mass index is 37.31 kg/m².    General: Normal appearing. No distress.  HEENT: Normocephalic. Eyes conjunctiva clear lids without ptosis, pupils equal and reactive to light accommodation, ears normal shape and contour, canals are clear bilaterally, tympanic membranes are benign, nasal mucosa benign, oropharynx is without erythema, edema or exudates.   Neck: Supple without JVD or bruit. Thyroid is not enlarged.  Pulmonary: Clear to ausculation.  Normal effort. No rales, ronchi, or wheezing.  Cardiovascular: Regular rate and rhythm without murmur. Carotid and radial pulses are intact and equal bilaterally.  Abdomen: Soft, nontender, nondistended. Normal bowel sounds. Liver and spleen are not palpable  Neurologic: Grossly nonfocal  Lymph: No cervical or supraclavicular lymph nodes are palpable  Skin: Warm and dry.  Healing vesicular rash on a red base along L1/L2 left side dermatome  Musculoskeletal: Normal gait. No extremity cyanosis, clubbing, or edema.  Psych: Normal mood and affect. Alert and oriented x3. Judgment and insight is normal.      Assessment & Plan:   63 y.o. female with the following -    1. Encounter to establish care  Labs per orders  Vaccinations per orders  Screenings per orders      2. Herpes zoster without complication  Acute condition, resolving  Patient has completed steroids, antiviral treatment.  She still reports some residual shingles pain.  We will trial Emla cream.  Consider trial of gabapentin if symptoms persist  - lidocaine-prilocaine (EMLA) 2.5-2.5 % Cream; Apply 1 Application topically as needed (for shingles pain) for up to 7 days.  Dispense: 30 g; Refill: 1    3. Anxiety  Chronic condition  Trial BuSpar 5 mg twice daily.  Plan to reevaluate in 1 month.  We will continue " patient's Klonopin 0.5 mg daily.  She did experience withdrawal symptoms with attempting to discontinue Xanax in the past so if plan to discontinue this medication would require very slow taper  - clonazePAM (KLONOPIN) 0.5 MG Tab; Take 1 Tablet by mouth every evening for 30 days. Indications: Feeling Anxious  Dispense: 30 Tablet; Refill: 0    4. Obesity (BMI 35.0-39.9 without comorbidity) (HCC)  Patient's weight was discussed today, including healthy low-carb diet, 30-minutes of moderate exercise daily and avoiding sugars and high-fat foods.    Discussed options for treatment of obesity including medication.  Also encourage patient to count calories of food she is consuming as she may be underestimating caloric intake.  We will also get some updated labs to rule out endocrine abnormality.  Patient may be a candidate for GLP-1 medication if she is obese and prediabetic.  A1c pending  - HEMOGLOBIN A1C; Future    5. Lipid screening  - CBC WITH DIFFERENTIAL; Future  - Comp Metabolic Panel; Future  - Lipid Profile; Future    6. Screening for thyroid disorder  - TSH WITH REFLEX TO FT4; Future    7. Encounter for screening mammogram for malignant neoplasm of breast  - MA-SCREENING MAMMO BILAT W/TOMOSYNTHESIS W/CAD; Future      I spent a total of 22 minutes with record review, exam, communication with the patient, communication with other providers, and documentation of this encounter.    Return in about 4 weeks (around 7/7/2023).    Please note that this dictation was created using voice recognition software. I have made every reasonable attempt to correct obvious errors, but I expect that there are errors of grammar and possibly content that I did not discover before finalizing the note.

## 2023-06-09 NOTE — LETTER
[facility]  Jennifer Cardoso D.O.  [address]  [facility] [address]  Fax: [phone]   Authorization for Release/Disclosure of   Protected Health Information   Name: ASHANTI FLEMING : 1960 SSN: [id number]   Address: [address] [place] [address]  [facility] [address] Phone:    [phone] (home)    I authorize the entity listed below to release/disclose the PHI below to:   [facility]/Jennifer Cardoso D.O. and Rima Zuleta P.A.-C.   Provider or Entity Name:  Dr Cardoso   [facility]   Address   City, [facility], [facility]   Phone:      Fax:     Reason for request: continuity of care   Information to be released:    [  ] LAST COLONOSCOPY,  including any PATH REPORT and follow-up  [  ] LAST FIT/COLOGUARD RESULT [  ] LAST DEXA  [  ] LAST MAMMOGRAM  [  ] LAST PAP  [  ] LAST LABS [  ] RETINA EXAM REPORT  [  ] IMMUNIZATION RECORDS  [  ] Release all info      [  ] Check here and initial the line next to each item to release ALL health information INCLUDING  _____ Care and treatment for drug and / or alcohol abuse  _____ HIV testing, infection status, or AIDS  _____ Genetic Testing    DATES OF SERVICE OR TIME PERIOD TO BE DISCLOSED: _____________  I understand and acknowledge that:  * This Authorization may be revoked at any time by you in writing, except if your health information has already been used or disclosed.  * Your health information that will be used or disclosed as a result of you signing this authorization could be re-disclosed by the recipient. If this occurs, your re-disclosed health information may no longer be protected by State or Federal laws.  * You may refuse to sign this Authorization. Your refusal will not affect your ability to obtain treatment.  * This Authorization becomes effective upon signing and will  on (date) __________.      If no date is indicated, this Authorization will  one (1) year from the signature date.    Name: Ashanti Fleming  Signature: Date:    6/9/2023     PLEASE FAX REQUESTED RECORDS BACK TO: [phone]

## 2023-07-01 ENCOUNTER — OFFICE VISIT (OUTPATIENT)
Dept: URGENT CARE | Facility: PHYSICIAN GROUP | Age: 63
End: 2023-07-01
Payer: COMMERCIAL

## 2023-07-01 VITALS
HEART RATE: 78 BPM | DIASTOLIC BLOOD PRESSURE: 94 MMHG | OXYGEN SATURATION: 98 % | BODY MASS INDEX: 37.73 KG/M2 | WEIGHT: 205 LBS | RESPIRATION RATE: 14 BRPM | SYSTOLIC BLOOD PRESSURE: 152 MMHG | HEIGHT: 62 IN | TEMPERATURE: 99.1 F

## 2023-07-01 DIAGNOSIS — H11.32 SUBCONJUNCTIVAL HEMORRHAGE OF LEFT EYE: ICD-10-CM

## 2023-07-01 DIAGNOSIS — I10 HYPERTENSION, UNSPECIFIED TYPE: ICD-10-CM

## 2023-07-01 PROCEDURE — 99213 OFFICE O/P EST LOW 20 MIN: CPT | Performed by: PHYSICIAN ASSISTANT

## 2023-07-01 PROCEDURE — 3077F SYST BP >= 140 MM HG: CPT | Performed by: PHYSICIAN ASSISTANT

## 2023-07-01 PROCEDURE — 3080F DIAST BP >= 90 MM HG: CPT | Performed by: PHYSICIAN ASSISTANT

## 2023-07-01 RX ORDER — FLUTICASONE PROPIONATE 50 MCG
SPRAY, SUSPENSION (ML) NASAL
COMMUNITY
Start: 2023-04-12 | End: 2023-12-06

## 2023-07-01 RX ORDER — MONTELUKAST SODIUM 10 MG/1
TABLET ORAL
COMMUNITY
Start: 2023-04-12 | End: 2023-12-06

## 2023-07-01 RX ORDER — HYDROCHLOROTHIAZIDE 12.5 MG/1
CAPSULE, GELATIN COATED ORAL
COMMUNITY
Start: 2023-04-12

## 2023-07-01 ASSESSMENT — ENCOUNTER SYMPTOMS
PHOTOPHOBIA: 0
ABDOMINAL PAIN: 0
BLURRED VISION: 0
EYE DISCHARGE: 0
MYALGIAS: 0
DOUBLE VISION: 0
SORE THROAT: 0
DIARRHEA: 0
COUGH: 0
EYE PAIN: 0
CONSTIPATION: 0
VOMITING: 0
SHORTNESS OF BREATH: 0
HEADACHES: 0
FEVER: 0
CHILLS: 0
EYE REDNESS: 1
NAUSEA: 0

## 2023-07-01 ASSESSMENT — FIBROSIS 4 INDEX: FIB4 SCORE: 0.89

## 2023-07-01 ASSESSMENT — VISUAL ACUITY: OU: 1

## 2023-07-01 NOTE — PROGRESS NOTES
"Subjective:   Ashanti Arvizu is a 63 y.o. female who presents for Eye Problem (L eye, poss blown blood vessels, had botox yesterday,x 10 minutes)      The pleasant 63-year-old female, history of cataract surgery, wears readers intermittently noted today when she was driving with her friend going to the mall that her left eye became bloodshot.  She has not noted any changes to visual acuities, photophobia or pain.  She does not recall any sneezing, coughing or vomiting precipitating the event.  She was mildly concerned as she had Botox done yesterday but had no complications.    Review of Systems   Constitutional:  Negative for chills and fever.   HENT:  Negative for congestion, ear pain and sore throat.    Eyes:  Positive for redness. Negative for blurred vision, double vision, photophobia, pain and discharge.   Respiratory:  Negative for cough and shortness of breath.    Cardiovascular:  Negative for chest pain.   Gastrointestinal:  Negative for abdominal pain, constipation, diarrhea, nausea and vomiting.   Genitourinary:  Negative for dysuria.   Musculoskeletal:  Negative for myalgias.   Skin:  Negative for rash.   Neurological:  Negative for headaches.       Medications, Allergies, and current problem list reviewed today in Epic.     Objective:     BP (!) 152/94   Pulse 78   Temp 37.3 °C (99.1 °F)   Resp 14   Ht 1.575 m (5' 2\")   Wt 93 kg (205 lb)   SpO2 98%     Physical Exam  Vitals reviewed.   Constitutional:       Appearance: Normal appearance.   HENT:      Head: Normocephalic and atraumatic.      Right Ear: External ear normal.      Left Ear: External ear normal.      Nose: Nose normal.      Mouth/Throat:      Mouth: Mucous membranes are moist.   Eyes:      General: Lids are normal. Lids are everted, no foreign bodies appreciated. Vision grossly intact. Gaze aligned appropriately.         Left eye: No foreign body, discharge or hordeolum.      Extraocular Movements: Extraocular movements intact.      Left " eye: Normal extraocular motion and no nystagmus.      Conjunctiva/sclera:      Left eye: Left conjunctiva is not injected. Hemorrhage present. No chemosis or exudate.     Pupils: Pupils are equal, round, and reactive to light.      Left eye: No corneal abrasion or fluorescein uptake.     Cardiovascular:      Rate and Rhythm: Normal rate.   Pulmonary:      Effort: Pulmonary effort is normal.   Skin:     General: Skin is warm and dry.      Capillary Refill: Capillary refill takes less than 2 seconds.   Neurological:      Mental Status: She is alert and oriented to person, place, and time.         Assessment/Plan:     Diagnosis and associated orders:     1. Subconjunctival hemorrhage of left eye        2. Hypertension, unspecified type           Comments/MDM:     History and physical consistent with subconjunctival hemorrhage.  No changes to visual acuities.  No defect or stye noted on eyelid margin.  Fluorescein stain did not reveal any abrasions.  Discussed the benign nature of this condition despite its alarming appearance, may try cool compresses.  Avoid itching or rubbing at the eye and follow-up as needed  Advise follow up with primary care provider to recheck bp         Differential diagnosis, natural history, supportive care, and indications for immediate follow-up discussed.    Advised the patient to follow-up with the primary care physician for recheck, reevaluation, and consideration of further management.    Please note that this dictation was created using voice recognition software. I have made a reasonable attempt to correct obvious errors, but I expect that there are errors of grammar and possibly content that I did not discover before finalizing the note.    This note was electronically signed by Blaze Pa PA-C

## 2023-07-03 ENCOUNTER — HOSPITAL ENCOUNTER (OUTPATIENT)
Dept: LAB | Facility: MEDICAL CENTER | Age: 63
End: 2023-07-03
Attending: FAMILY MEDICINE
Payer: COMMERCIAL

## 2023-07-03 DIAGNOSIS — Z13.29 SCREENING FOR THYROID DISORDER: ICD-10-CM

## 2023-07-03 DIAGNOSIS — E66.9 OBESITY (BMI 35.0-39.9 WITHOUT COMORBIDITY): ICD-10-CM

## 2023-07-03 DIAGNOSIS — Z13.220 LIPID SCREENING: ICD-10-CM

## 2023-07-03 LAB
ALBUMIN SERPL BCP-MCNC: 4.3 G/DL (ref 3.2–4.9)
ALBUMIN/GLOB SERPL: 1.4 G/DL
ALP SERPL-CCNC: 72 U/L (ref 30–99)
ALT SERPL-CCNC: 30 U/L (ref 2–50)
ANION GAP SERPL CALC-SCNC: 12 MMOL/L (ref 7–16)
AST SERPL-CCNC: 20 U/L (ref 12–45)
BASOPHILS # BLD AUTO: 1 % (ref 0–1.8)
BASOPHILS # BLD: 0.05 K/UL (ref 0–0.12)
BILIRUB SERPL-MCNC: 0.4 MG/DL (ref 0.1–1.5)
BUN SERPL-MCNC: 19 MG/DL (ref 8–22)
CALCIUM ALBUM COR SERPL-MCNC: 9.4 MG/DL (ref 8.5–10.5)
CALCIUM SERPL-MCNC: 9.6 MG/DL (ref 8.5–10.5)
CHLORIDE SERPL-SCNC: 106 MMOL/L (ref 96–112)
CHOLEST SERPL-MCNC: 236 MG/DL (ref 100–199)
CO2 SERPL-SCNC: 26 MMOL/L (ref 20–33)
CREAT SERPL-MCNC: 0.69 MG/DL (ref 0.5–1.4)
EOSINOPHIL # BLD AUTO: 0.25 K/UL (ref 0–0.51)
EOSINOPHIL NFR BLD: 4.9 % (ref 0–6.9)
ERYTHROCYTE [DISTWIDTH] IN BLOOD BY AUTOMATED COUNT: 46.3 FL (ref 35.9–50)
EST. AVERAGE GLUCOSE BLD GHB EST-MCNC: 126 MG/DL
GFR SERPLBLD CREATININE-BSD FMLA CKD-EPI: 97 ML/MIN/1.73 M 2
GLOBULIN SER CALC-MCNC: 3.1 G/DL (ref 1.9–3.5)
GLUCOSE SERPL-MCNC: 103 MG/DL (ref 65–99)
HBA1C MFR BLD: 6 % (ref 4–5.6)
HCT VFR BLD AUTO: 51.4 % (ref 37–47)
HDLC SERPL-MCNC: 51 MG/DL
HGB BLD-MCNC: 16.1 G/DL (ref 12–16)
IMM GRANULOCYTES # BLD AUTO: 0.02 K/UL (ref 0–0.11)
IMM GRANULOCYTES NFR BLD AUTO: 0.4 % (ref 0–0.9)
LDLC SERPL CALC-MCNC: 161 MG/DL
LYMPHOCYTES # BLD AUTO: 2.11 K/UL (ref 1–4.8)
LYMPHOCYTES NFR BLD: 41.5 % (ref 22–41)
MCH RBC QN AUTO: 27.8 PG (ref 27–33)
MCHC RBC AUTO-ENTMCNC: 31.3 G/DL (ref 32.2–35.5)
MCV RBC AUTO: 88.6 FL (ref 81.4–97.8)
MONOCYTES # BLD AUTO: 0.43 K/UL (ref 0–0.85)
MONOCYTES NFR BLD AUTO: 8.5 % (ref 0–13.4)
NEUTROPHILS # BLD AUTO: 2.22 K/UL (ref 1.82–7.42)
NEUTROPHILS NFR BLD: 43.7 % (ref 44–72)
NRBC # BLD AUTO: 0 K/UL
NRBC BLD-RTO: 0 /100 WBC (ref 0–0.2)
PLATELET # BLD AUTO: 252 K/UL (ref 164–446)
PMV BLD AUTO: 11 FL (ref 9–12.9)
POTASSIUM SERPL-SCNC: 4.2 MMOL/L (ref 3.6–5.5)
PROT SERPL-MCNC: 7.4 G/DL (ref 6–8.2)
RBC # BLD AUTO: 5.8 M/UL (ref 4.2–5.4)
SODIUM SERPL-SCNC: 144 MMOL/L (ref 135–145)
T4 FREE SERPL-MCNC: 1.34 NG/DL (ref 0.93–1.7)
TRIGL SERPL-MCNC: 120 MG/DL (ref 0–149)
TSH SERPL DL<=0.005 MIU/L-ACNC: 0.12 UIU/ML (ref 0.38–5.33)
WBC # BLD AUTO: 5.1 K/UL (ref 4.8–10.8)

## 2023-07-03 PROCEDURE — 84443 ASSAY THYROID STIM HORMONE: CPT

## 2023-07-03 PROCEDURE — 84439 ASSAY OF FREE THYROXINE: CPT

## 2023-07-03 PROCEDURE — 80061 LIPID PANEL: CPT

## 2023-07-03 PROCEDURE — 83036 HEMOGLOBIN GLYCOSYLATED A1C: CPT

## 2023-07-03 PROCEDURE — 85025 COMPLETE CBC W/AUTO DIFF WBC: CPT

## 2023-07-03 PROCEDURE — 80053 COMPREHEN METABOLIC PANEL: CPT

## 2023-07-03 PROCEDURE — 36415 COLL VENOUS BLD VENIPUNCTURE: CPT

## 2023-07-03 RX ORDER — BUSPIRONE HYDROCHLORIDE 5 MG/1
TABLET ORAL
Qty: 60 TABLET | Refills: 0 | Status: SHIPPED | OUTPATIENT
Start: 2023-07-03 | End: 2023-08-04

## 2023-07-03 NOTE — TELEPHONE ENCOUNTER
Received request via: Pharmacy    Was the patient seen in the last year in this department? Yes    Does the patient have an active prescription (recently filled or refills available) for medication(s) requested? No    Does the patient have [facility] and need 100 day supply (blood pressure, diabetes and cholesterol meds only)? Patient does not have SCP      BUSPIRONE HCL 5 MG TABLET

## 2023-07-10 ENCOUNTER — OFFICE VISIT (OUTPATIENT)
Dept: MEDICAL GROUP | Facility: LAB | Age: 63
End: 2023-07-10
Payer: COMMERCIAL

## 2023-07-10 VITALS
DIASTOLIC BLOOD PRESSURE: 76 MMHG | RESPIRATION RATE: 14 BRPM | WEIGHT: 207 LBS | HEART RATE: 78 BPM | OXYGEN SATURATION: 94 % | TEMPERATURE: 98.2 F | SYSTOLIC BLOOD PRESSURE: 128 MMHG | BODY MASS INDEX: 38.09 KG/M2 | HEIGHT: 62 IN

## 2023-07-10 DIAGNOSIS — E78.5 DYSLIPIDEMIA: ICD-10-CM

## 2023-07-10 DIAGNOSIS — R73.03 PREDIABETES: ICD-10-CM

## 2023-07-10 DIAGNOSIS — E89.0 POSTABLATIVE HYPOTHYROIDISM: ICD-10-CM

## 2023-07-10 DIAGNOSIS — F41.1 GAD (GENERALIZED ANXIETY DISORDER): ICD-10-CM

## 2023-07-10 PROCEDURE — 99214 OFFICE O/P EST MOD 30 MIN: CPT | Performed by: NURSE PRACTITIONER

## 2023-07-10 PROCEDURE — 3074F SYST BP LT 130 MM HG: CPT | Performed by: NURSE PRACTITIONER

## 2023-07-10 PROCEDURE — 3078F DIAST BP <80 MM HG: CPT | Performed by: NURSE PRACTITIONER

## 2023-07-10 RX ORDER — NEOMYCIN SULFATE, POLYMYXIN B SULFATE, AND DEXAMETHASONE 3.5; 10000; 1 MG/G; [USP'U]/G; MG/G
OINTMENT OPHTHALMIC
COMMUNITY
Start: 2023-04-18 | End: 2023-12-06

## 2023-07-10 RX ORDER — CLONAZEPAM 0.5 MG/1
0.5 TABLET ORAL NIGHTLY
Qty: 30 TABLET | Refills: 0 | Status: SHIPPED | OUTPATIENT
Start: 2023-07-10 | End: 2023-08-16

## 2023-07-10 ASSESSMENT — FIBROSIS 4 INDEX: FIB4 SCORE: 0.93

## 2023-07-10 NOTE — ASSESSMENT & PLAN NOTE
Chronic condition. Reviewed labs. Patient currently managing with lifestyle modifications. The 10-year ASCVD risk score (Hugo BRIDGES, et al., 2019) is: 5.7%.  Denies chest pain, shortness of breath, heart palpitations.    Lab Results   Component Value Date/Time    CHOLSTRLTOT 236 (H) 07/03/2023 09:40 AM     (H) 07/03/2023 09:40 AM    HDL 51 07/03/2023 09:40 AM    TRIGLYCERIDE 120 07/03/2023 09:40 AM

## 2023-07-10 NOTE — ASSESSMENT & PLAN NOTE
Lab work reviewed and up to date. Labs show that she is slightly overcorrected. Taking medicine as directed, but unsure her current dosage. Thinks that she is taking levothyroxine 112 mcg, but our records show that she is taking 125 mcg. Does report some palpitations, increased anxiety, heat intolerance.

## 2023-07-10 NOTE — ASSESSMENT & PLAN NOTE
This is a chronic condition. Patient is currently taking clonazepam 0.5 mg daily with relief, denies side effects. Was previously prescribed buspar, but did not like the way this medication made her feel. Would like to continue clonazepam at this time. Denies suicidal or homicidal ideation.  No complaints.

## 2023-07-11 NOTE — ASSESSMENT & PLAN NOTE
Lab Results   Component Value Date/Time    HBA1C 6.0 (H) 07/03/2023 09:40 AM    HBA1C 5.7 (H) 04/16/2021 07:29 AM   Currently working on lifestyle modifications including diet and exercise. She is interested in trying a medication to lower her a1c. Denies any unexplained weight loss, polyuria, polydipsia.

## 2023-07-11 NOTE — PROGRESS NOTES
"Subjective:     CC:   Chief Complaint   Patient presents with    Medication Refill     clonazePAM (KLONOPIN) 0.5 MG Tab ,  hydrochlorothiazide (MICROZIDE) 12.5 MG capsule      Lab Results     HPI:   Ashanti presents today with the following:    Postablative hypothyroidism  Lab work reviewed and up to date. Labs show that she is slightly overcorrected. Taking medicine as directed, but unsure her current dosage. Thinks that she is taking levothyroxine 112 mcg, but our records show that she is taking 125 mcg. Does report some palpitations, increased anxiety, heat intolerance.     ANIVAL (generalized anxiety disorder)  This is a chronic condition. Patient is currently taking clonazepam 0.5 mg daily with relief, denies side effects. Was previously prescribed buspar, but did not like the way this medication made her feel. Would like to continue clonazepam at this time. Denies suicidal or homicidal ideation.  No complaints.    Dyslipidemia  Chronic condition. Reviewed labs. Patient currently managing with lifestyle modifications. The 10-year ASCVD risk score (Hugo BRIDGES, et al., 2019) is: 5.7%.  Denies chest pain, shortness of breath, heart palpitations.    Lab Results   Component Value Date/Time    CHOLSTRLTOT 236 (H) 07/03/2023 09:40 AM     (H) 07/03/2023 09:40 AM    HDL 51 07/03/2023 09:40 AM    TRIGLYCERIDE 120 07/03/2023 09:40 AM        Prediabetes    Lab Results   Component Value Date/Time    HBA1C 6.0 (H) 07/03/2023 09:40 AM    HBA1C 5.7 (H) 04/16/2021 07:29 AM   Currently working on lifestyle modifications including diet and exercise. She is interested in trying a medication to lower her a1c. Denies any unexplained weight loss, polyuria, polydipsia.     ROS:   As documented in history of present illness above    Objective:     Exam: /76 (BP Location: Right arm, Patient Position: Sitting, BP Cuff Size: Large adult)   Pulse 78   Temp 36.8 °C (98.2 °F)   Resp 14   Ht 1.575 m (5' 2\")   Wt 93.9 kg (207 lb)   " SpO2 94%  Body mass index is 37.86 kg/m².    Constitutional: Alert, no distress, well-groomed.  Skin: Warm, dry, good turgor, no rashes in visible areas.  Eye: Equal, round and reactive, conjunctiva clear, lids normal.  ENMT: Lips without lesions, good dentition, moist mucous membranes.  Neck: Trachea midline, no masses, no thyromegaly.  Respiratory: Unlabored respiratory effort, no cough.  MSK: Normal gait, moves all extremities.  Neuro: Grossly non-focal.   Psych: Alert and oriented x3, normal affect and mood.    Assessment & Plan:     63 y.o. female with the following -     1. Postablative hypothyroidism  Chronic, uncontrolled condition. Patient will call to update her current dosage of levothyroxine.  Instructed patient to take on empty stomach with glass of water, 30 minutes prior to food or other medications.  Labs as indicated.  - TSH; Future  - FREE THYROXINE; Future    2. ANIVAL (generalized anxiety disorder)  Patient is feeling well on current medications.  Will continue.  Denies any suicidal or homicidal ideation. Discussed that should the patient have any symptoms they should call suicide prevention hotline or report to the emergency room immediately. Emphasized importance of healthy diet and exercise.    Obtained and reviewed patient utilization report from [facility] pharmacy database on 7/10/2023 prior to writing prescription for controlled substance II, III or IV per [place] bill . Based on assessment of the report, the prescription is medically necessary.   - clonazePAM (KLONOPIN) 0.5 MG Tab; Take 1 Tablet by mouth every evening for 30 days.  Dispense: 30 Tablet; Refill: 0    3. Dyslipidemia  Chronic condition. CT cardiac score ordered.  Continue lifestyle modifications.  - CT-CARDIAC SCORING; Future    4. Prediabetes  Chronic condition. Patient to take medication as prescribed. Side effects of medication prescribed today were discussed with the patient including how to take the medication and  proper dosage. Discussed repercussions of not taking the medication as prescribed. Instructed to call the office should she have any negative side effects or problems with the medication. Advised to reduce sugar/carbohydrate/alcohol, eat more vegetables and lean meats such as fish/chicken/turkey. Recommend 30 minutes of cardiovascular exercise most days of the week.   - metFORMIN (GLUCOPHAGE) 500 MG Tab; Take 1 Tablet by mouth 2 times a day with meals.  Dispense: 60 Tablet; Refill: 3

## 2023-08-01 ENCOUNTER — OFFICE VISIT (OUTPATIENT)
Dept: MEDICAL GROUP | Facility: LAB | Age: 63
End: 2023-08-01
Payer: COMMERCIAL

## 2023-08-01 VITALS
SYSTOLIC BLOOD PRESSURE: 122 MMHG | WEIGHT: 205 LBS | BODY MASS INDEX: 37.73 KG/M2 | DIASTOLIC BLOOD PRESSURE: 76 MMHG | TEMPERATURE: 97.2 F | OXYGEN SATURATION: 95 % | RESPIRATION RATE: 16 BRPM | HEART RATE: 60 BPM | HEIGHT: 62 IN

## 2023-08-01 DIAGNOSIS — E89.0 POSTABLATIVE HYPOTHYROIDISM: ICD-10-CM

## 2023-08-01 DIAGNOSIS — R73.03 PREDIABETES: ICD-10-CM

## 2023-08-01 DIAGNOSIS — B02.29 POST HERPETIC NEURALGIA: ICD-10-CM

## 2023-08-01 DIAGNOSIS — E78.5 DYSLIPIDEMIA: ICD-10-CM

## 2023-08-01 DIAGNOSIS — F41.1 GAD (GENERALIZED ANXIETY DISORDER): ICD-10-CM

## 2023-08-01 PROCEDURE — 99214 OFFICE O/P EST MOD 30 MIN: CPT | Performed by: PHYSICIAN ASSISTANT

## 2023-08-01 PROCEDURE — 3074F SYST BP LT 130 MM HG: CPT | Performed by: PHYSICIAN ASSISTANT

## 2023-08-01 PROCEDURE — 3078F DIAST BP <80 MM HG: CPT | Performed by: PHYSICIAN ASSISTANT

## 2023-08-01 RX ORDER — DULOXETIN HYDROCHLORIDE 20 MG/1
20 CAPSULE, DELAYED RELEASE ORAL DAILY
Qty: 90 CAPSULE | Refills: 0 | Status: SHIPPED | OUTPATIENT
Start: 2023-08-01 | End: 2023-11-03

## 2023-08-01 RX ORDER — LIDOCAINE AND PRILOCAINE 25; 25 MG/G; MG/G
1 CREAM TOPICAL 2 TIMES DAILY PRN
Qty: 60 G | Refills: 0 | Status: SHIPPED | OUTPATIENT
Start: 2023-08-01 | End: 2023-08-31

## 2023-08-01 ASSESSMENT — FIBROSIS 4 INDEX: FIB4 SCORE: 0.93

## 2023-08-01 NOTE — PROGRESS NOTES
Subjective:     CC: 1 month follow-up    HPI:   Ashanti here today with     Anxiety  Did not tolerate buspar well - felt tired/numb with this medication. Previously tried Xanax, Paxil, Lexapro, Trazadone (weight gain).  Still taking clonazepam at night.     Postablative hypothyroidism  At last visit dated 07/10/2023 showed slightly overcorrected.  It appears there was some uncertainty regarding her medication dosage. Pt is currently taking 112mcg QD. She takes this regularly as prescribed.  Plan to recheck TSH in 6 to 8 weeks.    Dyslipidemia  LDL elevated at 161, ASCVD score 5.7%  Cardiac calcium scoring ordered at previous appointment    Prediabetes  New finding on recent labs.  Prescribed metformin at previous appointment as well as counseled on diet/exercise.  Pt has not started this yet, she would like to discuss medication side effects she would like to discuss medication side effects.    Shingles/postherpetic neuralgia  Patient's shingles rash has healed although she was still reports some burning/tingling pain at the site of the rash    ROS:  Gen: no fevers/chills, no changes in weight  Eyes: no changes in vision  ENT: no sore throat, no hearing loss, no bloody nose  Pulm: no sob, no cough  CV: no chest pain, no palpitations  GI: no nausea/vomiting, no diarrhea  : no dysuria  MSk: no myalgias  Skin: no rash  Neuro: no headaches, no numbness/tingling  Heme/Lymph: no easy bruising    Current Outpatient Medications Ordered in Epic   Medication Sig Dispense Refill    neomycin-polymixin-dexamethasone (MAXITROL) 3.5-26898-3.1 Ointment ophthalmic ointment APPLY 0.5 INCH (ONE-HALF INCH) STRIP TO BOTH EYES AT BEDTIME AND AS NEEDED      metFORMIN (GLUCOPHAGE) 500 MG Tab Take 1 Tablet by mouth 2 times a day with meals. 60 Tablet 3    clonazePAM (KLONOPIN) 0.5 MG Tab Take 1 Tablet by mouth every evening for 30 days. 30 Tablet 0    busPIRone (BUSPAR) 5 MG tablet TAKE 1 TABLET BY MOUTH 2 TIMES A DAY 60 Tablet 0     "fluticasone (FLONASE) 50 MCG/ACT nasal spray SPRAY 1 SPRAY INTO EACH NOSTRIL EVERY DAY FOR 90 DAYS      hydrochlorothiazide (MICROZIDE) 12.5 MG capsule TAKE 1 CAPSULE BY MOUTH EVERY DAY IN THE MORNING FOR 90 DAYS      montelukast (SINGULAIR) 10 MG Tab TAKE 1 TABLET BY MOUTH EVERY DAY FOR 90 DAYS      levothyroxine (SYNTHROID) 125 MCG Tab Take 1 Tablet by mouth every morning on an empty stomach.      B Complex Vitamins (VITAMIN B COMPLEX PO) Take 1 Tablet by mouth every day.       No current [facility]-ordered facility-administered medications on file.         Objective:     Exam:  /76   Pulse 60   Temp 36.2 °C (97.2 °F)   Resp 16   Ht 1.575 m (5' 2\")   Wt 93 kg (205 lb)   SpO2 95%   BMI 37.49 kg/m²  Body mass index is 37.49 kg/m².    Constitutional: Alert, no distress, well-groomed.  Skin: Warm, dry, good turgor, no rashes in visible areas.  Eye: Equal, round and reactive, conjunctiva clear, lids normal.  ENMT: Lips without lesions, good dentition, moist mucous membranes.  Neck: Trachea midline, no masses, no thyromegaly.  Respiratory: Unlabored respiratory effort, no cough.  MSK: Normal gait, moves all extremities.  Neuro: Grossly non-focal.   Psych: Alert and oriented x3, normal affect and mood.        Assessment & Plan:     63 y.o. female with the following -     1. ANIVAL (generalized anxiety disorder)  Chronic condition  Patient did not tolerate BuSpar.  She has tried several other SSRIs but they are either not effective patient was unable to tolerate side effects.  Given her postherpetic neuralgia and ongoing anxiety symptoms she may be a candidate for SNRI.  We will trial duloxetine 20 mg daily.  Follow-up in 1 month reassess  - DULoxetine (CYMBALTA) 20 MG Cap DR Particles; Take 1 Capsule by mouth every day.  Dispense: 90 Capsule; Refill: 0    2. Postablative hypothyroidism  Chronic condition  Continue current dose of Synthroid 112 mcg.  We will recheck TSH in 6 to 8 weeks.  If TSH continues to be " abnormal at that point consider changing medication dosage    3. Dyslipidemia  Reviewed lab results with patient.  Patient questions addressed.  We will proceed with cardiac calcium scoring for further risk stratification    4. Post herpetic neuralgia  - lidocaine-prilocaine (EMLA) 2.5-2.5 % Cream; Apply 1 Application topically 2 times a day as needed (shingles pain) for up to 30 days.  Dispense: 60 g; Refill: 0  - DULoxetine (CYMBALTA) 20 MG Cap DR Particles; Take 1 Capsule by mouth every day.  Dispense: 90 Capsule; Refill: 0    5. Prediabetes  New findings  Discussed risk and benefits of metformin with patient.  She would like to proceed with medication.  Also discussed importance of monitoring carbohydrate intake as well as adequate physical activity      I spent a total of 25 minutes with record review, exam, communication with the patient, communication with other providers, and documentation of this encounter.      No follow-ups on file.    Please note that this dictation was created using voice recognition software. I have made every reasonable attempt to correct obvious errors, but there may be errors of grammar and possibly content that I did not discover before finalizing the note.

## 2023-08-04 RX ORDER — BUSPIRONE HYDROCHLORIDE 5 MG/1
5 TABLET ORAL 2 TIMES DAILY
Qty: 60 TABLET | Refills: 0 | Status: SHIPPED | OUTPATIENT
Start: 2023-08-04 | End: 2023-08-18

## 2023-08-04 NOTE — TELEPHONE ENCOUNTER
Received request via: Pharmacy  8/1//23  Was the patient seen in the last year in this department? Yes    Does the patient have an active prescription (recently filled or refills available) for medication(s) requested? No    Does the patient have [facility] Plus and need 100 day supply (blood pressure, diabetes and cholesterol meds only)? Patient does not have SCP

## 2023-08-15 ENCOUNTER — HOSPITAL ENCOUNTER (OUTPATIENT)
Dept: RADIOLOGY | Facility: MEDICAL CENTER | Age: 63
End: 2023-08-15
Attending: NURSE PRACTITIONER
Payer: COMMERCIAL

## 2023-08-15 DIAGNOSIS — E78.5 DYSLIPIDEMIA: ICD-10-CM

## 2023-08-15 PROCEDURE — 4410556 CT-CARDIAC SCORING (SELF PAY ONLY)

## 2023-08-16 DIAGNOSIS — F41.1 GAD (GENERALIZED ANXIETY DISORDER): ICD-10-CM

## 2023-08-16 RX ORDER — CLONAZEPAM 0.5 MG/1
0.5 TABLET ORAL EVERY EVENING
Qty: 30 TABLET | Refills: 0 | Status: SHIPPED | OUTPATIENT
Start: 2023-08-16 | End: 2023-08-16 | Stop reason: SDUPTHER

## 2023-08-16 RX ORDER — CLONAZEPAM 0.5 MG/1
0.5 TABLET ORAL EVERY EVENING
Qty: 30 TABLET | Refills: 0 | Status: SHIPPED | OUTPATIENT
Start: 2023-08-16 | End: 2023-09-13

## 2023-08-18 RX ORDER — BUSPIRONE HYDROCHLORIDE 5 MG/1
5 TABLET ORAL 2 TIMES DAILY
Qty: 180 TABLET | Refills: 1 | Status: SHIPPED | OUTPATIENT
Start: 2023-08-18 | End: 2023-12-06

## 2023-08-18 NOTE — TELEPHONE ENCOUNTER
Received request via: Pharmacy    Was the patient seen in the last year in this department? Yes  LOV: 8/1/2023  Does the patient have an active prescription (recently filled or refills available) for medication(s) requested? No    Does the patient have [facility] and need 100 day supply (blood pressure, diabetes and cholesterol meds only)? Patient does not have SCP

## 2023-09-12 DIAGNOSIS — F41.1 GAD (GENERALIZED ANXIETY DISORDER): ICD-10-CM

## 2023-09-13 RX ORDER — CLONAZEPAM 0.5 MG/1
0.5 TABLET ORAL EVERY EVENING
Qty: 30 TABLET | Refills: 0 | Status: SHIPPED | OUTPATIENT
Start: 2023-09-13 | End: 2023-10-10

## 2023-11-10 DIAGNOSIS — F41.1 GAD (GENERALIZED ANXIETY DISORDER): ICD-10-CM

## 2023-11-11 NOTE — TELEPHONE ENCOUNTER
Received request via: Pharmacy    Was the patient seen in the last year in this department? Yes 8/1/23    Does the patient have an active prescription (recently filled or refills available) for medication(s) requested? No    Does the patient have [facility] and need 100 day supply (blood pressure, diabetes and cholesterol meds only)? Patient does not have SCP

## 2023-11-14 RX ORDER — CLONAZEPAM 0.5 MG/1
0.5 TABLET ORAL EVERY EVENING
Qty: 30 TABLET | Refills: 0 | Status: SHIPPED | OUTPATIENT
Start: 2023-11-14 | End: 2023-12-12

## 2023-12-06 ENCOUNTER — OFFICE VISIT (OUTPATIENT)
Dept: URGENT CARE | Facility: CLINIC | Age: 63
End: 2023-12-06
Payer: COMMERCIAL

## 2023-12-06 VITALS
TEMPERATURE: 97.9 F | BODY MASS INDEX: 36.25 KG/M2 | HEIGHT: 62 IN | RESPIRATION RATE: 14 BRPM | WEIGHT: 197 LBS | SYSTOLIC BLOOD PRESSURE: 126 MMHG | HEART RATE: 78 BPM | OXYGEN SATURATION: 96 % | DIASTOLIC BLOOD PRESSURE: 80 MMHG

## 2023-12-06 DIAGNOSIS — H66.001 ACUTE SUPPURATIVE OTITIS MEDIA OF RIGHT EAR WITHOUT SPONTANEOUS RUPTURE OF TYMPANIC MEMBRANE, RECURRENCE NOT SPECIFIED: Primary | ICD-10-CM

## 2023-12-06 DIAGNOSIS — J06.9 UPPER RESPIRATORY INFECTION, ACUTE: ICD-10-CM

## 2023-12-06 DIAGNOSIS — R05.1 ACUTE COUGH: ICD-10-CM

## 2023-12-06 PROCEDURE — 99214 OFFICE O/P EST MOD 30 MIN: CPT | Performed by: NURSE PRACTITIONER

## 2023-12-06 PROCEDURE — 3074F SYST BP LT 130 MM HG: CPT | Performed by: NURSE PRACTITIONER

## 2023-12-06 PROCEDURE — 3079F DIAST BP 80-89 MM HG: CPT | Performed by: NURSE PRACTITIONER

## 2023-12-06 RX ORDER — AMOXICILLIN AND CLAVULANATE POTASSIUM 875; 125 MG/1; MG/1
1 TABLET, FILM COATED ORAL 2 TIMES DAILY
Qty: 20 TABLET | Refills: 0 | Status: SHIPPED | OUTPATIENT
Start: 2023-12-06 | End: 2023-12-11

## 2023-12-06 RX ORDER — BENZONATATE 100 MG/1
100 CAPSULE ORAL 3 TIMES DAILY PRN
Qty: 60 CAPSULE | Refills: 0 | Status: SHIPPED | OUTPATIENT
Start: 2023-12-06

## 2023-12-06 ASSESSMENT — FIBROSIS 4 INDEX: FIB4 SCORE: 0.93

## 2023-12-06 NOTE — PROGRESS NOTES
Ashanti Arvizu is a 63 y.o. female who presents for Ear Pain (X 7 days, RT ear pain, slight sore throat, nasal congestion, cough with chest pressure.)      HPI  This is a new problem. Ashanti Arvizu is a 63 y.o. patient who presents to urgent care with c/o: ear pain for 7 days. Slowly getting worse. Pain is persistent. Assoc Sx: cough,bodyaches,  nasal congestion and pain in her chest that is sore when she pushes or moves.   Treatment tried: advil and nyquil/ dayquil. Denies sob, c/p, diaphoresis, weakness, NVD.      ROS See HPI    Allergies:       Allergies   Allergen Reactions    Sulfa Drugs Swelling     Throat and rash    Sulfamethoxazole W-Trimethoprim      Other reaction(s): LIP SWELLING    Sulfamethoxazole-Trimethoprim Hives    Lasix [Furosemide]     Tape Swelling       PMSFS Hx:  Past Medical History:   Diagnosis Date    Disorder of thyroid     Heart burn     Psychiatric problem     anxiety     Past Surgical History:   Procedure Laterality Date    PTOSIS REPAIR Left 2/1/2017    Procedure: PTOSIS REPAIR - UPPER (POSTERIOR APPROACH);  Surgeon: Stu Malik M.D.;  Location: SURGERY SURGICAL [facility];  Service:     IMTIAZ BY LAPAROSCOPY  2014    removal lap band    GASTRIC BANDING LAPAROSCOPIC  2011    TONSILLECTOMY  1961    ABDOMINAL HYSTERECTOMY TOTAL      APPENDECTOMY CHILD      EYE SURGERY      HYSTERECTOMY, TOTAL ABDOMINAL      MAMMOPLASTY AUGMENTATION       Family History   Problem Relation Age of Onset    No Known Problems Mother     Diabetes Father     No Known Problems Sister     No Known Problems Sister     Heart Disease Brother     No Known Problems Brother     No Known Problems Brother     Heart Disease Maternal Grandmother     Thyroid Maternal Grandmother     Diabetes Paternal Grandmother     Cancer Paternal Grandfather         prostate    Diabetes Paternal Grandfather     No Known Problems Daughter     No Known Problems Daughter     No Known Problems Son      Social History     Tobacco Use    Smoking  "status: Former     Types: Cigarettes    Smokeless tobacco: Never   Substance Use Topics    Alcohol use: Not Currently     Alcohol/week: 0.0 oz     Comment: Occasionally       Problems:   Patient Active Problem List   Diagnosis    TIA (transient ischemic attack)    Insomnia    Postablative hypothyroidism    Obesity (BMI 35.0-39.9 without comorbidity) (HCC)    Recurrent UTI    ANIVAL (generalized anxiety disorder)    Dyslipidemia    Prediabetes       Medications:   Current Outpatient Medications on File Prior to Visit   Medication Sig Dispense Refill    clonazePAM (KLONOPIN) 0.5 MG Tab Take 1 Tablet by mouth every evening for 30 days. Indications: Feeling Anxious 30 Tablet 0    DULoxetine (CYMBALTA) 20 MG Cap DR Particles TAKE 1 CAPSULE BY MOUTH EVERY DAY 90 Capsule 3    metFORMIN (GLUCOPHAGE) 500 MG Tab Take 1 Tablet by mouth 2 times a day with meals. 60 Tablet 3    hydrochlorothiazide (MICROZIDE) 12.5 MG capsule TAKE 1 CAPSULE BY MOUTH EVERY DAY IN THE MORNING FOR 90 DAYS      levothyroxine (SYNTHROID) 125 MCG Tab Take 1 Tablet by mouth every morning on an empty stomach.      B Complex Vitamins (VITAMIN B COMPLEX PO) Take 1 Tablet by mouth every day.       No current facility-administered medications on file prior to visit.        Objective:     /80   Pulse 78   Temp 36.6 °C (97.9 °F) (Temporal)   Resp 14   Ht 1.575 m (5' 2\")   Wt 89.4 kg (197 lb)   SpO2 96%   BMI 36.03 kg/m²     Physical Exam  Vitals and nursing note reviewed.   Constitutional:       General: She is not in acute distress.     Appearance: Normal appearance. She is well-developed. She is not ill-appearing or toxic-appearing.   HENT:      Head: Normocephalic.      Right Ear: Hearing normal. No middle ear effusion. Tympanic membrane is injected. Tympanic membrane is not erythematous.      Left Ear: Hearing normal. A middle ear effusion is present. Tympanic membrane is injected, erythematous and bulging.      Nose: Congestion and rhinorrhea " present. No mucosal edema.      Right Sinus: No maxillary sinus tenderness or frontal sinus tenderness.      Left Sinus: No maxillary sinus tenderness or frontal sinus tenderness.      Mouth/Throat:      Lips: Pink.      Mouth: Mucous membranes are moist.      Pharynx: Oropharynx is clear. Uvula midline. No posterior oropharyngeal erythema.      Tonsils: No tonsillar abscesses.   Neck:      Trachea: Trachea normal.   Cardiovascular:      Rate and Rhythm: Normal rate and regular rhythm.      Chest Wall: PMI is not displaced.      Pulses: Normal pulses.      Heart sounds: Normal heart sounds.   Pulmonary:      Effort: Pulmonary effort is normal. No respiratory distress.      Breath sounds: Normal breath sounds. No decreased breath sounds, wheezing, rhonchi or rales.   Musculoskeletal:         General: Normal range of motion.      Cervical back: Full passive range of motion without pain, normal range of motion and neck supple.   Lymphadenopathy:      Cervical: No cervical adenopathy.      Upper Body:      Right upper body: No supraclavicular adenopathy.      Left upper body: No supraclavicular adenopathy.   Skin:     General: Skin is warm and dry.      Capillary Refill: Capillary refill takes less than 2 seconds.   Neurological:      Mental Status: She is alert and oriented to person, place, and time.      Gait: Gait normal.   Psychiatric:         Mood and Affect: Mood normal.         Speech: Speech normal.         Behavior: Behavior normal. Behavior is cooperative.         Thought Content: Thought content normal.         Judgment: Judgment normal.         Assessment /Associated Orders:      1. Acute suppurative otitis media of right ear without spontaneous rupture of tympanic membrane, recurrence not specified  amoxicillin-clavulanate (AUGMENTIN) 875-125 MG Tab      2. Upper respiratory infection, acute        3. Acute cough  benzonatate (TESSALON) 100 MG Cap            Medical Decision Making:    Ashanti is a very  pleasant 63 y.o. female who is clinically stable at today's acute urgent care visit.  No acute distress noted.  VSS. Appropriate for outpatient care at this time.   Acute problem today with uncertain prognosis.   Educated in proper administration of  prescription medication(s) ordered today including safety, possible SE, risks, benefits, rationale and alternatives to therapy.   Keep well hydated    Discussed Dx, management options (risks,benefits, and alternatives to planned treatment), natural progression and supportive care.  Expressed understanding and the treatment plan was agreed upon.   Questions were encouraged and answered   Return to urgent care prn if new or worsening sx or if there is no improvement in condition prn.    Educated in Red flags and indications to immediately call 911 or present to the Emergency Department.       Time I spent evaluating Ashanti Arvizu in urgent care today was 31  minutes. This time includes preparing for visit, reviewing any pertinent notes or test results, counseling/education, exam, obtaining HPI, interpretation of lab tests, medication management and documentation as indicated above.Time does not include separately billable procedures noted .       Please note that this dictation was created using voice recognition software. I have worked with consultants from the vendor as well as technical experts from [facility] at the [facility] to optimize the interface. I have made every reasonable attempt to correct obvious errors, but I expect that there are errors of grammar and possibly content that I did not discover before finalizing the note.  This note was electronically signed by provider

## 2023-12-06 NOTE — LETTER
December 6, 2023       Patient: Ashanti Arvizu   YOB: 1960   Date of Visit: 12/6/2023         To Whom It May Concern:    In my medical opinion, I recommend that Ashanti Arvizu return to full duty, no restrictions on 12/08/23              Sincerely,          KOFFI LiangPDARCY.  Electronically Signed

## 2023-12-11 ENCOUNTER — OFFICE VISIT (OUTPATIENT)
Dept: URGENT CARE | Facility: CLINIC | Age: 63
End: 2023-12-11
Payer: COMMERCIAL

## 2023-12-11 VITALS
BODY MASS INDEX: 36.25 KG/M2 | SYSTOLIC BLOOD PRESSURE: 122 MMHG | DIASTOLIC BLOOD PRESSURE: 70 MMHG | WEIGHT: 197 LBS | TEMPERATURE: 98.4 F | OXYGEN SATURATION: 95 % | RESPIRATION RATE: 18 BRPM | HEART RATE: 68 BPM | HEIGHT: 62 IN

## 2023-12-11 DIAGNOSIS — J02.9 PHARYNGITIS, UNSPECIFIED ETIOLOGY: ICD-10-CM

## 2023-12-11 DIAGNOSIS — J01.90 ACUTE BACTERIAL SINUSITIS: Primary | ICD-10-CM

## 2023-12-11 DIAGNOSIS — F41.1 GAD (GENERALIZED ANXIETY DISORDER): ICD-10-CM

## 2023-12-11 DIAGNOSIS — B96.89 ACUTE BACTERIAL SINUSITIS: Primary | ICD-10-CM

## 2023-12-11 DIAGNOSIS — R59.0 CERVICAL LYMPHADENOPATHY: ICD-10-CM

## 2023-12-11 LAB
HETEROPH AB SER QL LA: NEGATIVE
POCT INT CON NEG: NEGATIVE
POCT INT CON POS: POSITIVE

## 2023-12-11 PROCEDURE — 3078F DIAST BP <80 MM HG: CPT

## 2023-12-11 PROCEDURE — 3074F SYST BP LT 130 MM HG: CPT

## 2023-12-11 PROCEDURE — 99213 OFFICE O/P EST LOW 20 MIN: CPT

## 2023-12-11 PROCEDURE — 86308 HETEROPHILE ANTIBODY SCREEN: CPT

## 2023-12-11 RX ORDER — IBUPROFEN 600 MG/1
600 TABLET ORAL EVERY 6 HOURS PRN
COMMUNITY
Start: 2023-10-24

## 2023-12-11 RX ORDER — DICYCLOMINE HYDROCHLORIDE 10 MG/1
10 CAPSULE ORAL
COMMUNITY

## 2023-12-11 RX ORDER — HYDROCODONE BITARTRATE AND ACETAMINOPHEN 5; 325 MG/1; MG/1
1 TABLET ORAL EVERY 6 HOURS PRN
COMMUNITY
Start: 2023-10-24

## 2023-12-11 RX ORDER — PREDNISONE 10 MG/1
40 TABLET ORAL DAILY
Qty: 20 TABLET | Refills: 0 | Status: SHIPPED | OUTPATIENT
Start: 2023-12-11 | End: 2023-12-16

## 2023-12-11 RX ORDER — DOXYCYCLINE HYCLATE 100 MG
100 TABLET ORAL 2 TIMES DAILY
Qty: 14 TABLET | Refills: 0 | Status: SHIPPED | OUTPATIENT
Start: 2023-12-11 | End: 2023-12-18

## 2023-12-11 ASSESSMENT — ENCOUNTER SYMPTOMS
SORE THROAT: 1
ABDOMINAL PAIN: 0
HEADACHES: 0
CHILLS: 1
STRIDOR: 0
MYALGIAS: 0
PALPITATIONS: 0
DIZZINESS: 0
SHORTNESS OF BREATH: 0
FEVER: 0
SINUS PAIN: 1
BLURRED VISION: 0
DOUBLE VISION: 0
NECK PAIN: 0
SPUTUM PRODUCTION: 0
NAUSEA: 0
COUGH: 1
WHEEZING: 0
VOMITING: 0

## 2023-12-11 ASSESSMENT — FIBROSIS 4 INDEX: FIB4 SCORE: 0.93

## 2023-12-11 NOTE — PROGRESS NOTES
Subjective:   Ashanti Arvizu is a 63 y.o. female who presents for Other (Pt states she was here on 12/6/23 for same issue, states was giving antibiotics, states the medication made her vomit, states she did not finish medication, states that when she stopped taking medication did no longer vomit, would like to see If she still has ear infection states she still does not feel well )          I introduced myself to the patient and informed them that I am a family nurse practitioner.    HPI:Ashanti comes in today c/o hedache, sinus pain, thick green drainage, R ear pain, PND, cough.. Onset was states she has been sick now for over 2 weeks. Was seen in  on 12/06/2023, dx with otitis media R and given Augmentin. She states she took 5 days of this, made her vomit every time she took it so stopped taking it. Ear pain came back, sinus symptoms have worsened.  Patient describes symptoms as constant. They describe the pain as aching, pressure. Aggravating factors include bending forward, lying down flat. Relieving factors include none. Treatments tried at home include none. They describe their symptoms as moderate to severe. She states she did a home Covid test 2 days ago that was negative.       Review of Systems   Constitutional:  Positive for chills and malaise/fatigue. Negative for fever.   HENT:  Positive for congestion, sinus pain and sore throat. Negative for ear discharge.    Eyes:  Negative for blurred vision and double vision.   Respiratory:  Positive for cough. Negative for sputum production, shortness of breath, wheezing and stridor.    Cardiovascular:  Negative for chest pain and palpitations.   Gastrointestinal:  Negative for abdominal pain, nausea and vomiting.   Genitourinary:  Negative for dysuria.   Musculoskeletal:  Negative for myalgias and neck pain.   Skin:  Negative for rash.   Neurological:  Negative for dizziness and headaches.       Medications: amoxicillin-clavulanate Tabs  benzonatate Caps  clonazePAM  "Tabs  dicyclomine Caps  DULoxetine Cpep  hydrochlorothiazide  HYDROcodone-acetaminophen Tabs  ibuprofen Tabs  levothyroxine Tabs  metFORMIN Tabs  VITAMIN B COMPLEX PO    Allergies: Sulfa drugs, Sulfamethoxazole w-trimethoprim, Sulfamethoxazole-trimethoprim, Lasix [furosemide], and Tape    Problem List: does not have any pertinent problems on file.    Surgical History:  Past Surgical History:   Procedure Laterality Date    PTOSIS REPAIR Left 2/1/2017    Procedure: PTOSIS REPAIR - UPPER (POSTERIOR APPROACH);  Surgeon: Stu Malik M.D.;  Location: SURGERY SURGICAL [facility];  Service:     IMTIAZ BY LAPAROSCOPY  2014    removal lap band    GASTRIC BANDING LAPAROSCOPIC  2011    TONSILLECTOMY  1961    ABDOMINAL HYSTERECTOMY TOTAL      APPENDECTOMY CHILD      EYE SURGERY      HYSTERECTOMY, TOTAL ABDOMINAL      MAMMOPLASTY AUGMENTATION         Past Social Hx:   reports that she has quit smoking. Her smoking use included cigarettes. She has never used smokeless tobacco. She reports that she does not currently use alcohol. She reports that she does not currently use drugs after having used the following drugs: Marijuana and Oral.     Past Family Hx:   family history includes Cancer in her paternal grandfather; Diabetes in her father, paternal grandfather, and paternal grandmother; Heart Disease in her brother and maternal grandmother; No Known Problems in her brother, brother, daughter, daughter, mother, sister, sister, and son; Thyroid in her maternal grandmother.     Problem list, medications, and allergies reviewed by myself today in Epic.   I have documented what I find to be significant in regards to past medical, social, family and surgical history  in my HPI or under PMH/PSH/FH review section, otherwise it is noncontributory     Objective:     /70   Pulse 68   Temp 36.9 °C (98.4 °F) (Temporal)   Resp 18   Ht 1.575 m (5' 2\")   Wt 89.4 kg (197 lb)   SpO2 95%   BMI 36.03 kg/m²     During this visit, " appropriate PPE was worn, and hand hygiene was performed.    Physical Exam  Vitals reviewed.   Constitutional:       General: She is not in acute distress.     Appearance: Normal appearance. She is not ill-appearing or toxic-appearing.   HENT:      Head: Normocephalic and atraumatic.      Right Ear: Tympanic membrane, ear canal and external ear normal. There is no impacted cerumen.      Left Ear: Tympanic membrane, ear canal and external ear normal. There is no impacted cerumen.      Nose: Congestion and rhinorrhea present.      Right Sinus: Maxillary sinus tenderness and frontal sinus tenderness present.      Left Sinus: Maxillary sinus tenderness and frontal sinus tenderness present.      Mouth/Throat:      Pharynx: Uvula midline. No oropharyngeal exudate, posterior oropharyngeal erythema or uvula swelling.      Tonsils: No tonsillar exudate or tonsillar abscesses. 0 on the right. 0 on the left.   Eyes:      General:         Right eye: No discharge.         Left eye: No discharge.      Conjunctiva/sclera: Conjunctivae normal.      Pupils: Pupils are equal, round, and reactive to light.   Cardiovascular:      Rate and Rhythm: Normal rate and regular rhythm.      Heart sounds: Normal heart sounds. No murmur heard.     No friction rub. No gallop.   Pulmonary:      Effort: No respiratory distress.      Breath sounds: Normal breath sounds. No wheezing, rhonchi or rales.   Abdominal:      General: There is no distension.   Musculoskeletal:         General: Normal range of motion.      Cervical back: Normal range of motion. No rigidity.      Right lower leg: No edema.      Left lower leg: No edema.   Lymphadenopathy:      Cervical: No cervical adenopathy.   Skin:     General: Skin is warm and dry.      Coloration: Skin is not jaundiced.   Neurological:      Mental Status: She is alert and oriented to person, place, and time. Mental status is at baseline.   Psychiatric:         Mood and Affect: Mood normal.          Behavior: Behavior normal.       Lab Results/POC Test Results   Results for orders placed or performed in visit on 12/11/23   POCT Mononucleosis (mono)   Result Value Ref Range    Heterophile Screen Negative Negative, Invalid    Internal Control Positive Positive     Internal Control Negative Negative              Assessment/Plan:     Diagnosis and associated orders:     1. Acute bacterial sinusitis  doxycycline (VIBRAMYCIN) 100 MG Tab    predniSONE (DELTASONE) 10 MG Tab      2. Pharyngitis, unspecified etiology  POCT Mononucleosis (mono)      3. Cervical lymphadenopathy  POCT Mononucleosis (mono)         Comments/MDM:     1. Pharyngitis, unspecified etiology  Discussed with patient that the pharyngitis she is experiencing is likely due to PND as a result of her sinus infection.  - POCT Mononucleosis (mono)    2. Cervical lymphadenopathy  I did discuss with patient that the monotest was negative.  - POCT Mononucleosis (mono)    3. Acute bacterial sinusitis  We discussed management of sinus infection including -  Instructed patient regarding medications prescribed, doxycycline, prednisone, purpose, side effects, precautions.  Instructed her to take the entire course of both medications.  - supportive care measures such as drinking plenty of fluids, use a humidifier in room at night to keep mucous membranes moist, applying warm compresses to face and forehead may be useful in relieving sinus pain and pressure, using a sinus wash such as the [employer]i bottle several times a day as needed, Flonase daily, OTC second-generation non-sedating antihistamine such as Zyrtec, Allegra, or Loratadine daily, alternate Tylenol with ibuprofen (unless contraindicated) for pain and fever.    -We did discuss red flags and reasons to return to urgent care versus when to go to the ER.  Patient states that they understand all instructions are agreeable with the plan of care.    - doxycycline (VIBRAMYCIN) 100 MG Tab; Take 1 Tablet by  mouth 2 times a day for 7 days.  Dispense: 14 Tablet; Refill: 0  - predniSONE (DELTASONE) 10 MG Tab; Take 4 Tablets by mouth every day for 5 days.  Dispense: 20 Tablet; Refill: 0         Pt is clinically stable at today's acute urgent care visit. Vital signs are normal and reassuring.  No acute distress noted. Appropriate for outpatient management at this time.       Discussed DDx, management options (risks,benefits, and alternatives to planned treatment), natural progression and supportive care.  Patient states they have good understanding and the treatment plan was agreed upon. Questions were encouraged and answered   Return to urgent care prn if new or worsening sx or if there is no improvement in condition prn.    Advised the patient to follow-up with the primary care physician for recheck, reevaluation, and consideration of further management.  I instructed patient to get a pharmacy consult when picking up any prescribed medications.  Strict ER precautions discussed for any  chest pain, difficulty breathing, difficulty swallowing, wheezing, stridor, or drooling, fever that does not respond to ibuprofen and Tylenol, inability to tolerate fluids, dehydration, lethargy.   Patient states they understand all instructions.     I personally reviewed prior external notes and test results pertinent to today's visit.  I have independently reviewed and interpreted all diagnostics ordered during this urgent care acute visit.        Please note that this dictation was created using voice recognition software. I have made a reasonable attempt to correct obvious errors, but I expect that there are errors of grammar and possibly content that I did not discover before finalizing the note.    This note was electronically signed by Sandip FLANNERY, BRICE, NUZHAT, STEFAN

## 2023-12-12 RX ORDER — CLONAZEPAM 0.5 MG/1
0.5 TABLET ORAL EVERY EVENING
Qty: 30 TABLET | Refills: 0 | Status: SHIPPED | OUTPATIENT
Start: 2023-12-12 | End: 2024-01-10

## 2023-12-23 DIAGNOSIS — R73.03 PREDIABETES: ICD-10-CM

## 2024-01-03 ENCOUNTER — APPOINTMENT (OUTPATIENT)
Dept: RADIOLOGY | Facility: IMAGING CENTER | Age: 64
End: 2024-01-03
Attending: REGISTERED NURSE
Payer: COMMERCIAL

## 2024-01-03 ENCOUNTER — OFFICE VISIT (OUTPATIENT)
Dept: URGENT CARE | Facility: CLINIC | Age: 64
End: 2024-01-03
Payer: COMMERCIAL

## 2024-01-03 VITALS
OXYGEN SATURATION: 93 % | DIASTOLIC BLOOD PRESSURE: 86 MMHG | HEIGHT: 62 IN | WEIGHT: 202 LBS | TEMPERATURE: 97.8 F | SYSTOLIC BLOOD PRESSURE: 134 MMHG | RESPIRATION RATE: 18 BRPM | BODY MASS INDEX: 37.17 KG/M2 | HEART RATE: 80 BPM

## 2024-01-03 DIAGNOSIS — R05.1 ACUTE COUGH: ICD-10-CM

## 2024-01-03 PROCEDURE — 99213 OFFICE O/P EST LOW 20 MIN: CPT | Performed by: REGISTERED NURSE

## 2024-01-03 PROCEDURE — 3079F DIAST BP 80-89 MM HG: CPT | Performed by: REGISTERED NURSE

## 2024-01-03 PROCEDURE — 71046 X-RAY EXAM CHEST 2 VIEWS: CPT | Mod: TC,FY | Performed by: NURSE PRACTITIONER

## 2024-01-03 PROCEDURE — 3075F SYST BP GE 130 - 139MM HG: CPT | Performed by: REGISTERED NURSE

## 2024-01-03 RX ORDER — ALBUTEROL SULFATE 90 UG/1
AEROSOL, METERED RESPIRATORY (INHALATION)
COMMUNITY
Start: 2024-01-01

## 2024-01-03 RX ORDER — PREDNISONE 20 MG/1
TABLET ORAL
COMMUNITY
Start: 2024-01-01

## 2024-01-03 RX ORDER — AZITHROMYCIN 250 MG/1
TABLET, FILM COATED ORAL
COMMUNITY
Start: 2024-01-01

## 2024-01-03 RX ORDER — DEXTROMETHORPHAN HYDROBROMIDE AND PROMETHAZINE HYDROCHLORIDE 15; 6.25 MG/5ML; MG/5ML
5 SYRUP ORAL EVERY 4 HOURS PRN
Qty: 120 ML | Refills: 0 | Status: SHIPPED | OUTPATIENT
Start: 2024-01-03

## 2024-01-03 ASSESSMENT — ENCOUNTER SYMPTOMS
FEVER: 0
ABDOMINAL PAIN: 0
SPUTUM PRODUCTION: 1
SORE THROAT: 0
COUGH: 1
CHILLS: 0
DIZZINESS: 0
SHORTNESS OF BREATH: 0

## 2024-01-03 ASSESSMENT — FIBROSIS 4 INDEX: FIB4 SCORE: 0.93

## 2024-01-03 NOTE — PROGRESS NOTES
Subjective:   Ashanti Arvizu is a 63 y.o. female who presents for Cough (Patient states was seen in [place] for cough 1/1/24, is still not better, is requesting an X-Ray for lungs to make sure does not have bronchitis )    HPI  10 days ago developed cough, was evaluated and started on azithromycin, prednisone, benzonatate, albuterol 2 days ago no reported improvement.  Was told she needs to get an x-ray.  Denies lung history but has had multiple rounds of antibiotics recently for various bacterial infections.  Tolerating p.o.  Immunizations current.    Review of Systems   Constitutional:  Negative for chills and fever.   HENT:  Positive for congestion. Negative for sore throat.    Respiratory:  Positive for cough and sputum production. Negative for shortness of breath.    Cardiovascular:  Negative for chest pain.   Gastrointestinal:  Negative for abdominal pain.   Skin:  Negative for rash.   Neurological:  Negative for dizziness.       Allergies   Allergen Reactions    Sulfa Drugs Swelling     Throat and rash    Sulfamethoxazole W-Trimethoprim      Other reaction(s): LIP SWELLING    Sulfamethoxazole-Trimethoprim Hives    Augmentin [Amoxicillin-Pot Clavulanate] Vomiting    Lasix [Furosemide]     Tape Swelling       Patient Active Problem List    Diagnosis Date Noted    ANIVAL (generalized anxiety disorder) 07/10/2023    Dyslipidemia 07/10/2023    Prediabetes 07/10/2023    Obesity (BMI 35.0-39.9 without comorbidity) (HCC) 02/25/2021    Recurrent UTI 02/25/2021    TIA (transient ischemic attack) 06/20/2017    Postablative hypothyroidism 06/09/2016    Insomnia 10/31/2014       Current Outpatient Medications Ordered in Epic   Medication Sig Dispense Refill    albuterol 108 (90 Base) MCG/ACT Aero Soln inhalation aerosol INHALE 2 PUFFS EVERY 4-6 HOURS BY INHALATION ROUTE AS NEEDED.      predniSONE (DELTASONE) 20 MG Tab TAKE 1 TABLET EVERY 12 HOURS BY ORAL ROUTE WITH MEAL(S) FOR 5 DAYS.      azithromycin (ZITHROMAX) 250 MG Tab  TAKE 2 TABLETS BY MOUTH TODAY, THEN TAKE 1 TABLET DAILY FOR 4 DAYS AS DIRECTED      promethazine-dextromethorphan (PROMETHAZINE-DM) 6.25-15 MG/5ML syrup Take 5 mL by mouth every four hours as needed for Cough. 120 mL 0    metFORMIN (GLUCOPHAGE) 500 MG Tab TAKE 1 TABLET BY MOUTH TWICE A DAY WITH FOOD 180 Tablet 1    clonazePAM (KLONOPIN) 0.5 MG Tab Take 1 Tablet by mouth every evening for 30 days. Indications: Feeling Anxious 30 Tablet 0    dicyclomine (BENTYL) 10 MG Cap Take 10 mg by mouth 4 Times a Day,Before Meals and at Bedtime.      benzonatate (TESSALON) 100 MG Cap Take 1 Capsule by mouth 3 times a day as needed for Cough. 60 Capsule 0    DULoxetine (CYMBALTA) 20 MG Cap DR Particles TAKE 1 CAPSULE BY MOUTH EVERY DAY 90 Capsule 3    hydrochlorothiazide (MICROZIDE) 12.5 MG capsule TAKE 1 CAPSULE BY MOUTH EVERY DAY IN THE MORNING FOR 90 DAYS      levothyroxine (SYNTHROID) 125 MCG Tab Take 1 Tablet by mouth every morning on an empty stomach.      HYDROcodone-acetaminophen (NORCO) 5-325 MG Tab per tablet Take 1 Tablet by mouth every 6 hours as needed. (Patient not taking: Reported on 12/11/2023)      ibuprofen (MOTRIN) 600 MG Tab Take 600 mg by mouth every 6 hours as needed. (Patient not taking: Reported on 12/11/2023)      B Complex Vitamins (VITAMIN B COMPLEX PO) Take 1 Tablet by mouth every day. (Patient not taking: Reported on 12/11/2023)       No current [facility]-ordered facility-administered medications on file.       Past Surgical History:   Procedure Laterality Date    PTOSIS REPAIR Left 2/1/2017    Procedure: PTOSIS REPAIR - UPPER (POSTERIOR APPROACH);  Surgeon: Stu Malik M.D.;  Location: SURGERY SURGICAL [facility];  Service:     IMTIAZ BY LAPAROSCOPY  2014    removal lap band    GASTRIC BANDING LAPAROSCOPIC  2011    TONSILLECTOMY  1961    ABDOMINAL HYSTERECTOMY TOTAL      APPENDECTOMY CHILD      EYE SURGERY      HYSTERECTOMY, TOTAL ABDOMINAL      MAMMOPLASTY AUGMENTATION         Social History     Tobacco  "Use    Smoking status: Former     Types: Cigarettes    Smokeless tobacco: Never   Vaping Use    Vaping Use: Never used   Substance Use Topics    Alcohol use: Not Currently     Alcohol/week: 0.0 oz     Comment: Occasionally    Drug use: Not Currently     Types: Marijuana, Oral     Comment: cbd occ       family history includes Cancer in her paternal grandfather; Diabetes in her father, paternal grandfather, and paternal grandmother; Heart Disease in her brother and maternal grandmother; No Known Problems in her brother, brother, daughter, daughter, mother, sister, sister, and son; Thyroid in her maternal grandmother.     Problem list, medications, and allergies reviewed by myself today in Epic.     Objective:   /86 (BP Location: Left arm, Patient Position: Sitting, BP Cuff Size: Large adult)   Pulse 80   Temp 36.6 °C (97.8 °F)   Resp 18   Ht 1.575 m (5' 2\")   Wt 91.6 kg (202 lb)   SpO2 93%   BMI 36.95 kg/m²     Physical Exam  Vitals and nursing note reviewed.   Constitutional:       General: She is not in acute distress.     Appearance: Normal appearance. She is well-developed. She is not ill-appearing, toxic-appearing or diaphoretic.   HENT:      Head: Normocephalic and atraumatic.      Right Ear: Tympanic membrane, ear canal and external ear normal.      Left Ear: Tympanic membrane, ear canal and external ear normal.      Nose: Congestion present. No rhinorrhea.      Mouth/Throat:      Mouth: Mucous membranes are moist.      Pharynx: No oropharyngeal exudate or posterior oropharyngeal erythema.      Comments: Postnasal drainage  Eyes:      Conjunctiva/sclera: Conjunctivae normal.   Cardiovascular:      Rate and Rhythm: Normal rate and regular rhythm.      Pulses: Normal pulses.      Heart sounds: Normal heart sounds.   Pulmonary:      Effort: Pulmonary effort is normal. No respiratory distress.      Breath sounds: Normal breath sounds. No wheezing, rhonchi or rales.      Comments: Harsh congested cough " throughout exam  Musculoskeletal:      Cervical back: Normal range of motion and neck supple.      Right lower leg: No edema.      Left lower leg: No edema.   Lymphadenopathy:      Cervical: No cervical adenopathy.   Skin:     General: Skin is warm and dry.   Neurological:      General: No focal deficit present.      Mental Status: She is alert and oriented to person, place, and time. Mental status is at baseline.   Psychiatric:         Mood and Affect: Mood normal.         Assessment/Plan:     RADIOLOGY RESULTS   DX-CHEST-2 VIEWS    Result Date: 1/3/2024  1/3/2024 11:12 AM HISTORY/REASON FOR EXAM:  Cough. TECHNIQUE/EXAM DESCRIPTION AND NUMBER OF VIEWS: Two views of the chest. COMPARISON:  11/8/2022 FINDINGS: The lungs are clear. The cardiac silhouette is normal in size. There is no evidence of pleural effusion or pneumothorax. Imaged osseous structures are grossly unremarkable.     No active disease.       Differential diagnosis discussed     1. Acute cough  DX-CHEST-2 VIEWS    promethazine-dextromethorphan (PROMETHAZINE-DM) 6.25-15 MG/5ML syrup        10 days of cough.  On day 2-3 of azithromycin, prednisone, albuterol, benzonatate.  No history of lung infections.  Chest x-ray is negative for acute cardiopulmonary disease.  Offered reassurance.  Discussed giving medications more time and to finish them completely.  Recommend adequate hydration, rest, deep breathing and coughing, ambulation as tolerated, OTC medications.    Return to clinic or go to ED if symptoms worsen or persist. Indications for ED discussed at length. Red flag symptoms discussed. All side effects of medication discussed including allergic response, GI upset, tendon injury, rash, sedation etc. Patient and/or guardian voices understanding.     I personally reviewed prior external notes and test results pertinent to today's visit as well as additional imaging and testing completed in clinic today.     Please note that this dictation was created  using voice recognition software. I have made every reasonable attempt to correct obvious errors, but I expect that there are errors of grammar and possibly content that I did not discover before finalizing the note.    This note was electronically signed by YASH Godinez

## 2024-01-03 NOTE — LETTER
January 3, 2024         Patient: Ashanti Arvizu   YOB: 1960   Date of Visit: 1/3/2024           To Whom it May Concern:    Ashanti Arvizu was seen in my clinic on 1/3/2024. She may return to work on 01/05/2024.    If you have any questions or concerns, please don't hesitate to call.        Sincerely,           JOEY Godinez.  Electronically Signed

## 2024-01-09 DIAGNOSIS — F41.1 GAD (GENERALIZED ANXIETY DISORDER): ICD-10-CM

## 2024-01-10 RX ORDER — CLONAZEPAM 0.5 MG/1
0.5 TABLET ORAL EVERY EVENING
Qty: 30 TABLET | Refills: 0 | Status: SHIPPED | OUTPATIENT
Start: 2024-01-10 | End: 2024-01-12

## 2024-01-12 ENCOUNTER — OFFICE VISIT (OUTPATIENT)
Dept: MEDICAL GROUP | Facility: LAB | Age: 64
End: 2024-01-12
Payer: COMMERCIAL

## 2024-01-12 VITALS
BODY MASS INDEX: 37.32 KG/M2 | HEART RATE: 72 BPM | RESPIRATION RATE: 16 BRPM | OXYGEN SATURATION: 95 % | WEIGHT: 202.82 LBS | DIASTOLIC BLOOD PRESSURE: 70 MMHG | TEMPERATURE: 97.2 F | HEIGHT: 62 IN | SYSTOLIC BLOOD PRESSURE: 122 MMHG

## 2024-01-12 DIAGNOSIS — J40 BRONCHITIS: ICD-10-CM

## 2024-01-12 DIAGNOSIS — G89.29 CHRONIC RIGHT-SIDED THORACIC BACK PAIN: ICD-10-CM

## 2024-01-12 DIAGNOSIS — M54.6 CHRONIC RIGHT-SIDED THORACIC BACK PAIN: ICD-10-CM

## 2024-01-12 DIAGNOSIS — F41.1 GAD (GENERALIZED ANXIETY DISORDER): ICD-10-CM

## 2024-01-12 PROCEDURE — 3074F SYST BP LT 130 MM HG: CPT | Performed by: PHYSICIAN ASSISTANT

## 2024-01-12 PROCEDURE — 3078F DIAST BP <80 MM HG: CPT | Performed by: PHYSICIAN ASSISTANT

## 2024-01-12 PROCEDURE — 99214 OFFICE O/P EST MOD 30 MIN: CPT | Performed by: PHYSICIAN ASSISTANT

## 2024-01-12 RX ORDER — HYDROXYZINE HYDROCHLORIDE 25 MG/1
25 TABLET, FILM COATED ORAL
Qty: 90 TABLET | Refills: 0 | Status: SHIPPED | OUTPATIENT
Start: 2024-01-12

## 2024-01-12 ASSESSMENT — PATIENT HEALTH QUESTIONNAIRE - PHQ9: CLINICAL INTERPRETATION OF PHQ2 SCORE: 0

## 2024-01-12 ASSESSMENT — FIBROSIS 4 INDEX: FIB4 SCORE: 0.93

## 2024-01-12 NOTE — PROGRESS NOTES
"Subjective:     CC: URI, back pain, anxiety/insomnia    HPI:   Ashanti here today with     URI symptoms  Prev prescribed azithromycin, prednisone, inhaler completed 01/05/2024  -CXR clear  Since completing abx, still having cough \"Getting better, albeit slowly\"  Denies fver, chills, N/V/D  OTC: tylenol    Back Pain  X few years  \"Feels like a bruise\"  -R side pain   -prev shingles rash in same location    Anxiety/Insomia  -feels like klonopin not effective, would like to switch to different medication  -agreeable to trial of hydroxyzine      ROS:  All ROS negative except for pertinent positives listed above.       Current Outpatient Medications Ordered in Epic   Medication Sig Dispense Refill    clonazePAM (KLONOPIN) 0.5 MG Tab Take 1 Tablet by mouth every evening for 30 days. Indications: Feeling Anxious 30 Tablet 0    albuterol 108 (90 Base) MCG/ACT Aero Soln inhalation aerosol INHALE 2 PUFFS EVERY 4-6 HOURS BY INHALATION ROUTE AS NEEDED.      metFORMIN (GLUCOPHAGE) 500 MG Tab TAKE 1 TABLET BY MOUTH TWICE A DAY WITH FOOD 180 Tablet 1    ibuprofen (MOTRIN) 600 MG Tab Take 600 mg by mouth every 6 hours as needed.      benzonatate (TESSALON) 100 MG Cap Take 1 Capsule by mouth 3 times a day as needed for Cough. 60 Capsule 0    DULoxetine (CYMBALTA) 20 MG Cap DR Particles TAKE 1 CAPSULE BY MOUTH EVERY DAY 90 Capsule 3    hydrochlorothiazide (MICROZIDE) 12.5 MG capsule TAKE 1 CAPSULE BY MOUTH EVERY DAY IN THE MORNING FOR 90 DAYS      levothyroxine (SYNTHROID) 125 MCG Tab Take 1 Tablet by mouth every morning on an empty stomach.      predniSONE (DELTASONE) 20 MG Tab TAKE 1 TABLET EVERY 12 HOURS BY ORAL ROUTE WITH MEAL(S) FOR 5 DAYS. (Patient not taking: Reported on 1/12/2024)      azithromycin (ZITHROMAX) 250 MG Tab TAKE 2 TABLETS BY MOUTH TODAY, THEN TAKE 1 TABLET DAILY FOR 4 DAYS AS DIRECTED (Patient not taking: Reported on 1/12/2024)      promethazine-dextromethorphan (PROMETHAZINE-DM) 6.25-15 MG/5ML syrup Take 5 mL by " "mouth every four hours as needed for Cough. (Patient not taking: Reported on 1/12/2024) 120 mL 0    HYDROcodone-acetaminophen (NORCO) 5-325 MG Tab per tablet Take 1 Tablet by mouth every 6 hours as needed. (Patient not taking: Reported on 12/11/2023)      dicyclomine (BENTYL) 10 MG Cap Take 10 mg by mouth 4 Times a Day,Before Meals and at Bedtime. (Patient not taking: Reported on 1/12/2024)      B Complex Vitamins (VITAMIN B COMPLEX PO) Take 1 Tablet by mouth every day. (Patient not taking: Reported on 12/11/2023)       No current [facility]-ordered facility-administered medications on file.       Objective:     Exam:  /70 (BP Location: Right arm, Patient Position: Sitting, BP Cuff Size: Adult)   Pulse 72   Temp 36.2 °C (97.2 °F) (Temporal)   Resp 16   Ht 1.575 m (5' 2\")   Wt 92 kg (202 lb 13.2 oz)   SpO2 95%   BMI 37.10 kg/m²  Body mass index is 37.1 kg/m².    Gen: Alert and oriented, No apparent distress.  HEENT: pupils PERRLA, The pinna, tragus, and ear canal are non-tender and without swelling. The ear canal is clear without discharge. The tympanic membrane is normal in appearance with a good cone of light. Oral mucosa is pink and moist with good dentition. Tongue normal in appearance without lesions and with good symmetrical movement. No buccal nodules or lesions are noted. The pharynx is normal in appearance without tonsillar swelling or exudates.   Neck: Neck is supple without lymphadenopathy.  Lungs: Normal effort, CTA bilaterally, no wheezes, rhonchi, or rales  CV: Regular rate and rhythm. No murmurs, rubs, or gallops.  Ext: No clubbing, cyanosis, edema.  Back: Full ROM, 5/5 LE strength, sensation intact bilaterally in LE, not tender to palpation over spinous processes, paraspinals neg    Assessment & Plan:     63 y.o. female with the following -     1. Bronchitis  Acute condition, resolving  Patient reports her symptoms are overall improved although she does still continue to have an intermittently " productive cough.  Lung sounds are clear today on physical exam.  Previous chest x-ray was also clear.  Advised patient to continue over-the-counter medications, albuterol as needed.  No evidence need for repeat round of antibiotics    2. ANIVAL (generalized anxiety disorder)  Chronic condition  Minimal improvement in symptoms with Klonopin 0.5 mg.  Plan to discontinue this medication and transition to hydroxyzine 25 mg nightly as needed  - hydrOXYzine HCl (ATARAX) 25 MG Tab; Take 1 Tablet by mouth at bedtime.  Dispense: 90 Tablet; Refill: 0    3. Chronic right-sided thoracic back pain  No acute findings on physical exam today.  Suspect this may be an element of postherpetic neuralgia.  Patient states her symptoms are fairly intermittent and would like to avoid new medications at this time.  Follow-up as needed      I spent a total of 25 minutes with record review, exam, communication with the patient, communication with other providers, and documentation of this encounter.      No follow-ups on file.    Please note that this dictation was created using voice recognition software. I have made every reasonable attempt to correct obvious errors, but there may be errors of grammar and possibly content that I did not discover before finalizing the note.

## 2024-04-22 DIAGNOSIS — F41.1 GAD (GENERALIZED ANXIETY DISORDER): ICD-10-CM

## 2024-04-22 RX ORDER — HYDROXYZINE HYDROCHLORIDE 25 MG/1
25 TABLET, FILM COATED ORAL
Qty: 30 TABLET | Refills: 2 | Status: SHIPPED | OUTPATIENT
Start: 2024-04-22

## 2024-05-15 ENCOUNTER — OFFICE VISIT (OUTPATIENT)
Dept: URGENT CARE | Facility: CLINIC | Age: 64
End: 2024-05-15
Payer: COMMERCIAL

## 2024-05-15 VITALS
HEIGHT: 62 IN | DIASTOLIC BLOOD PRESSURE: 88 MMHG | WEIGHT: 200 LBS | OXYGEN SATURATION: 95 % | HEART RATE: 77 BPM | SYSTOLIC BLOOD PRESSURE: 132 MMHG | RESPIRATION RATE: 18 BRPM | TEMPERATURE: 98.4 F | BODY MASS INDEX: 36.8 KG/M2

## 2024-05-15 DIAGNOSIS — J22 LRTI (LOWER RESPIRATORY TRACT INFECTION): ICD-10-CM

## 2024-05-15 PROCEDURE — 3079F DIAST BP 80-89 MM HG: CPT | Performed by: PHYSICIAN ASSISTANT

## 2024-05-15 PROCEDURE — 99213 OFFICE O/P EST LOW 20 MIN: CPT | Performed by: PHYSICIAN ASSISTANT

## 2024-05-15 PROCEDURE — 3075F SYST BP GE 130 - 139MM HG: CPT | Performed by: PHYSICIAN ASSISTANT

## 2024-05-15 RX ORDER — DOXYCYCLINE HYCLATE 100 MG
100 TABLET ORAL 2 TIMES DAILY
Qty: 14 TABLET | Refills: 0 | Status: SHIPPED | OUTPATIENT
Start: 2024-05-15 | End: 2024-05-22

## 2024-05-15 RX ORDER — BENZONATATE 200 MG/1
200 CAPSULE ORAL 3 TIMES DAILY PRN
Qty: 30 CAPSULE | Refills: 0 | Status: SHIPPED | OUTPATIENT
Start: 2024-05-15

## 2024-05-15 RX ORDER — DEXTROMETHORPHAN HYDROBROMIDE AND PROMETHAZINE HYDROCHLORIDE 15; 6.25 MG/5ML; MG/5ML
5 SYRUP ORAL 4 TIMES DAILY PRN
Qty: 120 ML | Refills: 0 | Status: SHIPPED | OUTPATIENT
Start: 2024-05-15

## 2024-05-15 RX ORDER — METHYLPREDNISOLONE 4 MG/1
TABLET ORAL
Qty: 21 TABLET | Refills: 0 | Status: SHIPPED | OUTPATIENT
Start: 2024-05-15

## 2024-05-15 ASSESSMENT — ENCOUNTER SYMPTOMS
SWEATS: 0
SINUS PAIN: 0
MYALGIAS: 0
HEMOPTYSIS: 0
RHINORRHEA: 1
SORE THROAT: 0
COUGH: 1
FEVER: 0
ABDOMINAL PAIN: 0
VOMITING: 0
SPUTUM PRODUCTION: 1
HEADACHES: 0
WHEEZING: 1
DIARRHEA: 0
SHORTNESS OF BREATH: 1
CHILLS: 0
NAUSEA: 0

## 2024-05-15 ASSESSMENT — FIBROSIS 4 INDEX: FIB4 SCORE: 0.94

## 2024-05-15 NOTE — PROGRESS NOTES
Subjective     Ashanti Arvizu is a 64 y.o. female who presents with Cough (X 1 month, patient states last night cough was extremely bad)            Cough  This is a new problem. Episode onset: > 1 month. The problem has been unchanged. The problem occurs constantly. The cough is Productive of purulent sputum. Associated symptoms include nasal congestion, postnasal drip, rhinorrhea, shortness of breath and wheezing. Pertinent negatives include no chest pain, chills, ear pain, fever, headaches, hemoptysis, myalgias, rash, sore throat or sweats. The symptoms are aggravated by lying down (breathing). She has tried a beta-agonist inhaler, OTC cough suppressant and prescription cough suppressant for the symptoms. The treatment provided no relief. Her past medical history is significant for asthma and bronchitis.         Past Medical History:   Diagnosis Date    Disorder of thyroid     Heart burn     Psychiatric problem     anxiety       Past Surgical History:   Procedure Laterality Date    PTOSIS REPAIR Left 2/1/2017    Procedure: PTOSIS REPAIR - UPPER (POSTERIOR APPROACH);  Surgeon: Stu Malik M.D.;  Location: SURGERY SURGICAL [facility];  Service:     IMTIAZ BY LAPAROSCOPY  2014    removal lap band    GASTRIC BANDING LAPAROSCOPIC  2011    TONSILLECTOMY  1961    ABDOMINAL HYSTERECTOMY TOTAL      APPENDECTOMY CHILD      EYE SURGERY      HYSTERECTOMY, TOTAL ABDOMINAL      MAMMOPLASTY AUGMENTATION         Family History   Problem Relation Age of Onset    No Known Problems Mother     Diabetes Father     No Known Problems Sister     No Known Problems Sister     Heart Disease Brother     No Known Problems Brother     No Known Problems Brother     Heart Disease Maternal Grandmother     Thyroid Maternal Grandmother     Diabetes Paternal Grandmother     Cancer Paternal Grandfather         prostate    Diabetes Paternal Grandfather     No Known Problems Daughter     No Known Problems Daughter     No Known Problems Son   "      Allergies:  Sulfa drugs, Sulfamethoxazole w-trimethoprim, Sulfamethoxazole-trimethoprim, Augmentin [amoxicillin-pot clavulanate], Lasix [furosemide], and Tape    Medications, Allergies, and current problem list reviewed today in Epic    .    Review of Systems   Constitutional:  Positive for malaise/fatigue. Negative for chills and fever.   HENT:  Positive for congestion, postnasal drip and rhinorrhea. Negative for ear pain, sinus pain and sore throat.    Respiratory:  Positive for cough, sputum production, shortness of breath and wheezing. Negative for hemoptysis.    Cardiovascular:  Negative for chest pain.   Gastrointestinal:  Negative for abdominal pain, diarrhea, nausea and vomiting.   Musculoskeletal:  Negative for myalgias.   Skin:  Negative for rash.   Neurological:  Negative for headaches.     All other systems reviewed and are negative.            Objective     /88 (BP Location: Left arm, Patient Position: Sitting, BP Cuff Size: Large adult)   Pulse 77   Temp 36.9 °C (98.4 °F)   Resp 18   Ht 1.575 m (5' 2\")   Wt 90.7 kg (200 lb)   SpO2 95%   BMI 36.58 kg/m²      Physical Exam  Constitutional:       General: She is not in acute distress.     Appearance: She is not ill-appearing.   HENT:      Head: Normocephalic and atraumatic.      Mouth/Throat:      Mouth: Mucous membranes are moist.      Pharynx: No posterior oropharyngeal erythema.   Eyes:      Conjunctiva/sclera: Conjunctivae normal.   Cardiovascular:      Rate and Rhythm: Normal rate and regular rhythm.      Heart sounds: Normal heart sounds.   Pulmonary:      Effort: Pulmonary effort is normal. No respiratory distress.      Breath sounds: No stridor. Wheezing present. No rhonchi or rales.      Comments: Deep spasmodic cough. Expiratory wheezes   Skin:     General: Skin is warm and dry.   Neurological:      General: No focal deficit present.      Mental Status: She is alert and oriented to person, place, and time.   Psychiatric:       "   Mood and Affect: Mood normal.         Behavior: Behavior normal.         Thought Content: Thought content normal.         Judgment: Judgment normal.                             Assessment & Plan        1. LRTI (lower respiratory tract infection)  RAD and Bronchitis   - doxycycline (VIBRAMYCIN) 100 MG Tab; Take 1 Tablet by mouth 2 times a day for 7 days.  Dispense: 14 Tablet; Refill: 0  - methylPREDNISolone (MEDROL DOSEPAK) 4 MG Tablet Therapy Pack; Follow schedule on package instructions.  Dispense: 21 Tablet; Refill: 0  - benzonatate (TESSALON) 200 MG capsule; Take 1 Capsule by mouth 3 times a day as needed for Cough.  Dispense: 30 Capsule; Refill: 0  - promethazine-dextromethorphan (PROMETHAZINE-DM) 6.25-15 MG/5ML syrup; Take 5 mL by mouth 4 times a day as needed for Cough.  Dispense: 120 mL; Refill: 0        Differential diagnoses, Supportive care, and indications for immediate follow-up discussed with patient.   Pathogenesis of diagnosis discussed including typical length and natural progression.   Instructed to return to clinic or nearest emergency department for any change in condition, further concerns, or worsening of symptoms.    The patient demonstrated a good understanding and agreed with the treatment plan.    Vanda Cote P.A.-C.

## 2024-05-29 RX ORDER — LEVOTHYROXINE SODIUM 125 UG/1
125 TABLET ORAL
Qty: 30 TABLET | Refills: 3 | Status: SHIPPED | OUTPATIENT
Start: 2024-05-29 | End: 2024-06-18 | Stop reason: SDUPTHER

## 2024-06-18 ENCOUNTER — OFFICE VISIT (OUTPATIENT)
Dept: MEDICAL GROUP | Facility: LAB | Age: 64
End: 2024-06-18
Payer: COMMERCIAL

## 2024-06-18 VITALS
HEIGHT: 62 IN | HEART RATE: 72 BPM | BODY MASS INDEX: 37 KG/M2 | RESPIRATION RATE: 14 BRPM | DIASTOLIC BLOOD PRESSURE: 68 MMHG | WEIGHT: 201.06 LBS | OXYGEN SATURATION: 97 % | TEMPERATURE: 96.9 F | SYSTOLIC BLOOD PRESSURE: 120 MMHG

## 2024-06-18 DIAGNOSIS — E89.0 POSTABLATIVE HYPOTHYROIDISM: ICD-10-CM

## 2024-06-18 DIAGNOSIS — R73.03 PREDIABETES: ICD-10-CM

## 2024-06-18 DIAGNOSIS — Z11.4 ENCOUNTER FOR SCREENING FOR HIV: ICD-10-CM

## 2024-06-18 DIAGNOSIS — E78.5 DYSLIPIDEMIA: ICD-10-CM

## 2024-06-18 DIAGNOSIS — F41.1 GAD (GENERALIZED ANXIETY DISORDER): ICD-10-CM

## 2024-06-18 DIAGNOSIS — E28.39 ESTROGEN DEFICIENCY: ICD-10-CM

## 2024-06-18 DIAGNOSIS — Z12.31 ENCOUNTER FOR SCREENING MAMMOGRAM FOR MALIGNANT NEOPLASM OF BREAST: ICD-10-CM

## 2024-06-18 PROCEDURE — 3074F SYST BP LT 130 MM HG: CPT | Performed by: PHYSICIAN ASSISTANT

## 2024-06-18 PROCEDURE — 3078F DIAST BP <80 MM HG: CPT | Performed by: PHYSICIAN ASSISTANT

## 2024-06-18 PROCEDURE — 99214 OFFICE O/P EST MOD 30 MIN: CPT | Performed by: PHYSICIAN ASSISTANT

## 2024-06-18 RX ORDER — HYDROXYZINE HYDROCHLORIDE 25 MG/1
25 TABLET, FILM COATED ORAL
Qty: 90 TABLET | Refills: 0 | Status: SHIPPED | OUTPATIENT
Start: 2024-06-18

## 2024-06-18 RX ORDER — LEVOTHYROXINE SODIUM 0.12 MG/1
125 TABLET ORAL
Qty: 90 TABLET | Refills: 0 | Status: SHIPPED | OUTPATIENT
Start: 2024-06-18

## 2024-06-18 ASSESSMENT — FIBROSIS 4 INDEX: FIB4 SCORE: 0.94

## 2024-06-18 NOTE — PROGRESS NOTES
Subjective:     CC: Follow-up    HPI:   Ashanti here today with    Traveling to [place] 06/28/2024  -Requesting early med refill for travel    Heat Exhaustion  -Recently visited mother in [place] last weekend  -heat 103 degrees, walking in full sun  -While walking, patient felt lightheaded, dizzy, chills  -denies fever  -denies cough, chest pain, SOB  -fluid intake: 30 oz of water per day  -1 serving of caffeine per day  -Patient reports she still feels slightly fatigued today but overall her symptoms have resolved    ROS:  Gen: no fevers/chills, no changes in weight  Eyes: no changes in vision  ENT: no sore throat, no hearing loss, no bloody nose  Pulm: no sob, no cough  CV: no chest pain, no palpitations  GI: no nausea/vomiting, no diarrhea  : no dysuria  MSk: no myalgias  Skin: no rash  Neuro: no headaches, no numbness/tingling  Heme/Lymph: no easy bruising    Current Outpatient Medications Ordered in Epic   Medication Sig Dispense Refill    levothyroxine (SYNTHROID) 125 MCG Tab Take 1 Tablet by mouth every morning on an empty stomach. 30 Tablet 3    methylPREDNISolone (MEDROL DOSEPAK) 4 MG Tablet Therapy Pack Follow schedule on package instructions. 21 Tablet 0    benzonatate (TESSALON) 200 MG capsule Take 1 Capsule by mouth 3 times a day as needed for Cough. 30 Capsule 0    promethazine-dextromethorphan (PROMETHAZINE-DM) 6.25-15 MG/5ML syrup Take 5 mL by mouth 4 times a day as needed for Cough. 120 mL 0    hydrOXYzine HCl (ATARAX) 25 MG Tab TAKE 1 TABLET BY MOUTH EVERYDAY AT BEDTIME 30 Tablet 2    albuterol 108 (90 Base) MCG/ACT Aero Soln inhalation aerosol INHALE 2 PUFFS EVERY 4-6 HOURS BY INHALATION ROUTE AS NEEDED.      metFORMIN (GLUCOPHAGE) 500 MG Tab TAKE 1 TABLET BY MOUTH TWICE A DAY WITH FOOD 180 Tablet 1    ibuprofen (MOTRIN) 600 MG Tab Take 600 mg by mouth every 6 hours as needed.      benzonatate (TESSALON) 100 MG Cap Take 1 Capsule by mouth 3 times a day as needed for Cough. 60 Capsule 0     "DULoxetine (CYMBALTA) 20 MG Cap DR Particles TAKE 1 CAPSULE BY MOUTH EVERY DAY 90 Capsule 3    hydrochlorothiazide (MICROZIDE) 12.5 MG capsule TAKE 1 CAPSULE BY MOUTH EVERY DAY IN THE MORNING FOR 90 DAYS      dicyclomine (BENTYL) 10 MG Cap Take 10 mg by mouth 4 Times a Day,Before Meals and at Bedtime. (Patient not taking: Reported on 1/12/2024)      B Complex Vitamins (VITAMIN B COMPLEX PO) Take 1 Tablet by mouth every day. (Patient not taking: Reported on 12/11/2023)       No current [facility]-ordered facility-administered medications on file.       Objective:     Exam:  /68 (BP Location: Left arm, Patient Position: Sitting, BP Cuff Size: Adult)   Pulse 72   Temp 36.1 °C (96.9 °F) (Temporal)   Resp 14   Ht 1.575 m (5' 2\")   Wt 91.2 kg (201 lb 1 oz)   SpO2 97%   BMI 36.77 kg/m²  Body mass index is 36.77 kg/m².    Gen: Alert and oriented, No apparent distress.  Neck: Neck is supple without lymphadenopathy.  Lungs: Normal effort, CTA bilaterally, no wheezes, rhonchi, or rales  CV: Regular rate and rhythm. No murmurs, rubs, or gallops.  Ext: No clubbing, cyanosis, edema.      Assessment & Plan:     64 y.o. female with the following -     1. ANIVAL (generalized anxiety disorder)  Chronic condition, stable  Continue Cymbalta 20 mg daily and hydroxyzine 25 mg nightly as needed  - hydrOXYzine HCl (ATARAX) 25 MG Tab; Take 1 Tablet by mouth at bedtime.  Dispense: 90 Tablet; Refill: 0    2. Encounter for screening mammogram for malignant neoplasm of breast  - MA-SCREENING MAMMO BILAT W/TOMOSYNTHESIS W/CAD; Future    3. Estrogen deficiency  - DS-BONE DENSITY STUDY (DEXA); Future    4. Encounter for screening for HIV  - HIV AG/AB COMBO ASSAY SCREENING; Future    5. Prediabetes  Chronic condition  Patient's lifestyle was discussed today, including healthy low-carb diet, 30-minutes of moderate exercise daily and avoiding sugars and high-fat foods.    Due for updated labs  - HEMOGLOBIN A1C; Future    6. Dyslipidemia  Chronic " condition, stable  Due for updated labs  - CBC WITH DIFFERENTIAL; Future  - Comp Metabolic Panel; Future  - Lipid Profile; Future    7. Postablative hypothyroidism  Chronic condition, stable  Continue current medication  Due for updated labs  - TSH WITH REFLEX TO FT4; Future  - levothyroxine (SYNTHROID) 125 MCG Tab; Take 1 Tablet by mouth every morning on an empty stomach.  Dispense: 90 Tablet; Refill: 0      I spent a total of 20 minutes with record review, exam, communication with the patient, communication with other providers, and documentation of this encounter.      No follow-ups on file.    Please note that this dictation was created using voice recognition software. I have made every reasonable attempt to correct obvious errors, but there may be errors of grammar and possibly content that I did not discover before finalizing the note.

## 2024-08-27 ENCOUNTER — HOSPITAL ENCOUNTER (OUTPATIENT)
Dept: LAB | Facility: MEDICAL CENTER | Age: 64
End: 2024-08-27
Attending: PHYSICIAN ASSISTANT
Payer: COMMERCIAL

## 2024-08-27 DIAGNOSIS — Z11.4 ENCOUNTER FOR SCREENING FOR HIV: ICD-10-CM

## 2024-08-27 DIAGNOSIS — E78.5 DYSLIPIDEMIA: ICD-10-CM

## 2024-08-27 DIAGNOSIS — E89.0 POSTABLATIVE HYPOTHYROIDISM: ICD-10-CM

## 2024-08-27 DIAGNOSIS — R73.03 PREDIABETES: ICD-10-CM

## 2024-08-27 LAB
ALBUMIN SERPL BCP-MCNC: 3.9 G/DL (ref 3.2–4.9)
ALBUMIN/GLOB SERPL: 1.4 G/DL
ALP SERPL-CCNC: 70 U/L (ref 30–99)
ALT SERPL-CCNC: 49 U/L (ref 2–50)
ANION GAP SERPL CALC-SCNC: 11 MMOL/L (ref 7–16)
AST SERPL-CCNC: 39 U/L (ref 12–45)
BASOPHILS # BLD AUTO: 0.9 % (ref 0–1.8)
BASOPHILS # BLD: 0.06 K/UL (ref 0–0.12)
BILIRUB SERPL-MCNC: 0.5 MG/DL (ref 0.1–1.5)
BUN SERPL-MCNC: 16 MG/DL (ref 8–22)
CALCIUM ALBUM COR SERPL-MCNC: 9.5 MG/DL (ref 8.5–10.5)
CALCIUM SERPL-MCNC: 9.4 MG/DL (ref 8.5–10.5)
CHLORIDE SERPL-SCNC: 109 MMOL/L (ref 96–112)
CHOLEST SERPL-MCNC: 222 MG/DL (ref 100–199)
CO2 SERPL-SCNC: 25 MMOL/L (ref 20–33)
CREAT SERPL-MCNC: 0.65 MG/DL (ref 0.5–1.4)
EOSINOPHIL # BLD AUTO: 0.37 K/UL (ref 0–0.51)
EOSINOPHIL NFR BLD: 5.8 % (ref 0–6.9)
ERYTHROCYTE [DISTWIDTH] IN BLOOD BY AUTOMATED COUNT: 43.3 FL (ref 35.9–50)
EST. AVERAGE GLUCOSE BLD GHB EST-MCNC: 120 MG/DL
GFR SERPLBLD CREATININE-BSD FMLA CKD-EPI: 97 ML/MIN/1.73 M 2
GLOBULIN SER CALC-MCNC: 2.7 G/DL (ref 1.9–3.5)
GLUCOSE SERPL-MCNC: 116 MG/DL (ref 65–99)
HBA1C MFR BLD: 5.8 % (ref 4–5.6)
HCT VFR BLD AUTO: 46.1 % (ref 37–47)
HDLC SERPL-MCNC: 45 MG/DL
HGB BLD-MCNC: 15 G/DL (ref 12–16)
HIV 1+2 AB+HIV1 P24 AG SERPL QL IA: NORMAL
IMM GRANULOCYTES # BLD AUTO: 0.02 K/UL (ref 0–0.11)
IMM GRANULOCYTES NFR BLD AUTO: 0.3 % (ref 0–0.9)
LDLC SERPL CALC-MCNC: 144 MG/DL
LYMPHOCYTES # BLD AUTO: 2.43 K/UL (ref 1–4.8)
LYMPHOCYTES NFR BLD: 38.1 % (ref 22–41)
MCH RBC QN AUTO: 28 PG (ref 27–33)
MCHC RBC AUTO-ENTMCNC: 32.5 G/DL (ref 32.2–35.5)
MCV RBC AUTO: 86 FL (ref 81.4–97.8)
MONOCYTES # BLD AUTO: 0.46 K/UL (ref 0–0.85)
MONOCYTES NFR BLD AUTO: 7.2 % (ref 0–13.4)
NEUTROPHILS # BLD AUTO: 3.03 K/UL (ref 1.82–7.42)
NEUTROPHILS NFR BLD: 47.7 % (ref 44–72)
NRBC # BLD AUTO: 0 K/UL
NRBC BLD-RTO: 0 /100 WBC (ref 0–0.2)
PLATELET # BLD AUTO: 218 K/UL (ref 164–446)
PMV BLD AUTO: 10.5 FL (ref 9–12.9)
POTASSIUM SERPL-SCNC: 4.3 MMOL/L (ref 3.6–5.5)
PROT SERPL-MCNC: 6.6 G/DL (ref 6–8.2)
RBC # BLD AUTO: 5.36 M/UL (ref 4.2–5.4)
SODIUM SERPL-SCNC: 145 MMOL/L (ref 135–145)
TRIGL SERPL-MCNC: 164 MG/DL (ref 0–149)
TSH SERPL DL<=0.005 MIU/L-ACNC: 0.04 UIU/ML (ref 0.38–5.33)
WBC # BLD AUTO: 6.4 K/UL (ref 4.8–10.8)

## 2024-08-27 PROCEDURE — 80053 COMPREHEN METABOLIC PANEL: CPT

## 2024-08-27 PROCEDURE — 87389 HIV-1 AG W/HIV-1&-2 AB AG IA: CPT

## 2024-08-27 PROCEDURE — 85025 COMPLETE CBC W/AUTO DIFF WBC: CPT

## 2024-08-27 PROCEDURE — 84439 ASSAY OF FREE THYROXINE: CPT

## 2024-08-27 PROCEDURE — 80061 LIPID PANEL: CPT

## 2024-08-27 PROCEDURE — 36415 COLL VENOUS BLD VENIPUNCTURE: CPT

## 2024-08-27 PROCEDURE — 83036 HEMOGLOBIN GLYCOSYLATED A1C: CPT

## 2024-08-27 PROCEDURE — 84443 ASSAY THYROID STIM HORMONE: CPT

## 2024-08-28 LAB — T4 FREE SERPL-MCNC: 1.41 NG/DL (ref 0.93–1.7)

## 2024-09-10 ENCOUNTER — OFFICE VISIT (OUTPATIENT)
Dept: MEDICAL GROUP | Facility: LAB | Age: 64
End: 2024-09-10
Payer: COMMERCIAL

## 2024-09-10 VITALS
BODY MASS INDEX: 38.14 KG/M2 | RESPIRATION RATE: 16 BRPM | OXYGEN SATURATION: 93 % | WEIGHT: 207.23 LBS | DIASTOLIC BLOOD PRESSURE: 70 MMHG | HEART RATE: 70 BPM | SYSTOLIC BLOOD PRESSURE: 120 MMHG | TEMPERATURE: 97.8 F | HEIGHT: 62 IN

## 2024-09-10 DIAGNOSIS — E78.5 DYSLIPIDEMIA: ICD-10-CM

## 2024-09-10 DIAGNOSIS — R73.03 PREDIABETES: ICD-10-CM

## 2024-09-10 DIAGNOSIS — R07.81 RIB PAIN: ICD-10-CM

## 2024-09-10 DIAGNOSIS — E89.0 POSTABLATIVE HYPOTHYROIDISM: ICD-10-CM

## 2024-09-10 PROCEDURE — 99214 OFFICE O/P EST MOD 30 MIN: CPT | Performed by: PHYSICIAN ASSISTANT

## 2024-09-10 PROCEDURE — 3078F DIAST BP <80 MM HG: CPT | Performed by: PHYSICIAN ASSISTANT

## 2024-09-10 PROCEDURE — 3074F SYST BP LT 130 MM HG: CPT | Performed by: PHYSICIAN ASSISTANT

## 2024-09-10 RX ORDER — HYDROCHLOROTHIAZIDE 12.5 MG/1
12.5 CAPSULE ORAL DAILY
Qty: 30 CAPSULE | Refills: 3 | Status: SHIPPED | OUTPATIENT
Start: 2024-09-10

## 2024-09-10 RX ORDER — LEVOTHYROXINE SODIUM 112 UG/1
112 TABLET ORAL
Qty: 90 TABLET | Refills: 0 | Status: SHIPPED | OUTPATIENT
Start: 2024-09-10

## 2024-09-10 ASSESSMENT — FIBROSIS 4 INDEX: FIB4 SCORE: 1.66

## 2024-09-10 NOTE — PROGRESS NOTES
Subjective:     CC: Back pain, lab follow-up    HPI:   Ashanti here today with     Verbal consent was acquired by the patient to use [employer] ambient listening note generation during this visit Yes     History of Present Illness  The patient presents for follow-up on lab results and back pain.    She experiences side pain, initially suspected to be shingles, which occasionally restricts her arm movement. The pain is intermittent, and she prefers not to use Tylenol or Advil for relief. She reports no discoloration, lumps, or bumps in the area. She expresses concern about the possibility of the implant solution migrating to her back, given her history of ruptured silicone breast implants that were replaced a year ago. She has not undergone a mammogram due to fear. Additionally, she mentions that her gallbladder has been removed.    She is currently on metformin, taken twice daily. Her thyroid medication dosage was increased from 100 mcg to 125 mcg during her last visit. She has never been prescribed cholesterol medication. She also takes a diuretic for bloating, edema, and high blood pressure, but has run out of refills. She believes her blood pressure is now within normal limits.        ROS:  Gen: no fevers/chills, no changes in weight  Eyes: no changes in vision  ENT: no sore throat, no hearing loss, no bloody nose  Pulm: no sob, no cough  CV: no chest pain, no palpitations  GI: no nausea/vomiting, no diarrhea  : no dysuria  MSk: no myalgias  Skin: no rash  Neuro: no headaches, no numbness/tingling  Heme/Lymph: no easy bruising    Current Outpatient Medications Ordered in Epic   Medication Sig Dispense Refill    levothyroxine (SYNTHROID) 112 MCG Tab Take 1 Tablet by mouth every morning on an empty stomach. 90 Tablet 0    hydrochlorothiazide (MICROZIDE) 12.5 MG capsule Take 1 Capsule by mouth every day. 30 Capsule 3    hydrOXYzine HCl (ATARAX) 25 MG Tab Take 1 Tablet by mouth at bedtime. 90 Tablet 0    albuterol  "108 (90 Base) MCG/ACT Aero Soln inhalation aerosol INHALE 2 PUFFS EVERY 4-6 HOURS BY INHALATION ROUTE AS NEEDED.      metFORMIN (GLUCOPHAGE) 500 MG Tab TAKE 1 TABLET BY MOUTH TWICE A DAY WITH FOOD 180 Tablet 1    ibuprofen (MOTRIN) 600 MG Tab Take 600 mg by mouth every 6 hours as needed.      DULoxetine (CYMBALTA) 20 MG Cap DR Particles TAKE 1 CAPSULE BY MOUTH EVERY DAY 90 Capsule 3    methylPREDNISolone (MEDROL DOSEPAK) 4 MG Tablet Therapy Pack Follow schedule on package instructions. (Patient not taking: Reported on 9/10/2024) 21 Tablet 0    benzonatate (TESSALON) 200 MG capsule Take 1 Capsule by mouth 3 times a day as needed for Cough. (Patient not taking: Reported on 9/10/2024) 30 Capsule 0    promethazine-dextromethorphan (PROMETHAZINE-DM) 6.25-15 MG/5ML syrup Take 5 mL by mouth 4 times a day as needed for Cough. (Patient not taking: Reported on 9/10/2024) 120 mL 0    benzonatate (TESSALON) 100 MG Cap Take 1 Capsule by mouth 3 times a day as needed for Cough. (Patient not taking: Reported on 9/10/2024) 60 Capsule 0     No current Epic-ordered facility-administered medications on file.         Objective:     Exam:  /70 (BP Location: Right arm, Patient Position: Sitting, BP Cuff Size: Adult)   Pulse 70   Temp 36.6 °C (97.8 °F) (Temporal)   Resp 16   Ht 1.575 m (5' 2\")   Wt 94 kg (207 lb 3.7 oz)   SpO2 93%   BMI 37.90 kg/m²  Body mass index is 37.9 kg/m².    Gen: Alert and oriented, No apparent distress.  Neck: Neck is supple without lymphadenopathy.  Lungs: Normal effort, CTA bilaterally, no wheezes, rhonchi, or rales  CV: Regular rate and rhythm. No murmurs, rubs, or gallops.  Chest wall: No discoloration or palpable masses, no crepitus, no paradoxical movement, no cutaneous emphysema, TTP posterior axillary line at approximately the seventh rib level on the right side  Ext: No clubbing, cyanosis, edema.      Assessment & Plan:     64 y.o. female with the following -     Problem List Items Addressed " This Visit       Postablative hypothyroidism    Relevant Medications    levothyroxine (SYNTHROID) 112 MCG Tab    Other Relevant Orders    TSH WITH REFLEX TO FT4     Other Visit Diagnoses       Rib pain        Relevant Orders    [id number] (WITH 1-VIEW CXR)          Assessment & Plan  1. Right-sided rib pain.  The pain could be due to residual silicone or inflammation, potentially causing referred pain. Alternatively, it could be a musculoskeletal issue. There is no discoloration, lumps, or bumps. A chest x-ray will be repeated, focusing on the ribs, to identify any abnormalities.    2. Prediabetes.  A1c is in the prediabetic range but shows improvement. She is advised to continue monitoring her diet. Metformin dosage will be increased to 500 mg twice daily to help control blood sugar levels.    3. Thyroid overtreatment.  Thyroid function indicates slight overtreatment. The current dosage of 125 mcg will be reduced to 112 mcg with a 90-day supply provided. TSH will be rechecked in 6 to 8 weeks.    4. Hypercholesterolemia.  Cholesterol levels have improved since last year but remain high. She has not been on cholesterol medication before. Given her cardiac calcium score from last year was zero, indicating no plaque buildup, she will continue to monitor her cholesterol levels.    5. Medication Management.  Diuretic medication can be resumed at a small dose for swelling issues. Blood pressure is currently well-controlled at 120/70 mmHg.    Follow-up  The patient will follow up in 6 to 8 weeks.        I spent a total of 15 minutes with record review, exam, communication with the patient, communication with other providers, and documentation of this encounter.      No follow-ups on file.    Please note that this dictation was created using voice recognition software. I have made every reasonable attempt to correct obvious errors, but there may be errors of grammar and possibly content that I did not discover before  finalizing the note.

## 2024-09-21 DIAGNOSIS — F41.1 GAD (GENERALIZED ANXIETY DISORDER): ICD-10-CM

## 2024-09-23 RX ORDER — HYDROXYZINE HYDROCHLORIDE 25 MG/1
25 TABLET, FILM COATED ORAL
Qty: 30 TABLET | Refills: 0 | Status: SHIPPED | OUTPATIENT
Start: 2024-09-23

## 2024-09-23 NOTE — TELEPHONE ENCOUNTER
Received request via: Pharmacy    Was the patient seen in the last year in this department? Yes  LOV : 9/10/2024   Does the patient have an active prescription (recently filled or refills available) for medication(s) requested? No    Pharmacy Name: [facility]    Does the patient have long-term Plus and need 100-day supply? (This applies to ALL medications) Patient does not have SCP

## 2024-10-03 DIAGNOSIS — F41.1 GAD (GENERALIZED ANXIETY DISORDER): ICD-10-CM

## 2024-10-03 DIAGNOSIS — E89.0 POSTABLATIVE HYPOTHYROIDISM: ICD-10-CM

## 2024-10-04 ENCOUNTER — TELEPHONE (OUTPATIENT)
Dept: MEDICAL GROUP | Facility: LAB | Age: 64
End: 2024-10-04
Payer: COMMERCIAL

## 2024-10-04 DIAGNOSIS — F41.1 GAD (GENERALIZED ANXIETY DISORDER): ICD-10-CM

## 2024-10-04 DIAGNOSIS — B02.29 POST HERPETIC NEURALGIA: ICD-10-CM

## 2024-10-04 RX ORDER — LEVOTHYROXINE SODIUM 112 UG/1
112 TABLET ORAL
Qty: 90 TABLET | Refills: 0 | Status: SHIPPED | OUTPATIENT
Start: 2024-10-04

## 2024-10-04 RX ORDER — HYDROXYZINE HYDROCHLORIDE 25 MG/1
25 TABLET, FILM COATED ORAL
Qty: 30 TABLET | Refills: 0 | Status: SHIPPED | OUTPATIENT
Start: 2024-10-04 | End: 2024-10-30

## 2024-10-04 RX ORDER — DULOXETIN HYDROCHLORIDE 20 MG/1
20 CAPSULE, DELAYED RELEASE ORAL DAILY
Qty: 90 CAPSULE | Refills: 1 | Status: SHIPPED | OUTPATIENT
Start: 2024-10-04

## 2024-10-04 RX ORDER — HYDROCHLOROTHIAZIDE 12.5 MG/1
12.5 CAPSULE ORAL DAILY
Qty: 90 CAPSULE | Refills: 1 | Status: SHIPPED | OUTPATIENT
Start: 2024-10-04

## 2024-10-24 ENCOUNTER — HOSPITAL ENCOUNTER (OUTPATIENT)
Dept: RADIOLOGY | Facility: MEDICAL CENTER | Age: 64
End: 2024-10-24
Attending: PHYSICIAN ASSISTANT
Payer: COMMERCIAL

## 2024-10-24 DIAGNOSIS — R07.81 RIB PAIN: ICD-10-CM

## 2024-10-24 PROCEDURE — 71111 X-RAY EXAM RIBS/CHEST4/> VWS: CPT

## 2024-10-30 DIAGNOSIS — F41.1 GAD (GENERALIZED ANXIETY DISORDER): ICD-10-CM

## 2024-10-30 RX ORDER — HYDROXYZINE HYDROCHLORIDE 25 MG/1
25 TABLET, FILM COATED ORAL
Qty: 30 TABLET | Refills: 0 | Status: SHIPPED | OUTPATIENT
Start: 2024-10-30

## 2024-10-31 ENCOUNTER — PHARMACY VISIT (OUTPATIENT)
Dept: PHARMACY | Facility: MEDICAL CENTER | Age: 64
End: 2024-10-31
Payer: COMMERCIAL

## 2024-10-31 PROCEDURE — RXMED WILLOW AMBULATORY MEDICATION CHARGE: Performed by: INTERNAL MEDICINE

## 2024-11-04 ENCOUNTER — OFFICE VISIT (OUTPATIENT)
Dept: MEDICAL GROUP | Facility: LAB | Age: 64
End: 2024-11-04
Payer: COMMERCIAL

## 2024-11-04 VITALS
SYSTOLIC BLOOD PRESSURE: 124 MMHG | OXYGEN SATURATION: 93 % | WEIGHT: 207 LBS | RESPIRATION RATE: 19 BRPM | TEMPERATURE: 97.3 F | BODY MASS INDEX: 38.09 KG/M2 | DIASTOLIC BLOOD PRESSURE: 68 MMHG | HEIGHT: 62 IN | HEART RATE: 69 BPM

## 2024-11-04 DIAGNOSIS — F41.1 GAD (GENERALIZED ANXIETY DISORDER): ICD-10-CM

## 2024-11-04 DIAGNOSIS — B02.29 POST HERPETIC NEURALGIA: ICD-10-CM

## 2024-11-04 DIAGNOSIS — R73.03 PREDIABETES: ICD-10-CM

## 2024-11-04 DIAGNOSIS — E66.9 OBESITY (BMI 35.0-39.9 WITHOUT COMORBIDITY): ICD-10-CM

## 2024-11-04 DIAGNOSIS — E78.5 DYSLIPIDEMIA: ICD-10-CM

## 2024-11-04 DIAGNOSIS — R07.89 CHEST WALL PAIN: ICD-10-CM

## 2024-11-04 PROCEDURE — 3078F DIAST BP <80 MM HG: CPT | Performed by: PHYSICIAN ASSISTANT

## 2024-11-04 PROCEDURE — 99214 OFFICE O/P EST MOD 30 MIN: CPT | Performed by: PHYSICIAN ASSISTANT

## 2024-11-04 PROCEDURE — 3074F SYST BP LT 130 MM HG: CPT | Performed by: PHYSICIAN ASSISTANT

## 2024-11-04 RX ORDER — DULOXETIN HYDROCHLORIDE 30 MG/1
30 CAPSULE, DELAYED RELEASE ORAL DAILY
Qty: 90 CAPSULE | Refills: 1 | Status: SHIPPED | OUTPATIENT
Start: 2024-11-04

## 2024-11-04 RX ORDER — GABAPENTIN 300 MG/1
300 CAPSULE ORAL 3 TIMES DAILY
Qty: 90 CAPSULE | Refills: 0 | Status: SHIPPED | OUTPATIENT
Start: 2024-11-04

## 2024-11-04 ASSESSMENT — FIBROSIS 4 INDEX: FIB4 SCORE: 1.66

## 2024-11-04 NOTE — PROGRESS NOTES
Subjective:     CC: f/u back/rib pain    HPI:   Ashanti here today with   Verbal consent was acquired by the patient to use [employer] ambient listening note generation during this visit YES    History of Present Illness  The patient presents for follow-up on x-ray results.    She reports discomfort in the posterior right lower rib area.  She also mentions a history of shingles in the same area.  She has a history of silicone breast implants as well as cholecystectomy    She is experiencing increased anxiety, which she describes as fluctuating in intensity. She is currently taking duloxetine and hydroxyzine nightly, but these medications do not seem to be providing significant relief. She attributes her anxiety to external stressors.    She has noticed weight gain and is seeking assistance with weight loss.        ROS:  Gen: no fevers/chills, no changes in weight  Eyes: no changes in vision  ENT: no sore throat, no hearing loss, no bloody nose  Pulm: no sob, no cough  CV: no chest pain, no palpitations  GI: no nausea/vomiting, no diarrhea  : no dysuria  MSk: no myalgias  Skin: no rash  Neuro: no headaches, no numbness/tingling  Heme/Lymph: no easy bruising    Current Outpatient Medications Ordered in Epic   Medication Sig Dispense Refill    gabapentin (NEURONTIN) 300 MG Cap Take 1 Capsule by mouth 3 times a day. 90 Capsule 0    hydrOXYzine HCl (ATARAX) 25 MG Tab TAKE 1 TABLET BY MOUTH EVERYDAY AT BEDTIME 30 Tablet 0    influenza vaccine High-Dose (FLUZONE HIGH-DOSE) 0.5 ML Suspension Prefilled Syringe injection Inject 0.5 mL into the shoulder, thigh, or buttocks. 0.5 mL 0    Zoster Vac Recomb Adjuvanted (SHINGRIX) 50 MCG/0.5ML Recon Susp Inject 0.5 mL into the shoulder, thigh, or buttocks. 0.5 mL 0    hydrochlorothiazide (MICROZIDE) 12.5 MG capsule TAKE 1 CAPSULE BY MOUTH EVERY DAY 90 Capsule 1    levothyroxine (SYNTHROID) 112 MCG Tab TAKE 1 TABLET BY MOUTH EVERY DAY IN THE MORNING ON AN EMPTY STOMACH 90 Tablet 0     "DULoxetine (CYMBALTA) 20 MG Cap DR Particles TAKE 1 CAPSULE BY MOUTH EVERY DAY 90 Capsule 1    albuterol 108 (90 Base) MCG/ACT Aero Soln inhalation aerosol INHALE 2 PUFFS EVERY 4-6 HOURS BY INHALATION ROUTE AS NEEDED.      metFORMIN (GLUCOPHAGE) 500 MG Tab TAKE 1 TABLET BY MOUTH TWICE A DAY WITH FOOD 180 Tablet 1    ibuprofen (MOTRIN) 600 MG Tab Take 600 mg by mouth every 6 hours as needed.      methylPREDNISolone (MEDROL DOSEPAK) 4 MG Tablet Therapy Pack Follow schedule on package instructions. (Patient not taking: Reported on 11/4/2024) 21 Tablet 0    benzonatate (TESSALON) 200 MG capsule Take 1 Capsule by mouth 3 times a day as needed for Cough. (Patient not taking: Reported on 11/4/2024) 30 Capsule 0    promethazine-dextromethorphan (PROMETHAZINE-DM) 6.25-15 MG/5ML syrup Take 5 mL by mouth 4 times a day as needed for Cough. (Patient not taking: Reported on 11/4/2024) 120 mL 0    benzonatate (TESSALON) 100 MG Cap Take 1 Capsule by mouth 3 times a day as needed for Cough. (Patient not taking: Reported on 11/4/2024) 60 Capsule 0     No current Epic-ordered facility-administered medications on file.       Objective:     Exam:  /68 (BP Location: Right arm, Patient Position: Sitting, BP Cuff Size: Large adult)   Pulse 69   Temp 36.3 °C (97.3 °F) (Temporal)   Resp 19   Ht 1.575 m (5' 2\")   Wt 93.9 kg (207 lb)   SpO2 93%   BMI 37.86 kg/m²  Body mass index is 37.86 kg/m².    Gen: Alert and oriented, No apparent distress.  Neck: Neck is supple without lymphadenopathy.  Lungs: Normal effort, CTA bilaterally, no wheezes, rhonchi, or rales  CV: Regular rate and rhythm. No murmurs, rubs, or gallops.  Ext: No clubbing, cyanosis, edema.    Assessment & Plan:     64 y.o. female with the following -     Assessment & Plan  1.  Postherpetic neuralgia   The pain she experiences could be residual nerve discomfort from a previous shingles episode, potentially indicative of postherpetic neuralgia. A prescription for " gabapentin was provided, with instructions to take one tablet at night initially. If well-tolerated, the dosage can be increased to twice daily, with a maximum of three times daily. A referral to physiatry was also made to evaluate and potentially treat the nerve pain.    2. Anxiety.  The dosage of Cymbalta will be increased, while the current regimen of hydroxyzine will be maintained. She has been experiencing increased anxiety over the past month, and the potential for increasing the dose of duloxetine was discussed.    3. Obesity.  A prescription for Wegovy was provided, starting at the lowest possible dose. The response to this medication will be reassessed in approximately one month. The patient was informed about the potential side effects, including gastrointestinal issues such as nausea, diarrhea, constipation, and upset stomach. The medication will be sent to her pharmacy, [facility].    Follow-up  Return in 1 month for follow up.        Problem List Items Addressed This Visit    None  Visit Diagnoses       Post herpetic neuralgia        Relevant Medications    gabapentin (NEURONTIN) 300 MG Cap    Other Relevant Orders    Referral to Pain Clinic    Chest wall pain        Relevant Orders    Referral to Pain Clinic            I spent a total of 21 minutes with record review, exam, communication with the patient, communication with other providers, and documentation of this encounter.      No follow-ups on file.    Please note that this dictation was created using voice recognition software. I have made every reasonable attempt to correct obvious errors, but there may be errors of grammar and possibly content that I did not discover before finalizing the note.

## 2024-11-20 ENCOUNTER — OFFICE VISIT (OUTPATIENT)
Dept: PHYSICAL MEDICINE AND REHAB | Facility: MEDICAL CENTER | Age: 64
End: 2024-11-20
Payer: COMMERCIAL

## 2024-11-20 VITALS
TEMPERATURE: 98 F | OXYGEN SATURATION: 92 % | WEIGHT: 210.54 LBS | HEART RATE: 66 BPM | HEIGHT: 62 IN | DIASTOLIC BLOOD PRESSURE: 84 MMHG | BODY MASS INDEX: 38.74 KG/M2 | SYSTOLIC BLOOD PRESSURE: 134 MMHG

## 2024-11-20 DIAGNOSIS — M54.9 MID BACK PAIN ON RIGHT SIDE: Primary | ICD-10-CM

## 2024-11-20 DIAGNOSIS — R29.2 HYPERREFLEXIA: ICD-10-CM

## 2024-11-20 DIAGNOSIS — B02.29 POST HERPETIC NEURALGIA: ICD-10-CM

## 2024-11-20 DIAGNOSIS — R29.2 HOFFMAN REFLEX POSITIVE: ICD-10-CM

## 2024-11-20 PROCEDURE — 3075F SYST BP GE 130 - 139MM HG: CPT | Performed by: STUDENT IN AN ORGANIZED HEALTH CARE EDUCATION/TRAINING PROGRAM

## 2024-11-20 PROCEDURE — 3079F DIAST BP 80-89 MM HG: CPT | Performed by: STUDENT IN AN ORGANIZED HEALTH CARE EDUCATION/TRAINING PROGRAM

## 2024-11-20 PROCEDURE — 99204 OFFICE O/P NEW MOD 45 MIN: CPT | Performed by: STUDENT IN AN ORGANIZED HEALTH CARE EDUCATION/TRAINING PROGRAM

## 2024-11-20 PROCEDURE — 1126F AMNT PAIN NOTED NONE PRSNT: CPT | Performed by: STUDENT IN AN ORGANIZED HEALTH CARE EDUCATION/TRAINING PROGRAM

## 2024-11-20 ASSESSMENT — PATIENT HEALTH QUESTIONNAIRE - PHQ9
5. POOR APPETITE OR OVEREATING: 2 - MORE THAN HALF THE DAYS
CLINICAL INTERPRETATION OF PHQ2 SCORE: 1
SUM OF ALL RESPONSES TO PHQ QUESTIONS 1-9: 7

## 2024-11-20 ASSESSMENT — FIBROSIS 4 INDEX: FIB4 SCORE: 1.66

## 2024-11-20 ASSESSMENT — PAIN SCALES - GENERAL: PAINLEVEL_OUTOF10: NO PAIN

## 2024-11-20 NOTE — PROGRESS NOTES
NEW PATIENT VISIT     Interventional Spine and Pain  Physiatry (Physical Medicine and Rehabilitation)     Date of Service: See Epic  Chief Complaint:   Chief Complaint   Patient presents with    New Patient     Back pain      Referring Provider: Rima Zuleta P.*   Patient Name: Ashanti Arvizu   : 1960   MRN: [id number]     History:     HPI:     Ashanti Arvizu is a 64 y.o. female with history of prediabetes, TIA, anxiety who presents to clinic for evaluation of right mid back pain. Patient reports approximately 2 years of sharp burning pain in the right periscapular area. Pain is typically 3 to 5 out of 10 in intensity and is present all the time and does not change with activities such as sitting, standing, walking, or bending. She is currently taking gabapentin 300 mg nightly. She does have a history of shingles but the shingles was a bit lower in her back than the current pain. 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 She does note that she occasionally takes ibuprofen but worries about her stomach and kidneys. She has not done physical therapy for this problem. She does intermittently notice radiation of pain down the lower back but not into the arms or legs. She denies any numbness or focal weakness. She denies any falls or changes with her bowels or bladder.      Red Flags ROS:   Fever, Chills Denies  Involuntary Weight Loss: Denies  Bladder Incontinence: Denies  Bowel Incontinence: Denies  Saddle Anesthesia: Denies       Medical Records Review:  I reviewed the note from the referring provider Rima Zuleta P.* including the note dated 24 where they discussed posterior right rib pain.       PMHx:   Past Medical History:   Diagnosis Date    Disorder of thyroid     Heart burn     Psychiatric problem     anxiety       Current Outpatient Medications on File Prior to Visit   Medication Sig Dispense Refill    gabapentin (NEURONTIN) 300 MG Cap  Take 1 Capsule by mouth 3 times a day. 90 Capsule 0    DULoxetine (CYMBALTA) 30 MG Cap DR Particles Take 1 Capsule by mouth every day. 90 Capsule 1    Tirzepatide-Weight Management 2.5 MG/0.5ML Solution Auto-injector Inject 2.5 mg under the skin every 7 days. 6 mL 0    hydrOXYzine HCl (ATARAX) 25 MG Tab TAKE 1 TABLET BY MOUTH EVERYDAY AT BEDTIME 30 Tablet 0    influenza vaccine High-Dose (FLUZONE HIGH-DOSE) 0.5 ML Suspension Prefilled Syringe injection Inject 0.5 mL into the shoulder, thigh, or buttocks. 0.5 mL 0    Zoster Vac Recomb Adjuvanted (SHINGRIX) 50 MCG/0.5ML Recon Susp Inject 0.5 mL into the shoulder, thigh, or buttocks. 0.5 mL 0    hydrochlorothiazide (MICROZIDE) 12.5 MG capsule TAKE 1 CAPSULE BY MOUTH EVERY DAY 90 Capsule 1    levothyroxine (SYNTHROID) 112 MCG Tab TAKE 1 TABLET BY MOUTH EVERY DAY IN THE MORNING ON AN EMPTY STOMACH 90 Tablet 0    metFORMIN (GLUCOPHAGE) 500 MG Tab TAKE 1 TABLET BY MOUTH TWICE A DAY WITH FOOD 180 Tablet 1    ibuprofen (MOTRIN) 600 MG Tab Take 600 mg by mouth every 6 hours as needed.      methylPREDNISolone (MEDROL DOSEPAK) 4 MG Tablet Therapy Pack Follow schedule on package instructions. (Patient not taking: Reported on 9/10/2024) 21 Tablet 0    benzonatate (TESSALON) 200 MG capsule Take 1 Capsule by mouth 3 times a day as needed for Cough. (Patient not taking: Reported on 11/20/2024) 30 Capsule 0    promethazine-dextromethorphan (PROMETHAZINE-DM) 6.25-15 MG/5ML syrup Take 5 mL by mouth 4 times a day as needed for Cough. (Patient not taking: Reported on 9/10/2024) 120 mL 0    albuterol 108 (90 Base) MCG/ACT Aero Soln inhalation aerosol INHALE 2 PUFFS EVERY 4-6 HOURS BY INHALATION ROUTE AS NEEDED. (Patient not taking: Reported on 11/20/2024)      benzonatate (TESSALON) 100 MG Cap Take 1 Capsule by mouth 3 times a day as needed for Cough. (Patient not taking: Reported on 9/10/2024) 60 Capsule 0     No current facility-administered medications on file prior to visit.         PSHx:   Past Surgical History:   Procedure Laterality Date    PTOSIS REPAIR Left 2/1/2017    Procedure: PTOSIS REPAIR - UPPER (POSTERIOR APPROACH);  Surgeon: Stu Malik M.D.;  Location: [facility];  Service:     IMTIAZ BY LAPAROSCOPY  2014    removal lap band    GASTRIC BANDING LAPAROSCOPIC  2011    TONSILLECTOMY  1961    ABDOMINAL HYSTERECTOMY TOTAL      APPENDECTOMY CHILD      EYE SURGERY      HYSTERECTOMY, TOTAL ABDOMINAL      MAMMOPLASTY AUGMENTATION         Family history   Family History   Problem Relation Age of Onset    No Known Problems Mother     Diabetes Father     No Known Problems Sister     No Known Problems Sister     Heart Disease Brother     No Known Problems Brother     No Known Problems Brother     Heart Disease Maternal Grandmother     Thyroid Maternal Grandmother     Diabetes Paternal Grandmother     Cancer Paternal Grandfather         prostate    Diabetes Paternal Grandfather     No Known Problems Daughter     No Known Problems Daughter     No Known Problems Son          Medications: Reviewed on [facility]  Outpatient Medications Marked as Taking for the 11/20/24 encounter (Office Visit) with Laurel Corea M.D.   Medication Sig Dispense Refill    gabapentin (NEURONTIN) 300 MG Cap Take 1 Capsule by mouth 3 times a day. 90 Capsule 0    DULoxetine (CYMBALTA) 30 MG Cap DR Particles Take 1 Capsule by mouth every day. 90 Capsule 1    Tirzepatide-Weight Management 2.5 MG/0.5ML Solution Auto-injector Inject 2.5 mg under the skin every 7 days. 6 mL 0    hydrOXYzine HCl (ATARAX) 25 MG Tab TAKE 1 TABLET BY MOUTH EVERYDAY AT BEDTIME 30 Tablet 0    influenza vaccine High-Dose (FLUZONE HIGH-DOSE) 0.5 ML Suspension Prefilled Syringe injection Inject 0.5 mL into the shoulder, thigh, or buttocks. 0.5 mL 0    Zoster Vac Recomb Adjuvanted (SHINGRIX) 50 MCG/0.5ML Recon Susp Inject 0.5 mL into the shoulder, thigh, or buttocks. 0.5 mL 0    hydrochlorothiazide (MICROZIDE) 12.5 MG capsule TAKE 1  "CAPSULE BY MOUTH EVERY DAY 90 Capsule 1    levothyroxine (SYNTHROID) 112 MCG Tab TAKE 1 TABLET BY MOUTH EVERY DAY IN THE MORNING ON AN EMPTY STOMACH 90 Tablet 0    metFORMIN (GLUCOPHAGE) 500 MG Tab TAKE 1 TABLET BY MOUTH TWICE A DAY WITH FOOD 180 Tablet 1    ibuprofen (MOTRIN) 600 MG Tab Take 600 mg by mouth every 6 hours as needed.          Allergies:   Allergies   Allergen Reactions    Sulfa Drugs Swelling     Throat and rash    Sulfamethoxazole W-Trimethoprim      Other reaction(s): LIP SWELLING    Sulfamethoxazole-Trimethoprim Hives    Augmentin [Amoxicillin-Pot Clavulanate] Vomiting    Lasix [Furosemide]     Tape Swelling       Social Hx:   Social History     Socioeconomic History    Marital status:      Spouse name: Not on file    Number of children: Not on file    Years of education: Not on file    Highest education level: Not on file   Occupational History    Not on file   Tobacco Use    Smoking status: Former     Types: Cigarettes    Smokeless tobacco: Never   Vaping Use    Vaping status: Never Used   Substance and Sexual Activity    Alcohol use: Not Currently     Alcohol/week: 0.0 oz     Comment: Occasionally    Drug use: Not Currently     Types: Marijuana, Oral     Comment: cbd occ    Sexual activity: Not on file   Other Topics Concern    Not on file   Social History Narrative    Not on file     Social Drivers of Health     Financial Resource Strain: Not on file   Food Insecurity: Not on file   Transportation Needs: Not on file   Physical Activity: Not on file   Stress: Not on file   Social Connections: Not on file   Intimate Partner Violence: Not on file   Housing Stability: Not on file          EXAMINATION     Physical Exam:   Vitals: /84 (BP Location: Right arm, Patient Position: Sitting, BP Cuff Size: Adult)   Pulse 66   Temp 36.7 °C (98 °F) (Temporal)   Ht 1.575 m (5' 2\")   Wt 95.5 kg (210 lb 8.6 oz)   SpO2 92%     Constitutional:   Body Habitus: Body mass index is 38.51 " kg/m².  Cooperation: Fully cooperates with exam  Appearance: Well-groomed, well-nourished  Eyes: No scleral icterus to suggest severe liver disease, no proptosis to suggest severe hyperthyroid  ENT: No obvious auditory deficits, no obvious tongue lesions, tongue midline, no facial droop   Respiratory:  Breathing comfortable on room air, no audible wheezing  Cardiovascular: Skin appears well-perfused  Psychiatric: Appropriate affect  Gait: normal gait, no use of ambulatory device, nonantalgic. Mildly impaired tandem gait.    Neurologic:  Strength:    Bilateral UE 5/5 in shoulder abductors, elbow extensors and flexors, wrist extensors, finger abductors, and finger flexors   Bilateral LE 5/5 in hip flexors, knee extensors and flexors, ankle dorsiflexors and plantarflexors  Reflexes: 2+ in bilateral patella and achilles, 3+ in bilateral brachioradialis, biceps, 2+ in bilateral triceps. Positive left>right Green's.  MSK:?No?pain with scapular range of motion. Significant tenderness to light palpation of right mid back approximately T5/T6      MEDICAL DECISION MAKING    Medical Records Review: See under HPI section    DATA    Labs:   Lab Results   Component Value Date/Time    SODIUM 145 08/27/2024 09:33 AM    POTASSIUM 4.3 08/27/2024 09:33 AM    CHLORIDE 109 08/27/2024 09:33 AM    CO2 25 08/27/2024 09:33 AM    ANION 11.0 08/27/2024 09:33 AM    GLUCOSE 116 (H) 08/27/2024 09:33 AM    BUN 16 08/27/2024 09:33 AM    CREATININE 0.65 08/27/2024 09:33 AM    CALCIUM 9.4 08/27/2024 09:33 AM    ASTSGOT 39 08/27/2024 09:33 AM    ALTSGPT 49 08/27/2024 09:33 AM    TBILIRUBIN 0.5 08/27/2024 09:33 AM    ALBUMIN 3.9 08/27/2024 09:33 AM    TOTPROTEIN 6.6 08/27/2024 09:33 AM    GLOBULIN 2.7 08/27/2024 09:33 AM    AGRATIO 1.4 08/27/2024 09:33 AM       Lab Results   Component Value Date/Time    WBC 6.4 08/27/2024 09:33 AM    RBC 5.36 08/27/2024 09:33 AM    HEMOGLOBIN 15.0 08/27/2024 09:33 AM    HEMATOCRIT 46.1 08/27/2024 09:33 AM    MCV  86.0 08/27/2024 09:33 AM    MCH 28.0 08/27/2024 09:33 AM    MCHC 32.5 08/27/2024 09:33 AM    MPV 10.5 08/27/2024 09:33 AM    NEUTSPOLYS 47.70 08/27/2024 09:33 AM    LYMPHOCYTES 38.10 08/27/2024 09:33 AM    MONOCYTES 7.20 08/27/2024 09:33 AM    EOSINOPHILS 5.80 08/27/2024 09:33 AM    BASOPHILS 0.90 08/27/2024 09:33 AM        Lab Results   Component Value Date/Time    HBA1C 5.8 (H) 08/27/2024 09:33 AM        Imaging:   I personally reviewed the following images, below are my independent reads:  X-ray ribs 10/24/24: No evidence of fracture or bony abnormality.           IMAGING radiology reads: I reviewed the following radiology reports       Results for orders placed in visit on 01/03/24    DX-CHEST-2 VIEWS    Impression  No active disease.                            Results for orders placed during the hospital encounter of 10/24/24    [id number] (WITH 1-VIEW CXR)    Impression  No evidence of rib fracture.                   Diagnosis   Visit Diagnoses     ICD-10-CM   1. Mid back pain on right side  M54.9   2. Post herpetic neuralgia  B02.29   3. Hyperreflexia  R29.2   4. Green reflex positive  R29.2         ASSESSMENT AND PLAN:  Ashanti Arvizu is a 64 y.o. female who presents to clinic for evaluation of mid back pain.     Ashanti was seen today for new patient.    Diagnoses and all orders for this visit:    Mid back pain on right side  -     Referral to Physical Therapy  -     MR-CERVICAL SPINE-W/O; Future  -     MR-THORACIC SPINE-W/O; Future    Post herpetic neuralgia  -     Referral to Physical Therapy    Hyperreflexia  -     MR-CERVICAL SPINE-W/O; Future  -     MR-THORACIC SPINE-W/O; Future    Green reflex positive  -     MR-CERVICAL SPINE-W/O; Future  -     MR-THORACIC SPINE-W/O; Future          Patient presents for evaluation of approximately 2 years of right sided mid back pain in the setting of prior shingles in that general area though she does note that her prior shingles was a bit lower down in her  back.  Given her significant tenderness to light palpation of approximately the T5/T6 dermatome and on the right side of the back, I do think this could be consistent with postherpetic neuralgia.  However given reported increased pain with lifting as well as hyperreflexia of the upper extremities and positive Akhil's bilaterally on exam today, I do want to rule out significant canal stenosis or radiculopathy by obtaining MRI of the cervical and thoracic spine.  I have also referred her to physical therapy as she has not yet had a course of physical therapy for her mid back pain.  Finally we discussed that she should increase her gabapentin to 300 mg twice a day as this is still a low-dose of this medication and can be very helpful for the neuropathic pain that she is likely experiencing.      Physical Therapy: I ordered physical therapy to focus on strengthening and stretching for the mid back    Diagnostic Workup: As above MRI of the cervical and lumbar spine    Medications: Increase gabapentin to 300 mg twice a day, discussed that sometimes this can cause sedation and she should try her morning dose for the first time on a day when she is not working    Follow-up: After the above diagnostic studies in approximately 2 weeks      Please note that this dictation was created using voice recognition software. I have made every reasonable attempt to correct obvious errors but there may be errors of grammar and content that I may have overlooked prior to finalization of this note.      Laurel Corea MD  Physical Medicine and Rehabilitation  Interventional Spine and Sports Physiatry  [facility]       GENE Zuleta, P.A.-C.   Rima Thomson, P.*.

## 2024-11-23 ENCOUNTER — HOSPITAL ENCOUNTER (OUTPATIENT)
Dept: RADIOLOGY | Facility: MEDICAL CENTER | Age: 64
End: 2024-11-23
Attending: STUDENT IN AN ORGANIZED HEALTH CARE EDUCATION/TRAINING PROGRAM
Payer: COMMERCIAL

## 2024-11-23 DIAGNOSIS — R29.2 HYPERREFLEXIA: ICD-10-CM

## 2024-11-23 DIAGNOSIS — R29.2 HOFFMAN REFLEX POSITIVE: ICD-10-CM

## 2024-11-23 DIAGNOSIS — M54.9 MID BACK PAIN ON RIGHT SIDE: ICD-10-CM

## 2024-11-23 PROCEDURE — 72141 MRI NECK SPINE W/O DYE: CPT

## 2024-11-23 PROCEDURE — 72146 MRI CHEST SPINE W/O DYE: CPT

## 2024-11-26 DIAGNOSIS — F41.1 GAD (GENERALIZED ANXIETY DISORDER): ICD-10-CM

## 2024-11-26 RX ORDER — HYDROXYZINE HYDROCHLORIDE 25 MG/1
25 TABLET, FILM COATED ORAL
Qty: 30 TABLET | Refills: 0 | Status: SHIPPED | OUTPATIENT
Start: 2024-11-26

## 2024-11-26 NOTE — TELEPHONE ENCOUNTER
Received request via: Pharmacy    Was the patient seen in the last year in this department? Yes    Does the patient have an active prescription (recently filled or refills available) for medication(s) requested? No    Pharmacy Name: [facility]    Does the patient have shelter Plus and need 100-day supply? (This applies to ALL medications) Patient does not have SCP

## 2024-12-04 ENCOUNTER — APPOINTMENT (OUTPATIENT)
Dept: PHYSICAL MEDICINE AND REHAB | Facility: MEDICAL CENTER | Age: 64
End: 2024-12-04
Payer: COMMERCIAL

## 2024-12-04 VITALS
HEART RATE: 68 BPM | WEIGHT: 211.86 LBS | TEMPERATURE: 98.1 F | BODY MASS INDEX: 38.99 KG/M2 | SYSTOLIC BLOOD PRESSURE: 150 MMHG | OXYGEN SATURATION: 98 % | HEIGHT: 62 IN | DIASTOLIC BLOOD PRESSURE: 81 MMHG

## 2024-12-04 DIAGNOSIS — M79.10 MYALGIA: Primary | ICD-10-CM

## 2024-12-04 DIAGNOSIS — M54.2 NECK PAIN ON RIGHT SIDE: ICD-10-CM

## 2024-12-04 DIAGNOSIS — M54.9 UPPER BACK PAIN ON RIGHT SIDE: ICD-10-CM

## 2024-12-04 PROCEDURE — 1125F AMNT PAIN NOTED PAIN PRSNT: CPT | Performed by: STUDENT IN AN ORGANIZED HEALTH CARE EDUCATION/TRAINING PROGRAM

## 2024-12-04 PROCEDURE — 3079F DIAST BP 80-89 MM HG: CPT | Performed by: STUDENT IN AN ORGANIZED HEALTH CARE EDUCATION/TRAINING PROGRAM

## 2024-12-04 PROCEDURE — 99214 OFFICE O/P EST MOD 30 MIN: CPT | Performed by: STUDENT IN AN ORGANIZED HEALTH CARE EDUCATION/TRAINING PROGRAM

## 2024-12-04 PROCEDURE — 3077F SYST BP >= 140 MM HG: CPT | Performed by: STUDENT IN AN ORGANIZED HEALTH CARE EDUCATION/TRAINING PROGRAM

## 2024-12-04 RX ORDER — CYCLOBENZAPRINE HCL 5 MG
5 TABLET ORAL 2 TIMES DAILY PRN
Qty: 60 TABLET | Refills: 1 | Status: SHIPPED | OUTPATIENT
Start: 2024-12-04

## 2024-12-04 ASSESSMENT — FIBROSIS 4 INDEX: FIB4 SCORE: 1.66

## 2024-12-04 ASSESSMENT — PAIN SCALES - GENERAL: PAINLEVEL_OUTOF10: 3=SLIGHT PAIN

## 2024-12-04 NOTE — PROGRESS NOTES
FOLLOW-UP PATIENT VISIT    Interventional Spine and Pain  Physiatry (Physical Medicine and Rehabilitation)     Date of Service: 24   Chief Complaint:   Chief Complaint   Patient presents with    Follow-Up     Mid back pain on right side        Patient Name: Ashanti Arvizu   : 1960   MRN: [id number]       PRIOR HISTORY    Please see new patient note by Dr. Corea for more details.     Interval History  Patient identification: Ashanti Arvizu,  1960, with history of prediabetes, TIA, anxiety, who presents today for follow-up of right mid back pain. She was last seen in clinic on 24 where pain was thought to be consistent with postherpetic neuralgia versus radiculopathy and MRI of the cervical and thoracic spine was ordered given hyperreflexia on exam. We also discussed that she should increase gabapentin to 300 mg BID and she was referred to physical therapy. Since she was last seen in clinic she reports that her symptoms of right sided mid and upper back pain have been largely stable.  She continues to take gabapentin only at night as she is worried about this medication making her too tired.  She is scheduled to start physical therapy early next week.  She denies any new symptoms of numbness, weakness, new areas of pain, impaired balance.        PMHx:   Past Medical History:   Diagnosis Date    Disorder of thyroid     Heart burn     Psychiatric problem     anxiety       PSHx:   Past Surgical History:   Procedure Laterality Date    PTOSIS REPAIR Left 2017    Procedure: PTOSIS REPAIR - UPPER (POSTERIOR APPROACH);  Surgeon: Stu Malik M.D.;  Location: [facility];  Service:     IMTIAZ BY LAPAROSCOPY      removal lap band    GASTRIC BANDING LAPAROSCOPIC      TONSILLECTOMY      ABDOMINAL HYSTERECTOMY TOTAL      APPENDECTOMY CHILD      EYE SURGERY      HYSTERECTOMY, TOTAL ABDOMINAL      MAMMOPLASTY AUGMENTATION         Family history   Family History   Problem Relation Age  of Onset    No Known Problems Mother     Diabetes Father     No Known Problems Sister     No Known Problems Sister     Heart Disease Brother     No Known Problems Brother     No Known Problems Brother     Heart Disease Maternal Grandmother     Thyroid Maternal Grandmother     Diabetes Paternal Grandmother     Cancer Paternal Grandfather         prostate    Diabetes Paternal Grandfather     No Known Problems Daughter     No Known Problems Daughter     No Known Problems Son        Medications:   Outpatient Medications Marked as Taking for the 12/4/24 encounter (Office Visit) with Laurel Corea M.D.   Medication Sig Dispense Refill    cyclobenzaprine (FLEXERIL) 5 mg tablet Take 1 Tablet by mouth 2 times a day as needed for Mild Pain, Moderate Pain or Muscle Spasms for up to 60 doses. 60 Tablet 1    hydrOXYzine HCl (ATARAX) 25 MG Tab TAKE 1 TABLET BY MOUTH EVERYDAY AT BEDTIME 30 Tablet 0    gabapentin (NEURONTIN) 300 MG Cap Take 1 Capsule by mouth 3 times a day. 90 Capsule 0    DULoxetine (CYMBALTA) 30 MG Cap DR Particles Take 1 Capsule by mouth every day. 90 Capsule 1    Tirzepatide-Weight Management 2.5 MG/0.5ML Solution Auto-injector Inject 2.5 mg under the skin every 7 days. 6 mL 0    influenza vaccine High-Dose (FLUZONE HIGH-DOSE) 0.5 ML Suspension Prefilled Syringe injection Inject 0.5 mL into the shoulder, thigh, or buttocks. 0.5 mL 0    Zoster Vac Recomb Adjuvanted (SHINGRIX) 50 MCG/0.5ML Recon Susp Inject 0.5 mL into the shoulder, thigh, or buttocks. 0.5 mL 0    hydrochlorothiazide (MICROZIDE) 12.5 MG capsule TAKE 1 CAPSULE BY MOUTH EVERY DAY 90 Capsule 1    levothyroxine (SYNTHROID) 112 MCG Tab TAKE 1 TABLET BY MOUTH EVERY DAY IN THE MORNING ON AN EMPTY STOMACH 90 Tablet 0    metFORMIN (GLUCOPHAGE) 500 MG Tab TAKE 1 TABLET BY MOUTH TWICE A DAY WITH FOOD 180 Tablet 1    ibuprofen (MOTRIN) 600 MG Tab Take 600 mg by mouth every 6 hours as needed.          Current Outpatient Medications on File Prior to Visit    Medication Sig Dispense Refill    hydrOXYzine HCl (ATARAX) 25 MG Tab TAKE 1 TABLET BY MOUTH EVERYDAY AT BEDTIME 30 Tablet 0    gabapentin (NEURONTIN) 300 MG Cap Take 1 Capsule by mouth 3 times a day. 90 Capsule 0    DULoxetine (CYMBALTA) 30 MG Cap DR Particles Take 1 Capsule by mouth every day. 90 Capsule 1    Tirzepatide-Weight Management 2.5 MG/0.5ML Solution Auto-injector Inject 2.5 mg under the skin every 7 days. 6 mL 0    influenza vaccine High-Dose (FLUZONE HIGH-DOSE) 0.5 ML Suspension Prefilled Syringe injection Inject 0.5 mL into the shoulder, thigh, or buttocks. 0.5 mL 0    Zoster Vac Recomb Adjuvanted (SHINGRIX) 50 MCG/0.5ML Recon Susp Inject 0.5 mL into the shoulder, thigh, or buttocks. 0.5 mL 0    hydrochlorothiazide (MICROZIDE) 12.5 MG capsule TAKE 1 CAPSULE BY MOUTH EVERY DAY 90 Capsule 1    levothyroxine (SYNTHROID) 112 MCG Tab TAKE 1 TABLET BY MOUTH EVERY DAY IN THE MORNING ON AN EMPTY STOMACH 90 Tablet 0    metFORMIN (GLUCOPHAGE) 500 MG Tab TAKE 1 TABLET BY MOUTH TWICE A DAY WITH FOOD 180 Tablet 1    ibuprofen (MOTRIN) 600 MG Tab Take 600 mg by mouth every 6 hours as needed.      methylPREDNISolone (MEDROL DOSEPAK) 4 MG Tablet Therapy Pack Follow schedule on package instructions. (Patient not taking: Reported on 12/4/2024) 21 Tablet 0    benzonatate (TESSALON) 200 MG capsule Take 1 Capsule by mouth 3 times a day as needed for Cough. (Patient not taking: Reported on 12/4/2024) 30 Capsule 0    promethazine-dextromethorphan (PROMETHAZINE-DM) 6.25-15 MG/5ML syrup Take 5 mL by mouth 4 times a day as needed for Cough. (Patient not taking: Reported on 12/4/2024) 120 mL 0    albuterol 108 (90 Base) MCG/ACT Aero Soln inhalation aerosol INHALE 2 PUFFS EVERY 4-6 HOURS BY INHALATION ROUTE AS NEEDED. (Patient not taking: Reported on 12/4/2024)      benzonatate (TESSALON) 100 MG Cap Take 1 Capsule by mouth 3 times a day as needed for Cough. (Patient not taking: Reported on 12/4/2024) 60 Capsule 0     No current  "facility-administered medications on file prior to visit.       Allergies:   Allergies   Allergen Reactions    Sulfa Drugs Swelling     Throat and rash    Sulfamethoxazole W-Trimethoprim      Other reaction(s): LIP SWELLING    Sulfamethoxazole-Trimethoprim Hives    Augmentin [Amoxicillin-Pot Clavulanate] Vomiting    Lasix [Furosemide]     Tape Swelling       Social Hx:   Social History     Socioeconomic History    Marital status:      Spouse name: Not on file    Number of children: Not on file    Years of education: Not on file    Highest education level: Not on file   Occupational History    Not on file   Tobacco Use    Smoking status: Former     Types: Cigarettes    Smokeless tobacco: Never   Vaping Use    Vaping status: Never Used   Substance and Sexual Activity    Alcohol use: Not Currently     Alcohol/week: 0.0 oz     Comment: Occasionally    Drug use: Not Currently     Types: Marijuana, Oral     Comment: cbd occ    Sexual activity: Not on file   Other Topics Concern    Not on file   Social History Narrative    Not on file     Social Drivers of Health     Financial Resource Strain: Not on file   Food Insecurity: Not on file   Transportation Needs: Not on file   Physical Activity: Not on file   Stress: Not on file   Social Connections: Not on file   Intimate Partner Violence: Not on file   Housing Stability: Not on file         EXAMINATION     Physical Exam:   Vitals: BP (!) 150/81 (BP Location: Right arm, Patient Position: Sitting, BP Cuff Size: Adult)   Pulse 68   Temp 36.7 °C (98.1 °F) (Temporal)   Ht 1.575 m (5' 2\")   Wt 96.1 kg (211 lb 13.8 oz)   SpO2 98%     Constitutional:   Body Habitus: Body mass index is 38.75 kg/m².  Cooperation: Fully cooperates with exam  Appearance: Well-groomed, well-nourished  Respiratory:  Breathing comfortable on room air, no audible wheezing  Cardiovascular: Skin appears well-perfused  Psychiatric: Appropriate affect  Gait: normal gait, no use of ambulatory device, " nonantalgic.     Neurologic:  Strength:    Bilateral UE 5/5 in shoulder abductors, elbow extensors and flexors, wrist extensors, finger abductors, and finger flexors   MSK:? TTP right upper trapezius, parascapular region, upper thoracic paraspinals      MEDICAL DECISION MAKING    DATA    Labs:   Lab Results   Component Value Date/Time    SODIUM 145 08/27/2024 09:33 AM    POTASSIUM 4.3 08/27/2024 09:33 AM    CHLORIDE 109 08/27/2024 09:33 AM    CO2 25 08/27/2024 09:33 AM    GLUCOSE 116 (H) 08/27/2024 09:33 AM    BUN 16 08/27/2024 09:33 AM    CREATININE 0.65 08/27/2024 09:33 AM    BUNCREATRAT 21 04/16/2021 07:29 AM        Lab Results   Component Value Date/Time    WBC 6.4 08/27/2024 09:33 AM    RBC 5.36 08/27/2024 09:33 AM    HEMOGLOBIN 15.0 08/27/2024 09:33 AM    HEMATOCRIT 46.1 08/27/2024 09:33 AM    MCV 86.0 08/27/2024 09:33 AM    MCH 28.0 08/27/2024 09:33 AM    MCHC 32.5 08/27/2024 09:33 AM    MPV 10.5 08/27/2024 09:33 AM    NEUTSPOLYS 47.70 08/27/2024 09:33 AM    LYMPHOCYTES 38.10 08/27/2024 09:33 AM    MONOCYTES 7.20 08/27/2024 09:33 AM    EOSINOPHILS 5.80 08/27/2024 09:33 AM    BASOPHILS 0.90 08/27/2024 09:33 AM        Lab Results   Component Value Date/Time    HBA1C 5.8 (H) 08/27/2024 09:33 AM          Imaging:   I personally reviewed the following images, below are my independent reads:  MRI Cervical Spine 11/23/24: Multilevel mild disc bulges at C3-4, C4-5, C5-6, C6-7. Mild bilateral foraminal stenosis at C4-5. Mild to moderate right and mild left foraminal stenosis at C5-6. Mild bilateral foraminal stenosis at C6-7. Multilevel facet arthropathy.    MRI Thoracic Spine 11/23/24: Anterior bridging osteophytes at T7-8, T8-9, T9-10. Vertebral body hemangiomas at T8, T9, T11. No significant canal or foraminal stenosis.      I reviewed the following radiology reports               Results for orders placed during the hospital encounter of 11/23/24    MR-CERVICAL SPINE-W/O    Impression  Mild multilevel degenerative  changes as above. No high-grade narrowing.          Results for orders placed during the hospital encounter of 11/23/24    MR-THORACIC SPINE-W/O    Impression  1.  Anterior bridging osteophytes at T7-T8, T8-T9, and T9-10.  2.  Otherwise unremarkable noncontrast MRI of the thoracic spine. No significant spinal canal or neuroforaminal narrowing.                                                  DIAGNOSIS   Visit Diagnoses     ICD-10-CM   1. Myalgia  M79.10   2. Neck pain on right side  M54.2   3. Upper back pain on right side  M54.9         ASSESSMENT and PLAN:     Ashanti Arvizu is a 64 y.o. female who returns to clinic for follow-up of right mid and upper back pain.    Ashanti was seen today for follow-up.    Diagnoses and all orders for this visit:    Myalgia  -     Referral to Pain Clinic  -     cyclobenzaprine (FLEXERIL) 5 mg tablet; Take 1 Tablet by mouth 2 times a day as needed for Mild Pain, Moderate Pain or Muscle Spasms for up to 60 doses.    Neck pain on right side  -     cyclobenzaprine (FLEXERIL) 5 mg tablet; Take 1 Tablet by mouth 2 times a day as needed for Mild Pain, Moderate Pain or Muscle Spasms for up to 60 doses.    Upper back pain on right side  -     cyclobenzaprine (FLEXERIL) 5 mg tablet; Take 1 Tablet by mouth 2 times a day as needed for Mild Pain, Moderate Pain or Muscle Spasms for up to 60 doses.      Patient presents for follow-up of chronic moderate to severe upper back/parascapular pain without radiation into the upper extremity.  MRI of the cervical and thoracic spine was obtained given evidence of hyperreflexia on exam, but thankfully these do not demonstrate any evidence of significant canal stenosis or cord signal change.  Patient does have a history of shingles, but given the fact that her area of pain encompasses several dermatomes in the upper back, I think this is less likely to be the major cause of her pain.  She does have significant tenderness to palpation on exam today, and I do think  that myofascial pain is a significant component of her pain at this time.  Therefore, I have prescribed cyclobenzaprine to be used up to twice a day as needed for muscle pain.  I also discussed with her that physical therapy is an excellent treatment for this type of myofascial pain.  Finally, I offered her ultrasound-guided trigger point injections targeting the right trapezius and parascapular region, which patient is interested in at this time an order was placed.  Should she not experience improvement in her pain with trigger point injection, muscle relaxant, physical therapy, it would be reasonable to consider her for right sided cervical medial branch blocks in the setting of her facet arthropathy.      Follow up: For ultrasound-guided trigger point injections, then 3 weeks after injections in clinic    Thank you for allowing me to participate in the care of this patient. If you have any questions please not hesitate to contact me.         Please note that this dictation was created using voice recognition software. I have made every reasonable attempt to correct obvious errors but there may be errors of grammar and content that I may have overlooked prior to finalization of this note.    Laurel Corea MD  Interventional Spine and Sports Physiatry  Physical Medicine and Rehabilitation  [facility]

## 2024-12-05 ENCOUNTER — APPOINTMENT (OUTPATIENT)
Dept: RADIOLOGY | Facility: MEDICAL CENTER | Age: 64
End: 2024-12-05
Attending: PHYSICIAN ASSISTANT
Payer: COMMERCIAL

## 2024-12-09 ENCOUNTER — PHYSICAL THERAPY (OUTPATIENT)
Dept: PHYSICAL THERAPY | Facility: MEDICAL CENTER | Age: 64
End: 2024-12-09
Attending: STUDENT IN AN ORGANIZED HEALTH CARE EDUCATION/TRAINING PROGRAM
Payer: COMMERCIAL

## 2024-12-09 DIAGNOSIS — B02.29 POST HERPETIC NEURALGIA: ICD-10-CM

## 2024-12-09 DIAGNOSIS — M54.9 MID BACK PAIN ON RIGHT SIDE: ICD-10-CM

## 2024-12-09 PROCEDURE — 97162 PT EVAL MOD COMPLEX 30 MIN: CPT

## 2024-12-09 PROCEDURE — 97110 THERAPEUTIC EXERCISES: CPT

## 2024-12-09 ASSESSMENT — ENCOUNTER SYMPTOMS
PAIN SCALE AT HIGHEST: 7
PAIN SCALE: 1
PAIN LOCATION: R MID BACK
PAIN SCALE AT LOWEST: 0

## 2024-12-09 NOTE — OP THERAPY EVALUATION
"  Outpatient Physical Therapy  INITIAL EVALUATION    [facility]  [address]  [place] [address]  Phone:  [phone]  Fax:  [phone]    Date of Evaluation: 12/09/2024    Patient: Ashanti Arvizu  YOB: 1960  MRN: [id number]     Referring Provider: Laurel Corea M.D.  [address]  [address]  [place] [address]   Referring Diagnosis Mid back pain on right side [M54.9];Post herpetic neuralgia [B02.29]     Time Calculation    [id number] 38 minutes         Chief Complaint: Back Problem    Visit Diagnoses     ICD-10-CM   1. Mid back pain on right side  M54.9   2. Post herpetic neuralgia  B02.29       Date of onset of impairment: No data found    Subjective:   History of Present Illness:     Mechanism of injury:  Patient is a 64 year old female with a PMH including: TIA, insomnia, obesity, L ptosis repair (2017), thyroid disorder, anxiety, shingles episode.     Pt presents to therapy with complaints of intermittent R sided mid upper back pain. Pt stating the s/s are intermittent and depends on how much work is completed. Pt stating has hx of shingles in the past in the R mid back, about 2 years ago. Started experiencing s/s a few months later. S/s are unchanged. Is experiencing numbness/tingling in the R hand, has had for a while, uncertain when it started, but not present to shingles. Additionally, notices odd sensation down the arm from shoulder into all fingers, unable to identify aggravating activities; occurs day or night. S/s in the arm can occur simultaneously to the back s/s.  Is having increase in headaches, may be due to new medications. No neck pain or loss of motion. Does not drop objects/no  deficits, no loss of strength. Trigger point injections scheduled for Wednesday.     Per physiatry note on 12/4/24, \"   Patient presents for follow-up of chronic moderate to severe upper back/parascapular pain without radiation " "into the upper extremity.  MRI of the cervical and thoracic spine was obtained given evidence of hyperreflexia on exam, but thankfully these do not demonstrate any evidence of significant canal stenosis or cord signal change.  Patient does have a history of shingles, but given the fact that her area of pain encompasses several dermatomes in the upper back, I think this is less likely to be the major cause of her pain.  She does have significant tenderness to palpation on exam today, and I do think that myofascial pain is a significant component of her pain at this time.  Therefore, I have prescribed cyclobenzaprine to be used up to twice a day as needed for muscle pain.  I also discussed with her that physical therapy is an excellent treatment for this type of myofascial pain.  Finally, I offered her ultrasound-guided trigger point injections targeting the right trapezius and parascapular region, which patient is interested in at this time an order was placed.\"    Pt currently denies: Dizziness, diploplia, dysphasia, dysarthria, drop attacks, nausea, nystagmus, vision changes.      Pain:     Current pain ratin    At best pain ratin    At worst pain ratin    Location:  R mid back    Pain Comments::  Aggravating: bringing both arms forward (outreached),     Relieving:  Treatments:     Treatment Comments:  Medications   Patch   Activities of Daily Living:     Patient reported ADL status: Patient's current daily routine includes:  Work: Caregiver 1x/week   Hobbies: N/A   Exercise: No current exercise routine in place      Patient Goals:     Patient goals for therapy:  Decreased pain      Past Medical History:   Diagnosis Date    Disorder of thyroid     Heart burn     Psychiatric problem     anxiety     Past Surgical History:   Procedure Laterality Date    PTOSIS REPAIR Left 2017    Procedure: PTOSIS REPAIR - UPPER (POSTERIOR APPROACH);  Surgeon: Stu Malik M.D.;  Location: [facility];  Service:     IMTIAZ BY LAPAROSCOPY  2014    removal lap band    GASTRIC BANDING LAPAROSCOPIC  2011    TONSILLECTOMY  1961    ABDOMINAL HYSTERECTOMY TOTAL      APPENDECTOMY CHILD      EYE SURGERY      HYSTERECTOMY, TOTAL ABDOMINAL      MAMMOPLASTY AUGMENTATION       Social History     Tobacco Use    Smoking status: Former     Types: Cigarettes    Smokeless tobacco: Never   Substance Use Topics    Alcohol use: Not Currently     Alcohol/week: 0.0 oz     Comment: Occasionally     Family and Occupational History     Socioeconomic History    Marital status:      Spouse name: Not on file    Number of children: Not on file    Years of education: Not on file    Highest education level: Not on file   Occupational History    Not on file       Objective     Postural Observations  Seated posture: poor    Additional Postural Observation Details  Forward head and rounded shoulders     Shoulder Screen    Shoulder active range of motion within functional limits.  Shoulder range of motion within functional limits with the following exceptions: Hand BTH and BTB WFL and no increase in s/s     Thoracic screen:  SB to R; increase in s/s   Extension limited   Rotation sig limited to R>L; incr in s/s     Neurological Testing     Myotome testing   Cervical (left)   All left cervical myotomes within normal limits    Cervical (right)   All right cervical myotomes within normal limits    Dermatome testing   Cervical (left)   All left cervical dermatomes intact    Cervical (right)   All right cervical dermatomes intact    Additional Neurological Details  No hand atrophy B; hands warm to touch   Capillary refill <2 sec B    Palpation     Additional Palpation Details  No TTP c/s paraspinals  TTP t/s paraspinals R      Active Range of Motion     Cervical Spine   Flexion: decreased  Extension: decreased  Left lateral flexion: decreased  Right lateral flexion: decreased  Left rotation: decreased (47 deg)  Right rotation: decreased (46  deg)    Additional Active Range of Motion Details  Cervical ROM without incr in s/s     Tests     Left Shoulder   Negative Spurling's sign.     Right Shoulder   Negative Spurling's sign.         Therapeutic Exercises (CPT 44655):     1. pt education, re: posture education, PT exam findings and upcoming POC    2. new hep as below      Therapeutic Exercise Summary: Access Code: [email or web address]  URL: [email or web address]  Date: 12/09/2024  Prepared by: Byron Durand    Exercises  - Open Book Chest Stretch on Towel Roll  - 2 x daily - 7 x weekly - 1-2 sets - 15 reps  - Sidelying Thoracic Lumbar Rotation  - 2 x daily - 7 x weekly - 1-2 sets - 15 reps  - Correct Seated Posture  - 7 x weekly  - Standing Median Nerve Glide  - 2-3 x daily - 7 x weekly - 1 sets - 15 reps    Pt performed these exercises with instruction and SPV.  Provided handout with these exercises for daily HEP.          Time-based treatments/modalities:           Assessment, Response and Plan:   Impairments: abnormal or restricted ROM, impaired physical strength and lacks appropriate home exercise program    Assessment details:  Patient is a pleasant and cooperative R hand dominant 64 year old female who presents to therapy with R mid back pain. She has a history of shingles with a symptom-free period x several months before return of s/s in a similar region in R t/s. Stating she has additional newer onset of headaches -attributes this to change in medications, and R hand numbness that can sometimes occur concurrently with back pain. Exam findings suggestive of thoracic and c/s ROM limitations, TTP at t/s paraspinals, poor postural awareness. Improved s/s in session following thoracic mobilization exercises. Pt may benefit from skilled physical therapy in order to address above impairments in order to improve QOL and return to reported ADL's.     Prognosis: good    Goals:   Short Term Goals:   1. Pt will be independent with written HEP.      Short  term goal time span:  2-4 weeks      Long Term Goals:    1. Pt will be independent with written HEP.  2. Pt will have an NDI with min disability or better.  3. 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   4. Pt will have 50% or fewer occurrences of tingling in extremities      Long term goal time span:  6-8 weeks    Plan:   Therapy options:  Physical therapy treatment to continue  Planned therapy interventions:  Neuromuscular Re-education (CPT 54766), E Stim Unattended (CPT 25018), Manual Therapy (CPT 74304), Therapeutic Exercise (CPT 83107) and Therapeutic Activities (CPT 73917)  Frequency: 1-2x/wk.  Duration in weeks:  8  Discussed with:  Patient  Plan details:  UPOC: 2/7/25          Functional Assessment Used: NT        Referring provider co-signature:  I have reviewed this plan of care and my co-signature certifies the need for services.    Certification Period: 12/09/2024 to 2/7/25    Physician Signature: ________________________________ Date: ______________

## 2024-12-11 ENCOUNTER — OFFICE VISIT (OUTPATIENT)
Dept: PHYSICAL MEDICINE AND REHAB | Facility: MEDICAL CENTER | Age: 64
End: 2024-12-11
Payer: COMMERCIAL

## 2024-12-11 VITALS
DIASTOLIC BLOOD PRESSURE: 73 MMHG | WEIGHT: 209 LBS | HEART RATE: 72 BPM | BODY MASS INDEX: 38.46 KG/M2 | OXYGEN SATURATION: 96 % | HEIGHT: 62 IN | SYSTOLIC BLOOD PRESSURE: 143 MMHG | TEMPERATURE: 97.6 F

## 2024-12-11 DIAGNOSIS — M79.10 MYALGIA: Primary | ICD-10-CM

## 2024-12-11 PROCEDURE — 3077F SYST BP >= 140 MM HG: CPT | Performed by: STUDENT IN AN ORGANIZED HEALTH CARE EDUCATION/TRAINING PROGRAM

## 2024-12-11 PROCEDURE — 3078F DIAST BP <80 MM HG: CPT | Performed by: STUDENT IN AN ORGANIZED HEALTH CARE EDUCATION/TRAINING PROGRAM

## 2024-12-11 PROCEDURE — 1126F AMNT PAIN NOTED NONE PRSNT: CPT | Performed by: STUDENT IN AN ORGANIZED HEALTH CARE EDUCATION/TRAINING PROGRAM

## 2024-12-11 PROCEDURE — 99212 OFFICE O/P EST SF 10 MIN: CPT | Performed by: STUDENT IN AN ORGANIZED HEALTH CARE EDUCATION/TRAINING PROGRAM

## 2024-12-11 RX ORDER — DEXAMETHASONE SODIUM PHOSPHATE 4 MG/ML
4 INJECTION, SOLUTION INTRA-ARTICULAR; INTRALESIONAL; INTRAMUSCULAR; INTRAVENOUS; SOFT TISSUE ONCE
Status: COMPLETED | OUTPATIENT
Start: 2024-12-11 | End: 2024-12-11

## 2024-12-11 RX ORDER — BUPIVACAINE HYDROCHLORIDE 5 MG/ML
2 INJECTION, SOLUTION EPIDURAL; INTRACAUDAL ONCE
Status: COMPLETED | OUTPATIENT
Start: 2024-12-11 | End: 2024-12-11

## 2024-12-11 ASSESSMENT — FIBROSIS 4 INDEX: FIB4 SCORE: 1.66

## 2024-12-11 ASSESSMENT — PAIN SCALES - GENERAL: PAINLEVEL_OUTOF10: NO PAIN

## 2024-12-11 NOTE — PROCEDURES
Date of Service: 12/11/2024     Physician/s: Laurel Corea MD    Pre-operative Diagnosis: Myalgia (M79.1)    Post-operative Diagnosis: Myalgia (M79.1)    Procedure: Right trapezius muscle, right rhomboid, right thoracic paraspinal *** trigger point injections    Description of procedure:    The risks, benefits, and alternatives of the procedure were reviewed and discussed with the patient.  Written informed consent was freely obtained. A pre-procedural time-out was conducted by the physician verifying patient’s identity, procedure to be performed, procedure site and side, and allergy verification. Appropriate equipment was determined to be in place for the procedure.     In the office suite exam room the patient was placed in a prone position and the skin areas for injection over the {Right trapezius muscle, right rhomboid, left trapezius muscle, left rhomboid} was marked. A solution was prepared with 1 mL of 4 mg/mL of dexamethasone, 4.5 mL of 1% lidocaine and 4.5 mL of 0.5% bupivicaine. The areas of pain was then prepped and draped in the usual sterile fashion. Ultrasound was confirmed to view the adjacent structures for blood vessels and nerves and to confirm the needle path was not within the structures nor was it too deep to be within the lung. A 27g needle was placed into each of the markings at the areas above under ultrasound guidance with an in plane approach.. After negative aspiration, approximately a 1.5 mL of the above solution was injected. The needle was removed intact after each trigger point injection, and the patient's back was covered with a 4x4 gauze, the area was cleansed with sterile normal saline, and a dressing was applied. There were no complications noted. The images were uploaded to our media tab for permanent storage.    Preprocedural pain: ***/10  Postprocedural pain: ***/10    Laurel Corea MD  Physical Medicine and Rehabilitation  Interventional Spine and Sports Physiatry  [facility]

## 2024-12-11 NOTE — PROGRESS NOTES
FOLLOW-UP PATIENT VISIT    Interventional Spine and Pain  Physiatry (Physical Medicine and Rehabilitation)     Date of Service: 24   Chief Complaint:   Chief Complaint   Patient presents with    Follow-Up     Myalgia      Patient Name: Ashanti Arvizu   : 1960   MRN: [id number]       PRIOR HISTORY    Please see new patient note by Dr. Corea for more details.     Interval History  Patient identification: Ashanti Arvizu,  1960, with history of prediabetes, TIA, anxiety, who presents today for follow-up of right upper and mid back pain. Patient was last seen in clinic 24 where I prescribed Flexeril and referred to physical therapy. Patient presents today for planned right sided trapezius and parascapular trigger point injections.  However, patient reports that over the past week she has had significant improvement in her pain with using Flexeril as needed as well as with starting physical therapy.  Her pain is currently 0 out of 10.  She denies any new pain, weakness, numbness.          PMHx:   Past Medical History:   Diagnosis Date    Disorder of thyroid     Heart burn     Psychiatric problem     anxiety       PSHx:   Past Surgical History:   Procedure Laterality Date    PTOSIS REPAIR Left 2017    Procedure: PTOSIS REPAIR - UPPER (POSTERIOR APPROACH);  Surgeon: Stu Malik M.D.;  Location: SURGERY SURGICAL [facility];  Service:     IMTIAZ BY LAPAROSCOPY      removal lap band    GASTRIC BANDING LAPAROSCOPIC      TONSILLECTOMY      ABDOMINAL HYSTERECTOMY TOTAL      APPENDECTOMY CHILD      EYE SURGERY      HYSTERECTOMY, TOTAL ABDOMINAL      MAMMOPLASTY AUGMENTATION         Family history   Family History   Problem Relation Age of Onset    No Known Problems Mother     Diabetes Father     No Known Problems Sister     No Known Problems Sister     Heart Disease Brother     No Known Problems Brother     No Known Problems Brother     Heart Disease Maternal Grandmother     Thyroid Maternal  Grandmother     Diabetes Paternal Grandmother     Cancer Paternal Grandfather         prostate    Diabetes Paternal Grandfather     No Known Problems Daughter     No Known Problems Daughter     No Known Problems Son        Medications:   Outpatient Medications Marked as Taking for the 12/11/24 encounter (Office Visit) with Laurel Corea M.D.   Medication Sig Dispense Refill    cyclobenzaprine (FLEXERIL) 5 mg tablet Take 1 Tablet by mouth 2 times a day as needed for Mild Pain, Moderate Pain or Muscle Spasms for up to 60 doses. 60 Tablet 1    hydrOXYzine HCl (ATARAX) 25 MG Tab TAKE 1 TABLET BY MOUTH EVERYDAY AT BEDTIME 30 Tablet 0    gabapentin (NEURONTIN) 300 MG Cap Take 1 Capsule by mouth 3 times a day. 90 Capsule 0    DULoxetine (CYMBALTA) 30 MG Cap DR Particles Take 1 Capsule by mouth every day. 90 Capsule 1    Tirzepatide-Weight Management 2.5 MG/0.5ML Solution Auto-injector Inject 2.5 mg under the skin every 7 days. 6 mL 0    influenza vaccine High-Dose (FLUZONE HIGH-DOSE) 0.5 ML Suspension Prefilled Syringe injection Inject 0.5 mL into the shoulder, thigh, or buttocks. 0.5 mL 0    Zoster Vac Recomb Adjuvanted (SHINGRIX) 50 MCG/0.5ML Recon Susp Inject 0.5 mL into the shoulder, thigh, or buttocks. 0.5 mL 0    hydrochlorothiazide (MICROZIDE) 12.5 MG capsule TAKE 1 CAPSULE BY MOUTH EVERY DAY 90 Capsule 1    levothyroxine (SYNTHROID) 112 MCG Tab TAKE 1 TABLET BY MOUTH EVERY DAY IN THE MORNING ON AN EMPTY STOMACH 90 Tablet 0    metFORMIN (GLUCOPHAGE) 500 MG Tab TAKE 1 TABLET BY MOUTH TWICE A DAY WITH FOOD 180 Tablet 1    ibuprofen (MOTRIN) 600 MG Tab Take 600 mg by mouth every 6 hours as needed.          Current Outpatient Medications on File Prior to Visit   Medication Sig Dispense Refill    cyclobenzaprine (FLEXERIL) 5 mg tablet Take 1 Tablet by mouth 2 times a day as needed for Mild Pain, Moderate Pain or Muscle Spasms for up to 60 doses. 60 Tablet 1    hydrOXYzine HCl (ATARAX) 25 MG Tab TAKE 1 TABLET BY MOUTH  EVERYDAY AT BEDTIME 30 Tablet 0    gabapentin (NEURONTIN) 300 MG Cap Take 1 Capsule by mouth 3 times a day. 90 Capsule 0    DULoxetine (CYMBALTA) 30 MG Cap DR Particles Take 1 Capsule by mouth every day. 90 Capsule 1    Tirzepatide-Weight Management 2.5 MG/0.5ML Solution Auto-injector Inject 2.5 mg under the skin every 7 days. 6 mL 0    influenza vaccine High-Dose (FLUZONE HIGH-DOSE) 0.5 ML Suspension Prefilled Syringe injection Inject 0.5 mL into the shoulder, thigh, or buttocks. 0.5 mL 0    Zoster Vac Recomb Adjuvanted (SHINGRIX) 50 MCG/0.5ML Recon Susp Inject 0.5 mL into the shoulder, thigh, or buttocks. 0.5 mL 0    hydrochlorothiazide (MICROZIDE) 12.5 MG capsule TAKE 1 CAPSULE BY MOUTH EVERY DAY 90 Capsule 1    levothyroxine (SYNTHROID) 112 MCG Tab TAKE 1 TABLET BY MOUTH EVERY DAY IN THE MORNING ON AN EMPTY STOMACH 90 Tablet 0    metFORMIN (GLUCOPHAGE) 500 MG Tab TAKE 1 TABLET BY MOUTH TWICE A DAY WITH FOOD 180 Tablet 1    ibuprofen (MOTRIN) 600 MG Tab Take 600 mg by mouth every 6 hours as needed.      methylPREDNISolone (MEDROL DOSEPAK) 4 MG Tablet Therapy Pack Follow schedule on package instructions. (Patient not taking: Reported on 9/10/2024) 21 Tablet 0    benzonatate (TESSALON) 200 MG capsule Take 1 Capsule by mouth 3 times a day as needed for Cough. (Patient not taking: Reported on 11/20/2024) 30 Capsule 0    promethazine-dextromethorphan (PROMETHAZINE-DM) 6.25-15 MG/5ML syrup Take 5 mL by mouth 4 times a day as needed for Cough. (Patient not taking: Reported on 9/10/2024) 120 mL 0    albuterol 108 (90 Base) MCG/ACT Aero Soln inhalation aerosol INHALE 2 PUFFS EVERY 4-6 HOURS BY INHALATION ROUTE AS NEEDED. (Patient not taking: Reported on 11/20/2024)      benzonatate (TESSALON) 100 MG Cap Take 1 Capsule by mouth 3 times a day as needed for Cough. (Patient not taking: Reported on 9/10/2024) 60 Capsule 0     No current facility-administered medications on file prior to visit.       Allergies:   Allergies  "  Allergen Reactions    Sulfa Drugs Swelling     Throat and rash    Sulfamethoxazole W-Trimethoprim      Other reaction(s): LIP SWELLING    Sulfamethoxazole-Trimethoprim Hives    Augmentin [Amoxicillin-Pot Clavulanate] Vomiting    Lasix [Furosemide]     Tape Swelling       Social Hx:   Social History     Socioeconomic History    Marital status:      Spouse name: Not on file    Number of children: Not on file    Years of education: Not on file    Highest education level: Not on file   Occupational History    Not on file   Tobacco Use    Smoking status: Former     Types: Cigarettes    Smokeless tobacco: Never   Vaping Use    Vaping status: Never Used   Substance and Sexual Activity    Alcohol use: Not Currently     Alcohol/week: 0.0 oz     Comment: Occasionally    Drug use: Not Currently     Types: Marijuana, Oral     Comment: cbd occ    Sexual activity: Not on file   Other Topics Concern    Not on file   Social History Narrative    Not on file     Social Drivers of Health     Financial Resource Strain: Not on file   Food Insecurity: Not on file   Transportation Needs: Not on file   Physical Activity: Not on file   Stress: Not on file   Social Connections: Not on file   Intimate Partner Violence: Not on file   Housing Stability: Not on file         EXAMINATION     Physical Exam:   Vitals: BP (!) 143/73 (BP Location: Right arm, Patient Position: Sitting, BP Cuff Size: Large adult)   Pulse 72   Temp 36.4 °C (97.6 °F) (Temporal)   Ht 1.575 m (5' 2\")   Wt 94.8 kg (208 lb 15.9 oz)   SpO2 96%     Constitutional:   Body Habitus: Body mass index is 38.23 kg/m².  Cooperation: Fully cooperates with exam  Appearance: Well-groomed, well-nourished  Respiratory:  Breathing comfortable on room air, no audible wheezing  Cardiovascular: Skin appears well-perfused  Psychiatric: Appropriate affect  Gait: normal gait, no use of ambulatory device, nonantalgic.         MEDICAL DECISION MAKING    DATA    Labs:   Lab Results "   Component Value Date/Time    SODIUM 145 08/27/2024 09:33 AM    POTASSIUM 4.3 08/27/2024 09:33 AM    CHLORIDE 109 08/27/2024 09:33 AM    CO2 25 08/27/2024 09:33 AM    GLUCOSE 116 (H) 08/27/2024 09:33 AM    BUN 16 08/27/2024 09:33 AM    CREATININE 0.65 08/27/2024 09:33 AM    BUNCREATRAT 21 04/16/2021 07:29 AM        Lab Results   Component Value Date/Time    WBC 6.4 08/27/2024 09:33 AM    RBC 5.36 08/27/2024 09:33 AM    HEMOGLOBIN 15.0 08/27/2024 09:33 AM    HEMATOCRIT 46.1 08/27/2024 09:33 AM    MCV 86.0 08/27/2024 09:33 AM    MCH 28.0 08/27/2024 09:33 AM    MCHC 32.5 08/27/2024 09:33 AM    MPV 10.5 08/27/2024 09:33 AM    NEUTSPOLYS 47.70 08/27/2024 09:33 AM    LYMPHOCYTES 38.10 08/27/2024 09:33 AM    MONOCYTES 7.20 08/27/2024 09:33 AM    EOSINOPHILS 5.80 08/27/2024 09:33 AM    BASOPHILS 0.90 08/27/2024 09:33 AM        Lab Results   Component Value Date/Time    HBA1C 5.8 (H) 08/27/2024 09:33 AM          Imaging:     I reviewed the following radiology reports             Results for orders placed during the hospital encounter of 11/23/24    MR-CERVICAL SPINE-W/O    Impression  Mild multilevel degenerative changes as above. No high-grade narrowing.          Results for orders placed during the hospital encounter of 11/23/24    MR-THORACIC SPINE-W/O    Impression  1.  Anterior bridging osteophytes at T7-T8, T8-T9, and T9-10.  2.  Otherwise unremarkable noncontrast MRI of the thoracic spine. No significant spinal canal or neuroforaminal narrowing.                                           DIAGNOSIS   Visit Diagnoses     ICD-10-CM   1. Myalgia  M79.10         ASSESSMENT and PLAN:     Ashanti Arvizu is a 64 y.o. female who returns to clinic for follow-up of right upper and mid back pain.    Patient initially presented today for scheduled right sided ultrasound-guided trigger point injections targeting the trapezius and parascapular region, however patient reports that her pain is currently 0 as a result of starting Flexeril  as needed as well as starting physical therapy, and pain has been significantly improved over the past week since she was last seen in clinic.  Therefore, I recommended that she continue her current conservative management with as needed muscle relaxant and ongoing physical therapy, and that way if she notes worsening or new pain, we will still have trigger point injections as an option for treatment of her pain in the future.  Patient voiced understanding and agrees with treatment plan.    Follow up: In 4 weeks    Thank you for allowing me to participate in the care of this patient. If you have any questions please not hesitate to contact me.         Please note that this dictation was created using voice recognition software. I have made every reasonable attempt to correct obvious errors but there may be errors of grammar and content that I may have overlooked prior to finalization of this note.    Laurel Corea MD  Interventional Spine and Sports Physiatry  Physical Medicine and Rehabilitation  [facility]

## 2024-12-16 ENCOUNTER — APPOINTMENT (OUTPATIENT)
Dept: PHYSICAL THERAPY | Facility: MEDICAL CENTER | Age: 64
End: 2024-12-16
Attending: STUDENT IN AN ORGANIZED HEALTH CARE EDUCATION/TRAINING PROGRAM
Payer: COMMERCIAL

## 2024-12-16 DIAGNOSIS — E89.0 POSTABLATIVE HYPOTHYROIDISM: ICD-10-CM

## 2024-12-16 RX ORDER — LEVOTHYROXINE SODIUM 125 UG/1
125 TABLET ORAL
Qty: 90 TABLET | Refills: 0 | Status: SHIPPED | OUTPATIENT
Start: 2024-12-16

## 2024-12-19 DIAGNOSIS — F41.1 GAD (GENERALIZED ANXIETY DISORDER): ICD-10-CM

## 2024-12-19 NOTE — TELEPHONE ENCOUNTER
Received request via: Pharmacy    Was the patient seen in the last year in this department? Yes    Does the patient have an active prescription (recently filled or refills available) for medication(s) requested? No    Pharmacy Name:   [facility]/pharmacy #[id number] - [place], [place] - [address]  [address]  [place] [address]  Phone: [phone] Fax: [phone]       Does the patient have residential Plus and need 100-day supply? (This applies to ALL medications) Patient does not have SCP

## 2024-12-20 RX ORDER — HYDROXYZINE HYDROCHLORIDE 25 MG/1
25 TABLET, FILM COATED ORAL
Qty: 90 TABLET | Refills: 1 | Status: SHIPPED | OUTPATIENT
Start: 2024-12-20

## 2025-01-07 ENCOUNTER — APPOINTMENT (OUTPATIENT)
Dept: PHYSICAL MEDICINE AND REHAB | Facility: MEDICAL CENTER | Age: 65
End: 2025-01-07
Payer: COMMERCIAL

## 2025-01-14 NOTE — PROGRESS NOTES
Verbal consent was acquired by the patient to use [employer] ambient listening note generation during this visit Yes     FOLLOW-UP PATIENT VISIT    Interventional Spine and Pain  Physiatry (Physical Medicine and Rehabilitation)     Date of Service: 01/15/25   Chief Complaint:   Chief Complaint   Patient presents with    Follow-Up     Myalgia      Patient Name: Ashanti Arvizu   : 1960   MRN: [id number]       PRIOR HISTORY    Please see new patient note by Dr. Corea for more details.     Interval History  Patient identification: Ashanti Arvizu,  1960, with history of prediabetes, TIA, anxiety, who presents today for follow-up of right upper and mid back pain.    History of Present Illness  The patient is a 64-year-old female who presents today for follow-up of right scapular pain. She was last seen in the clinic on 2024, where right trapezius and parascapular trigger point injections were planned. However, her pain significantly improved with the addition of Flexeril and the initiation of physical therapy, so the trigger point injections were deferred.    Since she was last seen she reports ongoing improvement in the right scapular region pain, just intermittent 1 out of 10 soreness and does not experience severe pain that necessitates rest. She has not been attending physical therapy sessions due to scheduling conflicts. She reports no new areas of pain, weakness, numbness, or tingling. Her current medication regimen includes gabapentin, which she takes exclusively at night due to its sedative effects.    She also reports symptoms suggestive of plantar fasciitis, describing a sensation akin to having a coin under her foot. She attributes this discomfort to her weight and limited mobility exercises for the heel. She has previously consulted a podiatrist for this issue several years ago.    MEDICATIONS  Current: Gabapentin        PMHx:   Past Medical History:   Diagnosis Date    Disorder of thyroid      Heart burn     Psychiatric problem     anxiety       PSHx:   Past Surgical History:   Procedure Laterality Date    PTOSIS REPAIR Left 2/1/2017    Procedure: PTOSIS REPAIR - UPPER (POSTERIOR APPROACH);  Surgeon: Stu Malik M.D.;  Location: SURGERY SURGICAL [facility];  Service:     IMTIAZ BY LAPAROSCOPY  2014    removal lap band    GASTRIC BANDING LAPAROSCOPIC  2011    TONSILLECTOMY  1961    ABDOMINAL HYSTERECTOMY TOTAL      APPENDECTOMY CHILD      EYE SURGERY      HYSTERECTOMY, TOTAL ABDOMINAL      MAMMOPLASTY AUGMENTATION         Family history   Family History   Problem Relation Age of Onset    No Known Problems Mother     Diabetes Father     No Known Problems Sister     No Known Problems Sister     Heart Disease Brother     No Known Problems Brother     No Known Problems Brother     Heart Disease Maternal Grandmother     Thyroid Maternal Grandmother     Diabetes Paternal Grandmother     Cancer Paternal Grandfather         prostate    Diabetes Paternal Grandfather     No Known Problems Daughter     No Known Problems Daughter     No Known Problems Son        Medications:   Outpatient Medications Marked as Taking for the 1/15/25 encounter (Office Visit) with Laurel Corea M.D.   Medication Sig Dispense Refill    gabapentin (NEURONTIN) 300 MG Cap Take 1 Capsule by mouth every evening. 90 Capsule 1    hydrOXYzine HCl (ATARAX) 25 MG Tab TAKE 1 TABLET BY MOUTH EVERYDAY AT BEDTIME 90 Tablet 1    levothyroxine (SYNTHROID) 125 MCG Tab TAKE 1 TABLET BY MOUTH EVERY DAY IN THE MORNING ON AN EMPTY STOMACH 90 Tablet 0    cyclobenzaprine (FLEXERIL) 5 mg tablet Take 1 Tablet by mouth 2 times a day as needed for Mild Pain, Moderate Pain or Muscle Spasms for up to 60 doses. 60 Tablet 1    DULoxetine (CYMBALTA) 30 MG Cap DR Particles Take 1 Capsule by mouth every day. 90 Capsule 1    Tirzepatide-Weight Management 2.5 MG/0.5ML Solution Auto-injector Inject 2.5 mg under the skin every 7 days. 6 mL 0    influenza vaccine High-Dose  (FLUZONE HIGH-DOSE) 0.5 ML Suspension Prefilled Syringe injection Inject 0.5 mL into the shoulder, thigh, or buttocks. 0.5 mL 0    Zoster Vac Recomb Adjuvanted (SHINGRIX) 50 MCG/0.5ML Recon Susp Inject 0.5 mL into the shoulder, thigh, or buttocks. 0.5 mL 0    hydrochlorothiazide (MICROZIDE) 12.5 MG capsule TAKE 1 CAPSULE BY MOUTH EVERY DAY 90 Capsule 1    levothyroxine (SYNTHROID) 112 MCG Tab TAKE 1 TABLET BY MOUTH EVERY DAY IN THE MORNING ON AN EMPTY STOMACH 90 Tablet 0    metFORMIN (GLUCOPHAGE) 500 MG Tab TAKE 1 TABLET BY MOUTH TWICE A DAY WITH FOOD 180 Tablet 1    ibuprofen (MOTRIN) 600 MG Tab Take 600 mg by mouth every 6 hours as needed.          Current Outpatient Medications on File Prior to Visit   Medication Sig Dispense Refill    hydrOXYzine HCl (ATARAX) 25 MG Tab TAKE 1 TABLET BY MOUTH EVERYDAY AT BEDTIME 90 Tablet 1    levothyroxine (SYNTHROID) 125 MCG Tab TAKE 1 TABLET BY MOUTH EVERY DAY IN THE MORNING ON AN EMPTY STOMACH 90 Tablet 0    cyclobenzaprine (FLEXERIL) 5 mg tablet Take 1 Tablet by mouth 2 times a day as needed for Mild Pain, Moderate Pain or Muscle Spasms for up to 60 doses. 60 Tablet 1    DULoxetine (CYMBALTA) 30 MG Cap DR Particles Take 1 Capsule by mouth every day. 90 Capsule 1    Tirzepatide-Weight Management 2.5 MG/0.5ML Solution Auto-injector Inject 2.5 mg under the skin every 7 days. 6 mL 0    influenza vaccine High-Dose (FLUZONE HIGH-DOSE) 0.5 ML Suspension Prefilled Syringe injection Inject 0.5 mL into the shoulder, thigh, or buttocks. 0.5 mL 0    Zoster Vac Recomb Adjuvanted (SHINGRIX) 50 MCG/0.5ML Recon Susp Inject 0.5 mL into the shoulder, thigh, or buttocks. 0.5 mL 0    hydrochlorothiazide (MICROZIDE) 12.5 MG capsule TAKE 1 CAPSULE BY MOUTH EVERY DAY 90 Capsule 1    levothyroxine (SYNTHROID) 112 MCG Tab TAKE 1 TABLET BY MOUTH EVERY DAY IN THE MORNING ON AN EMPTY STOMACH 90 Tablet 0    metFORMIN (GLUCOPHAGE) 500 MG Tab TAKE 1 TABLET BY MOUTH TWICE A DAY WITH FOOD 180 Tablet 1     "ibuprofen (MOTRIN) 600 MG Tab Take 600 mg by mouth every 6 hours as needed.       No current facility-administered medications on file prior to visit.       Allergies:   Allergies   Allergen Reactions    Sulfa Drugs Swelling     Throat and rash    Sulfamethoxazole W-Trimethoprim      Other reaction(s): LIP SWELLING    Sulfamethoxazole-Trimethoprim Hives    Augmentin [Amoxicillin-Pot Clavulanate] Vomiting    Lasix [Furosemide]     Tape Swelling       Social Hx:   Social History     Socioeconomic History    Marital status:      Spouse name: Not on file    Number of children: Not on file    Years of education: Not on file    Highest education level: Not on file   Occupational History    Not on file   Tobacco Use    Smoking status: Former     Types: Cigarettes    Smokeless tobacco: Never   Vaping Use    Vaping status: Never Used   Substance and Sexual Activity    Alcohol use: Not Currently     Alcohol/week: 0.0 oz     Comment: Occasionally    Drug use: Not Currently     Types: Marijuana, Oral     Comment: cbd occ    Sexual activity: Not on file   Other Topics Concern    Not on file   Social History Narrative    Not on file     Social Drivers of Health     Financial Resource Strain: Not on file   Food Insecurity: Not on file   Transportation Needs: Not on file   Physical Activity: Not on file   Stress: Not on file   Social Connections: Not on file   Intimate Partner Violence: Not on file   Housing Stability: Not on file         EXAMINATION     Physical Exam:   Vitals: /76 (BP Location: Right arm, Patient Position: Sitting, BP Cuff Size: Adult)   Pulse 69   Temp 36.7 °C (98.1 °F) (Temporal)   Ht 1.575 m (5' 2\")   Wt 95 kg (209 lb 7 oz)   SpO2 95%       Constitutional:   Body Habitus: Body mass index is 38.31 kg/m².  Cooperation: Fully cooperates with exam  Appearance: Well-groomed, well-nourished  Respiratory:  Breathing comfortable on room air, no audible wheezing  Cardiovascular: Skin appears " well-perfused  Psychiatric: Appropriate affect  Gait: normal gait, no use of ambulatory device, nonantalgic.     Neurologic:  Strength:    Bilateral UE 5/5 in shoulder abductors, elbow extensors and flexors, wrist extensors, finger abductors, and finger flexors       MEDICAL DECISION MAKING    DATA    Labs:   Lab Results   Component Value Date/Time    SODIUM 145 08/27/2024 09:33 AM    POTASSIUM 4.3 08/27/2024 09:33 AM    CHLORIDE 109 08/27/2024 09:33 AM    CO2 25 08/27/2024 09:33 AM    GLUCOSE 116 (H) 08/27/2024 09:33 AM    BUN 16 08/27/2024 09:33 AM    CREATININE 0.65 08/27/2024 09:33 AM    BUNCREATRAT 21 04/16/2021 07:29 AM        Lab Results   Component Value Date/Time    WBC 6.4 08/27/2024 09:33 AM    RBC 5.36 08/27/2024 09:33 AM    HEMOGLOBIN 15.0 08/27/2024 09:33 AM    HEMATOCRIT 46.1 08/27/2024 09:33 AM    MCV 86.0 08/27/2024 09:33 AM    MCH 28.0 08/27/2024 09:33 AM    MCHC 32.5 08/27/2024 09:33 AM    MPV 10.5 08/27/2024 09:33 AM    NEUTSPOLYS 47.70 08/27/2024 09:33 AM    LYMPHOCYTES 38.10 08/27/2024 09:33 AM    MONOCYTES 7.20 08/27/2024 09:33 AM    EOSINOPHILS 5.80 08/27/2024 09:33 AM    BASOPHILS 0.90 08/27/2024 09:33 AM        Lab Results   Component Value Date/Time    HBA1C 5.8 (H) 08/27/2024 09:33 AM          Imaging:       I reviewed the following radiology reports                Results for orders placed during the hospital encounter of 11/23/24    MR-CERVICAL SPINE-W/O    Impression  Mild multilevel degenerative changes as above. No high-grade narrowing.          Results for orders placed during the hospital encounter of 11/23/24    MR-THORACIC SPINE-W/O    Impression  1.  Anterior bridging osteophytes at T7-T8, T8-T9, and T9-10.  2.  Otherwise unremarkable noncontrast MRI of the thoracic spine. No significant spinal canal or neuroforaminal narrowing.                                          DIAGNOSIS   Visit Diagnoses     ICD-10-CM   1. Neck pain on right side  M54.2   2. Pain of right scapula   M89.8X1   3. Plantar fasciitis, bilateral  M72.2         ASSESSMENT and PLAN:     Ashanti Arvizu is a 64 y.o. female who returns to clinic for follow-up of right scapular pain.    Ashanti was seen today for follow-up.    Diagnoses and all orders for this visit:    Neck pain on right side  -     gabapentin (NEURONTIN) 300 MG Cap; Take 1 Capsule by mouth every evening.    Pain of right scapula    Plantar fasciitis, bilateral        Assessment & Plan  1. Right scapular pain.  The patient's right scapular pain has shown significant improvement with the use of gabapentin. She reports only mild soreness and is currently managing her pain with gabapentin taken at night. She is advised to continue taking gabapentin at night and to try skipping it for a few nights to assess its effectiveness. If no difference is noted, she may discontinue its use. She is also planning to follow up with physical therapy in order to schedule another session or two to get a good home exercise program.    2. Plantar fasciitis.  The patient reports discomfort in her feet consistent with plantar fasciitis. She is advised to perform heel stretches and strengthening exercises, such as standing on a stair and lifting the heels up and down. Using a tennis ball or lacrosse ball to massage the foot may also be beneficial. If there is no improvement in her symptoms, a referral to a podiatrist will be considered.        Follow up: 2 months    Thank you for allowing me to participate in the care of this patient. If you have any questions please not hesitate to contact me.         Please note that this dictation was created using voice recognition software. I have made every reasonable attempt to correct obvious errors but there may be errors of grammar and content that I may have overlooked prior to finalization of this note.    Laurel Corea MD  Interventional Spine and Sports Physiatry  Physical Medicine and Rehabilitation  [facility]

## 2025-01-15 ENCOUNTER — OFFICE VISIT (OUTPATIENT)
Dept: PHYSICAL MEDICINE AND REHAB | Facility: MEDICAL CENTER | Age: 65
End: 2025-01-15
Payer: COMMERCIAL

## 2025-01-15 VITALS
TEMPERATURE: 98.1 F | BODY MASS INDEX: 38.54 KG/M2 | OXYGEN SATURATION: 95 % | WEIGHT: 209.44 LBS | DIASTOLIC BLOOD PRESSURE: 76 MMHG | HEIGHT: 62 IN | HEART RATE: 69 BPM | SYSTOLIC BLOOD PRESSURE: 121 MMHG

## 2025-01-15 DIAGNOSIS — M89.8X1 PAIN OF RIGHT SCAPULA: ICD-10-CM

## 2025-01-15 DIAGNOSIS — M72.2 PLANTAR FASCIITIS, BILATERAL: ICD-10-CM

## 2025-01-15 DIAGNOSIS — M54.2 NECK PAIN ON RIGHT SIDE: Primary | ICD-10-CM

## 2025-01-15 PROCEDURE — 3074F SYST BP LT 130 MM HG: CPT | Performed by: STUDENT IN AN ORGANIZED HEALTH CARE EDUCATION/TRAINING PROGRAM

## 2025-01-15 PROCEDURE — 1125F AMNT PAIN NOTED PAIN PRSNT: CPT | Performed by: STUDENT IN AN ORGANIZED HEALTH CARE EDUCATION/TRAINING PROGRAM

## 2025-01-15 PROCEDURE — 99214 OFFICE O/P EST MOD 30 MIN: CPT | Performed by: STUDENT IN AN ORGANIZED HEALTH CARE EDUCATION/TRAINING PROGRAM

## 2025-01-15 PROCEDURE — 3078F DIAST BP <80 MM HG: CPT | Performed by: STUDENT IN AN ORGANIZED HEALTH CARE EDUCATION/TRAINING PROGRAM

## 2025-01-15 RX ORDER — GABAPENTIN 300 MG/1
300 CAPSULE ORAL NIGHTLY
Qty: 90 CAPSULE | Refills: 1 | Status: SHIPPED | OUTPATIENT
Start: 2025-01-15

## 2025-01-15 ASSESSMENT — PAIN SCALES - GENERAL: PAINLEVEL_OUTOF10: 1=MINIMAL PAIN

## 2025-01-15 ASSESSMENT — PATIENT HEALTH QUESTIONNAIRE - PHQ9: CLINICAL INTERPRETATION OF PHQ2 SCORE: 0

## 2025-01-15 ASSESSMENT — FIBROSIS 4 INDEX: FIB4 SCORE: 1.66

## 2025-01-17 ENCOUNTER — PHARMACY VISIT (OUTPATIENT)
Dept: PHARMACY | Facility: MEDICAL CENTER | Age: 65
End: 2025-01-17
Payer: COMMERCIAL

## 2025-01-17 PROCEDURE — RXMED WILLOW AMBULATORY MEDICATION CHARGE: Performed by: INTERNAL MEDICINE

## 2025-01-17 RX ORDER — ZOSTER VACCINE RECOMBINANT, ADJUVANTED 50 MCG/0.5
0.5 KIT INTRAMUSCULAR
Qty: 0.5 ML | Refills: 0 | OUTPATIENT
Start: 2025-01-17

## 2025-01-26 DIAGNOSIS — M79.10 MYALGIA: ICD-10-CM

## 2025-01-26 DIAGNOSIS — M54.2 NECK PAIN ON RIGHT SIDE: ICD-10-CM

## 2025-01-26 DIAGNOSIS — M54.9 UPPER BACK PAIN ON RIGHT SIDE: ICD-10-CM

## 2025-01-27 RX ORDER — CYCLOBENZAPRINE HCL 5 MG
5 TABLET ORAL 2 TIMES DAILY PRN
Qty: 60 TABLET | Refills: 1 | Status: SHIPPED | OUTPATIENT
Start: 2025-01-27

## 2025-01-27 NOTE — TELEPHONE ENCOUNTER
Was the patient seen in the last year in this department? Yes    Does patient have an active prescription for medications requested? Yes     Do they have a refill on file? No     If a controlled substance, is a new  on file? No     Received Request Via: Pharmacy    If pharmacy requests, is the patient still taking the medication or medication discontinued? Yes

## 2025-02-11 ENCOUNTER — APPOINTMENT (OUTPATIENT)
Dept: PHYSICAL THERAPY | Facility: MEDICAL CENTER | Age: 65
End: 2025-02-11
Attending: STUDENT IN AN ORGANIZED HEALTH CARE EDUCATION/TRAINING PROGRAM
Payer: COMMERCIAL

## 2025-02-14 ENCOUNTER — APPOINTMENT (OUTPATIENT)
Dept: PHYSICAL THERAPY | Facility: MEDICAL CENTER | Age: 65
End: 2025-02-14
Attending: PHYSICIAN ASSISTANT
Payer: COMMERCIAL

## 2025-02-21 DIAGNOSIS — R73.03 PREDIABETES: ICD-10-CM

## 2025-02-25 ENCOUNTER — OFFICE VISIT (OUTPATIENT)
Dept: MEDICAL GROUP | Facility: LAB | Age: 65
End: 2025-02-25
Payer: COMMERCIAL

## 2025-02-25 ENCOUNTER — TELEPHONE (OUTPATIENT)
Dept: MEDICAL GROUP | Facility: LAB | Age: 65
End: 2025-02-25

## 2025-02-25 ENCOUNTER — TELEPHONE (OUTPATIENT)
Dept: MEDICAL GROUP | Facility: LAB | Age: 65
End: 2025-02-25
Payer: COMMERCIAL

## 2025-02-25 VITALS
TEMPERATURE: 97.4 F | OXYGEN SATURATION: 96 % | BODY MASS INDEX: 39.6 KG/M2 | SYSTOLIC BLOOD PRESSURE: 120 MMHG | RESPIRATION RATE: 16 BRPM | WEIGHT: 215.17 LBS | HEIGHT: 62 IN | DIASTOLIC BLOOD PRESSURE: 62 MMHG | HEART RATE: 70 BPM

## 2025-02-25 DIAGNOSIS — U07.1 COVID-19: ICD-10-CM

## 2025-02-25 PROCEDURE — 99213 OFFICE O/P EST LOW 20 MIN: CPT | Performed by: PHYSICIAN ASSISTANT

## 2025-02-25 PROCEDURE — 3078F DIAST BP <80 MM HG: CPT | Performed by: PHYSICIAN ASSISTANT

## 2025-02-25 PROCEDURE — 3074F SYST BP LT 130 MM HG: CPT | Performed by: PHYSICIAN ASSISTANT

## 2025-02-25 RX ORDER — ALBUTEROL SULFATE 90 UG/1
2 INHALANT RESPIRATORY (INHALATION) EVERY 4 HOURS PRN
Qty: 1 EACH | Refills: 0 | Status: SHIPPED | OUTPATIENT
Start: 2025-02-25

## 2025-02-25 RX ORDER — BENZONATATE 100 MG/1
100 CAPSULE ORAL 3 TIMES DAILY PRN
Qty: 21 CAPSULE | Refills: 0 | Status: SHIPPED | OUTPATIENT
Start: 2025-02-25 | End: 2025-03-04

## 2025-02-25 ASSESSMENT — FIBROSIS 4 INDEX: FIB4 SCORE: 1.66

## 2025-02-25 NOTE — TELEPHONE ENCOUNTER
Advised anjelica per pcp comments and she will be here for appointment later today  ----- Message from Physician Assistant Rima Zuleta P.A.-C. sent at 2/25/2025 10:16 AM PST -----   Isolation until  24 hours after resolution of fever (without antipyretic) AND improvement of symptoms. Over-the-counter cough and cold medicines are reasonable to take.  Also advise to continue to monitor for fever and strongly encourage increased fluid intake.  Suggest evaluation in urgent care or ER if patient develops fever that cannot be controlled with medication, shortness of breath at rest, chest pain.  ----- Message -----  From: Lea Parsons, Med Ass't  Sent: 2/25/2025   9:29 AM PST  To: Rima Zuleta P.A.-C.    VOICEMAIL  1. Caller Name: Ashanti                      Call Back Number: [phone] (home) [phone] (work)      2. Message: LVM stating she has covid tested positive today wants to know what she can take     3. Patient approves office to leave a detailed voicemail/MyChart message: N\A

## 2025-02-25 NOTE — TELEPHONE ENCOUNTER
VOICEMAIL  1. Caller Name: Ashanti                      Call Back Number: [phone] (home) [phone] (work)     2. Message: LVM stating she has covid tested positive today wants to know what she can take    3. Patient approves office to leave a detailed voicemail/MyChart message: N\A

## 2025-02-25 NOTE — PROGRESS NOTES
Subjective:     CC: Covid-19    HPI:   Ashanti here today with   Exposure: Friday, at work  Symptoms: Cough, sinus drainage, fatigue, body aches, subjective fever tested positive at home  Vaccinated?  Most recent COVID-vaccine 04/23/2021      ROS:  All ROS negative except for pertinent positives listed above.     Current Outpatient Medications Ordered in Epic   Medication Sig Dispense Refill    Nirmatrelvir&Ritonavir 300/100 20 x 150 MG & 10 x 100MG Tablet Therapy Pack Take 300 mg nirmatrelvir (two 150 mg tablets) with 100 mg ritonavir (one 100 mg tablet) by mouth, with all three tablets taken together twice daily for 5 days. 30 Each 0    albuterol 108 (90 Base) MCG/ACT Aero Soln inhalation aerosol Inhale 2 Puffs every four hours as needed for Shortness of Breath. 1 Each 0    benzonatate (TESSALON) 100 MG Cap Take 1 Capsule by mouth 3 times a day as needed for Cough for up to 7 days. 21 Capsule 0    metFORMIN (GLUCOPHAGE) 500 MG Tab TAKE 1 TABLET BY MOUTH TWICE A DAY WITH FOOD 180 Tablet 1    cyclobenzaprine (FLEXERIL) 5 mg tablet TAKE 1 TABLET BY MOUTH 2 TIMES A DAY AS NEEDED FOR MILD PAIN, MODERATE PAIN OR MUSCLE SPASMS FOR UP TO 60 DOSES. 60 Tablet 1    gabapentin (NEURONTIN) 300 MG Cap Take 1 Capsule by mouth every evening. 90 Capsule 1    hydrOXYzine HCl (ATARAX) 25 MG Tab TAKE 1 TABLET BY MOUTH EVERYDAY AT BEDTIME 90 Tablet 1    levothyroxine (SYNTHROID) 125 MCG Tab TAKE 1 TABLET BY MOUTH EVERY DAY IN THE MORNING ON AN EMPTY STOMACH 90 Tablet 0    DULoxetine (CYMBALTA) 30 MG Cap DR Particles Take 1 Capsule by mouth every day. 90 Capsule 1    Tirzepatide-Weight Management 2.5 MG/0.5ML Solution Auto-injector Inject 2.5 mg under the skin every 7 days. 6 mL 0    hydrochlorothiazide (MICROZIDE) 12.5 MG capsule TAKE 1 CAPSULE BY MOUTH EVERY DAY 90 Capsule 1    levothyroxine (SYNTHROID) 112 MCG Tab TAKE 1 TABLET BY MOUTH EVERY DAY IN THE MORNING ON AN EMPTY STOMACH 90 Tablet 0    ibuprofen (MOTRIN) 600 MG Tab Take 600  "mg by mouth every 6 hours as needed.      Zoster Vac Recomb Adjuvanted (SHINGRIX) 50 MCG/0.5ML Recon Susp Inject 0.5 mL into the shoulder, thigh, or buttocks. (Patient not taking: Reported on 2/25/2025) 0.5 mL 0    influenza vaccine High-Dose (FLUZONE HIGH-DOSE) 0.5 ML Suspension Prefilled Syringe injection Inject 0.5 mL into the shoulder, thigh, or buttocks. (Patient not taking: Reported on 2/25/2025) 0.5 mL 0     No current Epic-ordered facility-administered medications on file.         Objective:     Exam:  /62 (BP Location: Left arm, Patient Position: Sitting, BP Cuff Size: Adult)   Pulse 70   Temp 36.3 °C (97.4 °F) (Temporal)   Resp 16   Ht 1.575 m (5' 2\")   Wt 97.6 kg (215 lb 2.7 oz)   SpO2 96%   BMI 39.35 kg/m²  Body mass index is 39.35 kg/m².    Gen: Alert and oriented, No apparent distress.  HEENT: pupils PERRLA, The pinna, tragus, and ear canal are non-tender and without swelling. The ear canal is clear without discharge. The tympanic membrane is normal in appearance with a good cone of light. Oral mucosa is pink and moist with good dentition. Tongue normal in appearance without lesions and with good symmetrical movement. No buccal nodules or lesions are noted. The pharynx is normal in appearance without tonsillar swelling or exudates.   Neck: Neck is supple without lymphadenopathy.  Lungs: Normal effort, CTA bilaterally, no wheezes, rhonchi, or rales  CV: Regular rate and rhythm. No murmurs, rubs, or gallops.  Ext: No clubbing, cyanosis, edema.        Assessment & Plan:     64 y.o. female with the following -     1. COVID-19  Counseling/Patient Education:--   Symptomatic: Isolation until 24 hours after resolution of fever (without antipyretic) AND improvement of symptoms  Reviewed expected course/prognosis of COVID-19-- Advised wearing a mask in public-- Encouraged hand hygiene  Continue OTC treatments, prescription of Paxil is also sent into pharmacy on file  Red Flags discussed. I conducted " the encounter wearing the following PPE: N95, The patient was wearing a mask.      I spent a total of 12 minutes with record review, exam, communication with the patient, communication with other providers, and documentation of this encounter.      No follow-ups on file.    Please note that this dictation was created using voice recognition software. I have made every reasonable attempt to correct obvious errors, but there may be errors of grammar and possibly content that I did not discover before finalizing the note.

## 2025-02-25 NOTE — TELEPHONE ENCOUNTER
----- Message from Physician Assistant Rima Zuleta P.A.-C. sent at 2/25/2025 10:16 AM PST -----   Isolation until  24 hours after resolution of fever (without antipyretic) AND improvement of symptoms. Over-the-counter cough and cold medicines are reasonable to take.  Also advise to continue to monitor for fever and strongly encourage increased fluid intake.  Suggest evaluation in urgent care or ER if patient develops fever that cannot be controlled with medication, shortness of breath at rest, chest pain.  ----- Message -----  From: Lea Parsons, Med Ass't  Sent: 2/25/2025   9:29 AM PST  To: Rima Zuleta P.A.-C.    VOICEMAIL  1. Caller Name: Ashanti                      Call Back Number: [phone] (home) [phone] (work)      2. Message: LVM stating she has covid tested positive today wants to know what she can take     3. Patient approves office to leave a detailed voicemail/MyChart message: N\A

## 2025-02-28 ENCOUNTER — APPOINTMENT (OUTPATIENT)
Dept: PHYSICAL THERAPY | Facility: MEDICAL CENTER | Age: 65
End: 2025-02-28
Attending: PHYSICIAN ASSISTANT
Payer: COMMERCIAL

## 2025-03-12 DIAGNOSIS — E89.0 POSTABLATIVE HYPOTHYROIDISM: ICD-10-CM

## 2025-03-12 RX ORDER — LEVOTHYROXINE SODIUM 112 UG/1
112 TABLET ORAL
Qty: 90 TABLET | Refills: 0 | Status: SHIPPED | OUTPATIENT
Start: 2025-03-12

## 2025-03-12 NOTE — PROGRESS NOTES
Verbal consent was acquired by the patient to use [employer] ambient listening note generation during this visit Yes     FOLLOW-UP PATIENT VISIT    Interventional Spine and Pain  Physiatry (Physical Medicine and Rehabilitation)     Date of Service: 25   Chief Complaint:   Chief Complaint   Patient presents with    Follow-Up     Back pain      Patient Name: Ashanti Arvizu   : 1960   MRN: [id number]       PRIOR HISTORY    Please see new patient note by Dr. Corea for more details.     Interval History  Patient identification: Ashanti Arvizu,  1960, with history of prediabetes, TIA, anxiety, who presents today for follow-up of back pain.     History of Present Illness  The patient is a 64-year-old female who presents today for follow-up of back pain.    She reports that her back pain has been relatively stable over the past few months, and continues to be intermittent but not severe and overall well-managed on current gabapentin, Flexeril, and ibuprofen. She experienced a right leg sciatic episode approximately 2 weeks ago, which was managed with patches and resulted in symptom improvement. She also mentions a period of bed rest due to COVID-19 infection, which she completed last week.     She describes a sensation akin to needles in her left toes, specifically the ring and middle toes. This sensation has been intermittent in the past but has recently become more constant. She reports no associated leg weakness or falls. She has been trying stretching exercises which do not seem to help, and continues to take gabapentin 300 mg once at night. She denies any symptoms in the right toes.    Additionally, she reports numbness on the anterolateral right thigh, a symptom she first noticed 2 weeks ago. She recalls a similar episode 7 years ago, which was more of a shooting pain down the lateral thigh rather than numbness, severe enough to induce crying and was managed with a nerve injection. She expresses concern  about the potential recurrence of this severe pain.    MEDICATIONS  gabapentin      PMHx:   Past Medical History:   Diagnosis Date    Disorder of thyroid     Heart burn     Psychiatric problem     anxiety       PSHx:   Past Surgical History:   Procedure Laterality Date    PTOSIS REPAIR Left 2/1/2017    Procedure: PTOSIS REPAIR - UPPER (POSTERIOR APPROACH);  Surgeon: Stu Malik M.D.;  Location: [facility];  Service:     IMTIAZ BY LAPAROSCOPY  2014    removal lap band    GASTRIC BANDING LAPAROSCOPIC  2011    TONSILLECTOMY  1961    ABDOMINAL HYSTERECTOMY TOTAL      APPENDECTOMY CHILD      EYE SURGERY      HYSTERECTOMY, TOTAL ABDOMINAL      MAMMOPLASTY AUGMENTATION         Family history   Family History   Problem Relation Age of Onset    No Known Problems Mother     Diabetes Father     No Known Problems Sister     No Known Problems Sister     Heart Disease Brother     No Known Problems Brother     No Known Problems Brother     Heart Disease Maternal Grandmother     Thyroid Maternal Grandmother     Diabetes Paternal Grandmother     Cancer Paternal Grandfather         prostate    Diabetes Paternal Grandfather     No Known Problems Daughter     No Known Problems Daughter     No Known Problems Son        Medications:   Outpatient Medications Marked as Taking for the 3/14/25 encounter (Office Visit) with Laurel Corea M.D.   Medication Sig Dispense Refill    levothyroxine (SYNTHROID) 112 MCG Tab TAKE 1 TABLET BY MOUTH EVERY DAY IN THE MORNING ON AN EMPTY STOMACH 90 Tablet 0    Nirmatrelvir&Ritonavir 300/100 20 x 150 MG & 10 x 100MG Tablet Therapy Pack Take 300 mg nirmatrelvir (two 150 mg tablets) with 100 mg ritonavir (one 100 mg tablet) by mouth, with all three tablets taken together twice daily for 5 days. 30 Each 0    albuterol 108 (90 Base) MCG/ACT Aero Soln inhalation aerosol Inhale 2 Puffs every four hours as needed for Shortness of Breath. 1 Each 0    metFORMIN (GLUCOPHAGE) 500 MG Tab TAKE 1  TABLET BY MOUTH TWICE A DAY WITH FOOD 180 Tablet 1    cyclobenzaprine (FLEXERIL) 5 mg tablet TAKE 1 TABLET BY MOUTH 2 TIMES A DAY AS NEEDED FOR MILD PAIN, MODERATE PAIN OR MUSCLE SPASMS FOR UP TO 60 DOSES. 60 Tablet 1    gabapentin (NEURONTIN) 300 MG Cap Take 1 Capsule by mouth every evening. 90 Capsule 1    hydrOXYzine HCl (ATARAX) 25 MG Tab TAKE 1 TABLET BY MOUTH EVERYDAY AT BEDTIME 90 Tablet 1    levothyroxine (SYNTHROID) 125 MCG Tab TAKE 1 TABLET BY MOUTH EVERY DAY IN THE MORNING ON AN EMPTY STOMACH 90 Tablet 0    DULoxetine (CYMBALTA) 30 MG Cap DR Particles Take 1 Capsule by mouth every day. 90 Capsule 1    Tirzepatide-Weight Management 2.5 MG/0.5ML Solution Auto-injector Inject 2.5 mg under the skin every 7 days. 6 mL 0    hydrochlorothiazide (MICROZIDE) 12.5 MG capsule TAKE 1 CAPSULE BY MOUTH EVERY DAY 90 Capsule 1    ibuprofen (MOTRIN) 600 MG Tab Take 600 mg by mouth every 6 hours as needed.          Current Outpatient Medications on File Prior to Visit   Medication Sig Dispense Refill    levothyroxine (SYNTHROID) 112 MCG Tab TAKE 1 TABLET BY MOUTH EVERY DAY IN THE MORNING ON AN EMPTY STOMACH 90 Tablet 0    Nirmatrelvir&Ritonavir 300/100 20 x 150 MG & 10 x 100MG Tablet Therapy Pack Take 300 mg nirmatrelvir (two 150 mg tablets) with 100 mg ritonavir (one 100 mg tablet) by mouth, with all three tablets taken together twice daily for 5 days. 30 Each 0    albuterol 108 (90 Base) MCG/ACT Aero Soln inhalation aerosol Inhale 2 Puffs every four hours as needed for Shortness of Breath. 1 Each 0    metFORMIN (GLUCOPHAGE) 500 MG Tab TAKE 1 TABLET BY MOUTH TWICE A DAY WITH FOOD 180 Tablet 1    cyclobenzaprine (FLEXERIL) 5 mg tablet TAKE 1 TABLET BY MOUTH 2 TIMES A DAY AS NEEDED FOR MILD PAIN, MODERATE PAIN OR MUSCLE SPASMS FOR UP TO 60 DOSES. 60 Tablet 1    gabapentin (NEURONTIN) 300 MG Cap Take 1 Capsule by mouth every evening. 90 Capsule 1    hydrOXYzine HCl (ATARAX) 25 MG Tab TAKE 1 TABLET BY MOUTH EVERYDAY AT  BEDTIME 90 Tablet 1    levothyroxine (SYNTHROID) 125 MCG Tab TAKE 1 TABLET BY MOUTH EVERY DAY IN THE MORNING ON AN EMPTY STOMACH 90 Tablet 0    DULoxetine (CYMBALTA) 30 MG Cap DR Particles Take 1 Capsule by mouth every day. 90 Capsule 1    Tirzepatide-Weight Management 2.5 MG/0.5ML Solution Auto-injector Inject 2.5 mg under the skin every 7 days. 6 mL 0    hydrochlorothiazide (MICROZIDE) 12.5 MG capsule TAKE 1 CAPSULE BY MOUTH EVERY DAY 90 Capsule 1    ibuprofen (MOTRIN) 600 MG Tab Take 600 mg by mouth every 6 hours as needed.      Zoster Vac Recomb Adjuvanted (SHINGRIX) 50 MCG/0.5ML Recon Susp Inject 0.5 mL into the shoulder, thigh, or buttocks. (Patient not taking: Reported on 3/14/2025) 0.5 mL 0    influenza vaccine High-Dose (FLUZONE HIGH-DOSE) 0.5 ML Suspension Prefilled Syringe injection Inject 0.5 mL into the shoulder, thigh, or buttocks. (Patient not taking: Reported on 3/14/2025) 0.5 mL 0     No current facility-administered medications on file prior to visit.       Allergies:   Allergies   Allergen Reactions    Sulfa Drugs Swelling     Throat and rash    Sulfamethoxazole W-Trimethoprim      Other reaction(s): LIP SWELLING    Sulfamethoxazole-Trimethoprim Hives    Augmentin [Amoxicillin-Pot Clavulanate] Vomiting    Lasix [Furosemide]     Tape Swelling       Social Hx:   Social History     Socioeconomic History    Marital status:      Spouse name: Not on file    Number of children: Not on file    Years of education: Not on file    Highest education level: Not on file   Occupational History    Not on file   Tobacco Use    Smoking status: Former     Types: Cigarettes    Smokeless tobacco: Never   Vaping Use    Vaping status: Never Used   Substance and Sexual Activity    Alcohol use: Not Currently     Alcohol/week: 0.0 oz     Comment: Occasionally    Drug use: Not Currently     Types: Marijuana, Oral     Comment: cbd occ    Sexual activity: Not on file   Other Topics Concern    Not on file   Social  "History Narrative    Not on file     Social Drivers of Health     Financial Resource Strain: Not on file   Food Insecurity: Not on file   Transportation Needs: Not on file   Physical Activity: Not on file   Stress: Not on file   Social Connections: Not on file   Intimate Partner Violence: Not on file   Housing Stability: Not on file         EXAMINATION     Physical Exam:   Vitals: /75 (BP Location: Left arm, Patient Position: Sitting, BP Cuff Size: Adult)   Pulse 77   Temp 37.2 °C (98.9 °F) (Temporal)   Ht 1.575 m (5' 2\")   Wt 97.1 kg (214 lb 1.1 oz)   SpO2 95%       Constitutional:   Body Habitus: Body mass index is 39.15 kg/m².  Cooperation: Fully cooperates with exam  Appearance: Well-groomed, well-nourished  Respiratory:  Breathing comfortable on room air, no audible wheezing  Cardiovascular: Skin appears well-perfused  Psychiatric: Appropriate affect  Gait: normal gait, no use of ambulatory device, nonantalgic.     Sensation: decreased sensation right anterolateral thigh, left ring and middle toes      MEDICAL DECISION MAKING    DATA    Labs:   Lab Results   Component Value Date/Time    SODIUM 145 08/27/2024 09:33 AM    POTASSIUM 4.3 08/27/2024 09:33 AM    CHLORIDE 109 08/27/2024 09:33 AM    CO2 25 08/27/2024 09:33 AM    GLUCOSE 116 (H) 08/27/2024 09:33 AM    BUN 16 08/27/2024 09:33 AM    CREATININE 0.65 08/27/2024 09:33 AM    BUNCREATRAT 21 04/16/2021 07:29 AM        Lab Results   Component Value Date/Time    WBC 6.4 08/27/2024 09:33 AM    RBC 5.36 08/27/2024 09:33 AM    HEMOGLOBIN 15.0 08/27/2024 09:33 AM    HEMATOCRIT 46.1 08/27/2024 09:33 AM    MCV 86.0 08/27/2024 09:33 AM    MCH 28.0 08/27/2024 09:33 AM    MCHC 32.5 08/27/2024 09:33 AM    MPV 10.5 08/27/2024 09:33 AM    NEUTSPOLYS 47.70 08/27/2024 09:33 AM    LYMPHOCYTES 38.10 08/27/2024 09:33 AM    MONOCYTES 7.20 08/27/2024 09:33 AM    EOSINOPHILS 5.80 08/27/2024 09:33 AM    BASOPHILS 0.90 08/27/2024 09:33 AM        Lab Results   Component " Value Date/Time    HBA1C 5.8 (H) 08/27/2024 09:33 AM          Imaging:   I reviewed the following radiology reports                Results for orders placed during the hospital encounter of 11/23/24     MR-CERVICAL SPINE-W/O     Impression  Mild multilevel degenerative changes as above. No high-grade narrowing.          Results for orders placed during the hospital encounter of 11/23/24     MR-THORACIC SPINE-W/O     Impression  1.  Anterior bridging osteophytes at T7-T8, T8-T9, and T9-10.  2.  Otherwise unremarkable noncontrast MRI of the thoracic spine. No significant spinal canal or neuroforaminal narrowing.    DIAGNOSIS   Visit Diagnoses     ICD-10-CM   1. Toe pain, left  M79.675   2. Right leg numbness  R20.0         ASSESSMENT and PLAN:     Ashanti Arvizu is a 64 y.o. female who returns to clinic for follow-up of left toe pain and right thigh numbness.    Ashanti was seen today for follow-up.    Diagnoses and all orders for this visit:    Toe pain, left    Right leg numbness        Assessment & Plan  Mid back pain  Her back pain has been relatively stable over the past few months. She experienced a sciatic episode approximately 2 weeks ago, which was managed with patches and resulted in symptom improvement. Pain continues to be intermittent and manageable and we discussed that should this worsen in the future we can consider trigger point injections.    Left toe pain  She reports worsening neuropathic type pain in the left middle and ring toes described as needle-like. The etiology of the pain could be attributed to either localized nerve irritation or peripheral neuropathy. The localized nature of the discomfort to two specific toes suggests a possible small nerve irritation. She is advised to apply lidocaine cream 4% to the affected areas twice daily for a duration of 2 weeks, and continue current dosage of gabapentin 300 mg nightly. If there is no improvement in her condition after 2 weeks, an initiation of a  daytime gabapentin dosage will be considered as well as possible nerve conduction testing.    Right thigh numbness  She reports several weeks of numbness in the right anterolateral thigh. The numbness could be due to pressure on the lateral femoral cutaneous nerve, possibly from tight waistbands or abdominal pressure, consistent with meralgia paresthetica. Given prior nerve pain in the same region several years ago, it is also possible that this is consistent with lumbar radiculopathy. She is advised to apply lidocaine cream 4% to the affected area for 2 weeks. If there is no improvement, an injection targeting the lateral femoral cutaneous nerve may be considered.        Follow up: In 2 weeks    Thank you for allowing me to participate in the care of this patient. If you have any questions please not hesitate to contact me.         Please note that this dictation was created using voice recognition software. I have made every reasonable attempt to correct obvious errors but there may be errors of grammar and content that I may have overlooked prior to finalization of this note.    Laurel Corea MD  Interventional Spine and Sports Physiatry  Physical Medicine and Rehabilitation  [facility]

## 2025-03-14 ENCOUNTER — OFFICE VISIT (OUTPATIENT)
Dept: PHYSICAL MEDICINE AND REHAB | Facility: MEDICAL CENTER | Age: 65
End: 2025-03-14
Payer: COMMERCIAL

## 2025-03-14 VITALS
DIASTOLIC BLOOD PRESSURE: 75 MMHG | BODY MASS INDEX: 39.39 KG/M2 | WEIGHT: 214.07 LBS | TEMPERATURE: 98.9 F | OXYGEN SATURATION: 95 % | HEART RATE: 77 BPM | SYSTOLIC BLOOD PRESSURE: 139 MMHG | HEIGHT: 62 IN

## 2025-03-14 DIAGNOSIS — M54.9 MID BACK PAIN: Primary | ICD-10-CM

## 2025-03-14 DIAGNOSIS — R20.0 RIGHT LEG NUMBNESS: ICD-10-CM

## 2025-03-14 DIAGNOSIS — M79.675 TOE PAIN, LEFT: ICD-10-CM

## 2025-03-14 PROCEDURE — 3078F DIAST BP <80 MM HG: CPT | Performed by: STUDENT IN AN ORGANIZED HEALTH CARE EDUCATION/TRAINING PROGRAM

## 2025-03-14 PROCEDURE — 1126F AMNT PAIN NOTED NONE PRSNT: CPT | Performed by: STUDENT IN AN ORGANIZED HEALTH CARE EDUCATION/TRAINING PROGRAM

## 2025-03-14 PROCEDURE — 3075F SYST BP GE 130 - 139MM HG: CPT | Performed by: STUDENT IN AN ORGANIZED HEALTH CARE EDUCATION/TRAINING PROGRAM

## 2025-03-14 PROCEDURE — 99214 OFFICE O/P EST MOD 30 MIN: CPT | Performed by: STUDENT IN AN ORGANIZED HEALTH CARE EDUCATION/TRAINING PROGRAM

## 2025-03-14 ASSESSMENT — PAIN SCALES - GENERAL: PAINLEVEL_OUTOF10: NO PAIN

## 2025-03-14 ASSESSMENT — PATIENT HEALTH QUESTIONNAIRE - PHQ9: CLINICAL INTERPRETATION OF PHQ2 SCORE: 0

## 2025-03-14 ASSESSMENT — FIBROSIS 4 INDEX: FIB4 SCORE: 1.66

## 2025-03-26 NOTE — PROGRESS NOTES
Verbal consent was acquired by the patient to use [employer] ambient listening note generation during this visit Yes     FOLLOW-UP PATIENT VISIT    Interventional Spine and Pain  Physiatry (Physical Medicine and Rehabilitation)     Date of Service: 25   Chief Complaint: No chief complaint on file.     Patient Name: Ashanti Arvizu   : 1960   MRN: [id number]       PRIOR HISTORY    Please see new patient note by Dr. Corea for more details.     Interval History  Patient identification: Ashanti Arvizu,  1960, with history of prediabetes, TIA, anxiety, who presents today for follow-up of back pain and left toe and right thigh numbness.     History of Present Illness         Red Flags ROS: ***  Fever, Chills, Sweats: Denies  Involuntary Weight Loss: Denies  Bladder Incontinence: Denies  Bowel Incontinence: Denies  Saddle Anesthesia: Denies      PMHx:   Past Medical History:   Diagnosis Date    Disorder of thyroid     Heart burn     Psychiatric problem     anxiety       PSHx:   Past Surgical History:   Procedure Laterality Date    PTOSIS REPAIR Left 2017    Procedure: PTOSIS REPAIR - UPPER (POSTERIOR APPROACH);  Surgeon: Stu Malik M.D.;  Location: SURGERY [facility];  Service:     IMTIAZ BY LAPAROSCOPY      removal lap band    GASTRIC BANDING LAPAROSCOPIC      TONSILLECTOMY      ABDOMINAL HYSTERECTOMY TOTAL      APPENDECTOMY CHILD      EYE SURGERY      HYSTERECTOMY, TOTAL ABDOMINAL      MAMMOPLASTY AUGMENTATION         Family history   Family History   Problem Relation Age of Onset    No Known Problems Mother     Diabetes Father     No Known Problems Sister     No Known Problems Sister     Heart Disease Brother     No Known Problems Brother     No Known Problems Brother     Heart Disease Maternal Grandmother     Thyroid Maternal Grandmother     Diabetes Paternal Grandmother     Cancer Paternal Grandfather         prostate    Diabetes Paternal Grandfather     No Known Problems Daughter      No Known Problems Daughter     No Known Problems Son        Medications:   No outpatient medications have been marked as taking for the 4/2/25 encounter (Appointment) with Laurel Corea M.D..        Current Outpatient Medications on File Prior to Visit   Medication Sig Dispense Refill    levothyroxine (SYNTHROID) 112 MCG Tab TAKE 1 TABLET BY MOUTH EVERY DAY IN THE MORNING ON AN EMPTY STOMACH 90 Tablet 0    Nirmatrelvir&Ritonavir 300/100 20 x 150 MG & 10 x 100MG Tablet Therapy Pack Take 300 mg nirmatrelvir (two 150 mg tablets) with 100 mg ritonavir (one 100 mg tablet) by mouth, with all three tablets taken together twice daily for 5 days. 30 Each 0    albuterol 108 (90 Base) MCG/ACT Aero Soln inhalation aerosol Inhale 2 Puffs every four hours as needed for Shortness of Breath. 1 Each 0    metFORMIN (GLUCOPHAGE) 500 MG Tab TAKE 1 TABLET BY MOUTH TWICE A DAY WITH FOOD 180 Tablet 1    cyclobenzaprine (FLEXERIL) 5 mg tablet TAKE 1 TABLET BY MOUTH 2 TIMES A DAY AS NEEDED FOR MILD PAIN, MODERATE PAIN OR MUSCLE SPASMS FOR UP TO 60 DOSES. 60 Tablet 1    Zoster Vac Recomb Adjuvanted (SHINGRIX) 50 MCG/0.5ML Recon Susp Inject 0.5 mL into the shoulder, thigh, or buttocks. (Patient not taking: Reported on 3/14/2025) 0.5 mL 0    gabapentin (NEURONTIN) 300 MG Cap Take 1 Capsule by mouth every evening. 90 Capsule 1    hydrOXYzine HCl (ATARAX) 25 MG Tab TAKE 1 TABLET BY MOUTH EVERYDAY AT BEDTIME 90 Tablet 1    levothyroxine (SYNTHROID) 125 MCG Tab TAKE 1 TABLET BY MOUTH EVERY DAY IN THE MORNING ON AN EMPTY STOMACH 90 Tablet 0    DULoxetine (CYMBALTA) 30 MG Cap DR Particles Take 1 Capsule by mouth every day. 90 Capsule 1    Tirzepatide-Weight Management 2.5 MG/0.5ML Solution Auto-injector Inject 2.5 mg under the skin every 7 days. 6 mL 0    influenza vaccine High-Dose (FLUZONE HIGH-DOSE) 0.5 ML Suspension Prefilled Syringe injection Inject 0.5 mL into the shoulder, thigh, or buttocks. (Patient not taking: Reported on 3/14/2025)  0.5 mL 0    hydrochlorothiazide (MICROZIDE) 12.5 MG capsule TAKE 1 CAPSULE BY MOUTH EVERY DAY 90 Capsule 1    ibuprofen (MOTRIN) 600 MG Tab Take 600 mg by mouth every 6 hours as needed.       No current facility-administered medications on file prior to visit.       Allergies:   Allergies   Allergen Reactions    Sulfa Drugs Swelling     Throat and rash    Sulfamethoxazole W-Trimethoprim      Other reaction(s): LIP SWELLING    Sulfamethoxazole-Trimethoprim Hives    Augmentin [Amoxicillin-Pot Clavulanate] Vomiting    Lasix [Furosemide]     Tape Swelling       Social Hx:   Social History     Socioeconomic History    Marital status:      Spouse name: Not on file    Number of children: Not on file    Years of education: Not on file    Highest education level: Not on file   Occupational History    Not on file   Tobacco Use    Smoking status: Former     Types: Cigarettes    Smokeless tobacco: Never   Vaping Use    Vaping status: Never Used   Substance and Sexual Activity    Alcohol use: Not Currently     Alcohol/week: 0.0 oz     Comment: Occasionally    Drug use: Not Currently     Types: Marijuana, Oral     Comment: cbd occ    Sexual activity: Not on file   Other Topics Concern    Not on file   Social History Narrative    Not on file     Social Drivers of Health     Financial Resource Strain: Not on file   Food Insecurity: Not on file   Transportation Needs: Not on file   Physical Activity: Not on file   Stress: Not on file   Social Connections: Not on file   Intimate Partner Violence: Not on file   Housing Stability: Not on file         EXAMINATION     Physical Exam:   Vitals: There were no vitals taken for this visit.    Physical Exam         Constitutional:   Body Habitus: There is no height or weight on file to calculate BMI.  Cooperation: Fully cooperates with exam  Appearance: Well-groomed, well-nourished  Respiratory:  Breathing comfortable on room air, no audible wheezing  Cardiovascular: Skin appears  well-perfused  Psychiatric: Appropriate affect  Gait: normal gait, no use of ambulatory device, nonantalgic. {ckwalkin}. ***    Neurologic:  Strength:    Bilateral UE 5/5 in shoulder abductors, elbow extensors and flexors, wrist extensors, finger abductors, and finger flexors   Bilateral LE 5/5 in hip flexors, knee extensors and flexors, ankle dorsiflexors and plantarflexors, great toe extensors   Reflexes: 2+ in bilateral patella and achilles (brachioradialis, biceps, triceps)   Sensation: grossly intact bilaterally dermatomes L3-S1 (C5-T1)   MSK:?No?TTP across axial spinous processes and paraspinals bilaterally, has?preserved?active lumbar ROM with pain with ***     Special?tests:    Negative slump test bilaterally   Negative FADIR, log roll bilaterally   Negative thigh thrust, TRISTIAN bilaterally   Negative facet loading maneuvers bilaterally       MEDICAL DECISION MAKING    DATA    Labs:   Lab Results   Component Value Date/Time    SODIUM 145 2024 09:33 AM    POTASSIUM 4.3 2024 09:33 AM    CHLORIDE 109 2024 09:33 AM    CO2 25 2024 09:33 AM    GLUCOSE 116 (H) 2024 09:33 AM    BUN 16 2024 09:33 AM    CREATININE 0.65 2024 09:33 AM    BUNCREATRAT 21 2021 07:29 AM        Lab Results   Component Value Date/Time    WBC 6.4 2024 09:33 AM    RBC 5.36 2024 09:33 AM    HEMOGLOBIN 15.0 2024 09:33 AM    HEMATOCRIT 46.1 2024 09:33 AM    MCV 86.0 2024 09:33 AM    MCH 28.0 2024 09:33 AM    MCHC 32.5 2024 09:33 AM    MPV 10.5 2024 09:33 AM    NEUTSPOLYS 47.70 2024 09:33 AM    LYMPHOCYTES 38.10 2024 09:33 AM    MONOCYTES 7.20 2024 09:33 AM    EOSINOPHILS 5.80 2024 09:33 AM    BASOPHILS 0.90 2024 09:33 AM        Lab Results   Component Value Date/Time    HBA1C 5.8 (H) 2024 09:33 AM          Imaging:   I reviewed the following radiology reports                Results for orders placed during the  hospital encounter of 11/23/24     MR-CERVICAL SPINE-W/O     Impression  Mild multilevel degenerative changes as above. No high-grade narrowing.          Results for orders placed during the hospital encounter of 11/23/24     MR-THORACIC SPINE-W/O     Impression  1.  Anterior bridging osteophytes at T7-T8, T8-T9, and T9-10.  2.  Otherwise unremarkable noncontrast MRI of the thoracic spine. No significant spinal canal or neuroforaminal narrowing.      DIAGNOSIS   {No diagnosis found. (Refresh or delete this SmartLink)}      ASSESSMENT and PLAN:     Ashanti Arvizu is a 64 y.o. female who returns to clinic for follow-up of ***.    There are no diagnoses linked to this encounter.    Assessment & Plan        -***    Follow up: {cktime follow up:80118}    Thank you for allowing me to participate in the care of this patient. If you have any questions please not hesitate to contact me.         Please note that this dictation was created using voice recognition software. I have made every reasonable attempt to correct obvious errors but there may be errors of grammar and content that I may have overlooked prior to finalization of this note.    Laurel Corea MD  Interventional Spine and Sports Physiatry  Physical Medicine and Rehabilitation  [facility]   11/23/24     MR-THORACIC SPINE-W/O     Impression  1.  Anterior bridging osteophytes at T7-T8, T8-T9, and T9-10.  2.  Otherwise unremarkable noncontrast MRI of the thoracic spine. No significant spinal canal or neuroforaminal narrowing.      DIAGNOSIS   Visit Diagnoses     ICD-10-CM   1. Mid back pain on right side  M54.9   2. Right leg numbness  R20.0   3. Toe pain, left  M79.675         ASSESSMENT and PLAN:     Ashanti Arvizu is a 64 y.o. female who returns to clinic for follow-up of right mid back pain and left toe and right thigh numbness.    Ashanti was seen today for follow-up.    Diagnoses and all orders for this visit:    Mid back pain on right side    Right leg numbness    Toe pain, left        Assessment & Plan  Right mid back pain  The patient's right mid back pain is currently manageable, with a reported pain level of 1 to 2 out of 10. She has been using lidocaine patches, which provide some relief. She is also taking gabapentin 300 mg at night. She was advised to continue using the lidocaine patches as needed for her mid back. The potential for a trigger point injection was discussed as a future option if the pain becomes more bothersome or persistent. She was reassured about the safety of her current gabapentin regimen and informed that it could be discontinued if she feels it is not providing significant relief.    Right thigh numbness  Left toe numbness  Patient reports good control of discomfort with use of lidocaine cream which we discussed she should continue for symptom management.        Follow up: In 1 month    Thank you for allowing me to participate in the care of this patient. If you have any questions please not hesitate to contact me.         Please note that this dictation was created using voice recognition software. I have made every reasonable attempt to correct obvious errors but there may be errors of grammar and content that I may have overlooked prior to finalization of this note.    Laurel  MD Nhaomy  Interventional Spine and Sports Physiatry  Physical Medicine and Rehabilitation  [facility]

## 2025-04-02 ENCOUNTER — APPOINTMENT (OUTPATIENT)
Dept: PHYSICAL MEDICINE AND REHAB | Facility: MEDICAL CENTER | Age: 65
End: 2025-04-02
Payer: COMMERCIAL

## 2025-04-02 VITALS
RESPIRATION RATE: 16 BRPM | OXYGEN SATURATION: 97 % | HEART RATE: 86 BPM | DIASTOLIC BLOOD PRESSURE: 72 MMHG | WEIGHT: 214 LBS | TEMPERATURE: 97.3 F | BODY MASS INDEX: 39.38 KG/M2 | HEIGHT: 62 IN | SYSTOLIC BLOOD PRESSURE: 128 MMHG

## 2025-04-02 DIAGNOSIS — M54.9 MID BACK PAIN ON RIGHT SIDE: Primary | ICD-10-CM

## 2025-04-02 DIAGNOSIS — R20.0 RIGHT LEG NUMBNESS: ICD-10-CM

## 2025-04-02 DIAGNOSIS — M79.675 TOE PAIN, LEFT: ICD-10-CM

## 2025-04-02 PROCEDURE — 3074F SYST BP LT 130 MM HG: CPT | Performed by: STUDENT IN AN ORGANIZED HEALTH CARE EDUCATION/TRAINING PROGRAM

## 2025-04-02 PROCEDURE — 99214 OFFICE O/P EST MOD 30 MIN: CPT | Performed by: STUDENT IN AN ORGANIZED HEALTH CARE EDUCATION/TRAINING PROGRAM

## 2025-04-02 PROCEDURE — 3078F DIAST BP <80 MM HG: CPT | Performed by: STUDENT IN AN ORGANIZED HEALTH CARE EDUCATION/TRAINING PROGRAM

## 2025-04-02 PROCEDURE — 1125F AMNT PAIN NOTED PAIN PRSNT: CPT | Performed by: STUDENT IN AN ORGANIZED HEALTH CARE EDUCATION/TRAINING PROGRAM

## 2025-04-02 ASSESSMENT — FIBROSIS 4 INDEX: FIB4 SCORE: 1.66

## 2025-04-02 ASSESSMENT — PATIENT HEALTH QUESTIONNAIRE - PHQ9
5. POOR APPETITE OR OVEREATING: 1 - SEVERAL DAYS
SUM OF ALL RESPONSES TO PHQ QUESTIONS 1-9: 8
CLINICAL INTERPRETATION OF PHQ2 SCORE: 1

## 2025-04-02 ASSESSMENT — PAIN SCALES - GENERAL: PAINLEVEL_OUTOF10: 1=MINIMAL PAIN

## 2025-04-20 DIAGNOSIS — M54.9 UPPER BACK PAIN ON RIGHT SIDE: ICD-10-CM

## 2025-04-20 DIAGNOSIS — M54.2 NECK PAIN ON RIGHT SIDE: ICD-10-CM

## 2025-04-20 DIAGNOSIS — M79.10 MYALGIA: ICD-10-CM

## 2025-04-22 RX ORDER — CYCLOBENZAPRINE HCL 5 MG
5 TABLET ORAL 2 TIMES DAILY PRN
Qty: 60 TABLET | Refills: 0 | Status: SHIPPED | OUTPATIENT
Start: 2025-04-22

## 2025-04-22 NOTE — TELEPHONE ENCOUNTER
cyclobenzaprine (FLEXERIL) 5 mg tablet     Received request via: Pharmacy    Was the patient seen in the last year in this department? Yes    Does the patient have an active prescription (recently filled or refills available) for medication(s) requested?  Yes    Pharmacy Name: [id number]    Does the patient have [facility] and need 100-day supply? (This applies to ALL medications) Patient does not have SCP

## 2025-05-01 ENCOUNTER — TELEPHONE (OUTPATIENT)
Dept: PHYSICAL THERAPY | Facility: MEDICAL CENTER | Age: 65
End: 2025-05-01
Payer: COMMERCIAL

## 2025-05-01 DIAGNOSIS — F41.1 GAD (GENERALIZED ANXIETY DISORDER): ICD-10-CM

## 2025-05-01 NOTE — OP THERAPY DISCHARGE SUMMARY
Outpatient Physical Therapy  DISCHARGE SUMMARY NOTE      [facility] Outpatient Physical Therapy  [address]  [place] [address]  Phone:  [phone]  Fax:  [phone]    Date of Visit: 05/01/2025    Patient: Ashanti Arvizu  YOB: 1960  MRN: [id number]     Referring Provider: Laurel Corea M.D.  [address]  [address]  [place] [address]   Referring Diagnosis Mid back pain on right side [M54.9];Post herpetic neuralgia [B02.29]          Functional Assessment Used        Your patient is being discharged from Physical Therapy with the following comments:   Patient has failed to schedule or reschedule follow-up visits    Comments:  Pt seen for eval only on 12/9/24     Limitations Remaining:  Please see eval; current limitations unknown.    Recommendations:  Pt has not been seen since initial evaluation >30 days. Pt has failed to schedule additional follow ups. Per policy, pt will need to be seen by PCP or acquire another referral prior to initiating skilled physical therapy. Pt is being discharged at this time.      Byron Durand, PT    Date: 5/1/2025

## 2025-05-02 RX ORDER — DULOXETIN HYDROCHLORIDE 30 MG/1
30 CAPSULE, DELAYED RELEASE ORAL DAILY
Qty: 90 CAPSULE | Refills: 1 | Status: SHIPPED | OUTPATIENT
Start: 2025-05-02

## 2025-05-07 ENCOUNTER — APPOINTMENT (OUTPATIENT)
Dept: PHYSICAL MEDICINE AND REHAB | Facility: MEDICAL CENTER | Age: 65
End: 2025-05-07
Payer: COMMERCIAL

## 2025-05-21 DIAGNOSIS — M79.10 MYALGIA: ICD-10-CM

## 2025-05-21 DIAGNOSIS — M54.9 UPPER BACK PAIN ON RIGHT SIDE: ICD-10-CM

## 2025-05-21 DIAGNOSIS — M54.2 NECK PAIN ON RIGHT SIDE: ICD-10-CM

## 2025-05-21 RX ORDER — CYCLOBENZAPRINE HCL 5 MG
5 TABLET ORAL 2 TIMES DAILY PRN
Qty: 60 TABLET | Refills: 0 | Status: SHIPPED | OUTPATIENT
Start: 2025-05-21

## 2025-05-21 NOTE — TELEPHONE ENCOUNTER
cyclobenzaprine (FLEXERIL) 5 MG tablet     Received request via: Pharmacy    Was the patient seen in the last year in this department? Yes    Does the patient have an active prescription (recently filled or refills available) for medication(s) requested? yes    Pharmacy Name: [facility]    Does the patient have [facility] and need 100-day supply? (This applies to ALL medications) Patient does not have SCP

## 2025-05-28 DIAGNOSIS — E89.0 POSTABLATIVE HYPOTHYROIDISM: ICD-10-CM

## 2025-05-28 RX ORDER — LEVOTHYROXINE SODIUM 112 UG/1
112 TABLET ORAL
Qty: 90 TABLET | Refills: 1 | Status: SHIPPED | OUTPATIENT
Start: 2025-05-28

## 2025-06-09 RX ORDER — HYDROCHLOROTHIAZIDE 12.5 MG/1
12.5 CAPSULE ORAL DAILY
Qty: 90 CAPSULE | Refills: 1 | Status: SHIPPED | OUTPATIENT
Start: 2025-06-09

## 2025-06-17 DIAGNOSIS — M79.10 MYALGIA: ICD-10-CM

## 2025-06-17 DIAGNOSIS — M54.2 NECK PAIN ON RIGHT SIDE: ICD-10-CM

## 2025-06-17 DIAGNOSIS — M54.9 UPPER BACK PAIN ON RIGHT SIDE: ICD-10-CM

## 2025-06-17 RX ORDER — CYCLOBENZAPRINE HCL 5 MG
5 TABLET ORAL 2 TIMES DAILY PRN
Qty: 60 TABLET | Refills: 0 | Status: SHIPPED | OUTPATIENT
Start: 2025-06-17

## 2025-06-17 NOTE — TELEPHONE ENCOUNTER
cyclobenzaprine (FLEXERIL) 5 MG tablet     Received request via: Pharmacy    Was the patient seen in the last year in this department? Yes    Does the patient have an active prescription (recently filled or refills available) for medication(s) requested? Yes    Pharmacy Name: [id number]    Does the patient have [facility] and need 100-day supply? (This applies to ALL medications) Patient does not have SCP

## 2025-06-23 ENCOUNTER — OFFICE VISIT (OUTPATIENT)
Dept: MEDICAL GROUP | Facility: LAB | Age: 65
End: 2025-06-23
Payer: COMMERCIAL

## 2025-06-23 VITALS
HEART RATE: 86 BPM | RESPIRATION RATE: 16 BRPM | HEIGHT: 62 IN | SYSTOLIC BLOOD PRESSURE: 138 MMHG | WEIGHT: 204.59 LBS | OXYGEN SATURATION: 94 % | TEMPERATURE: 97.5 F | DIASTOLIC BLOOD PRESSURE: 78 MMHG | BODY MASS INDEX: 37.65 KG/M2

## 2025-06-23 DIAGNOSIS — M25.571 CHRONIC PAIN OF RIGHT ANKLE: Primary | ICD-10-CM

## 2025-06-23 DIAGNOSIS — G89.29 CHRONIC PAIN OF RIGHT ANKLE: Primary | ICD-10-CM

## 2025-06-23 DIAGNOSIS — M67.40 GANGLION CYST: ICD-10-CM

## 2025-06-23 PROCEDURE — 99214 OFFICE O/P EST MOD 30 MIN: CPT | Performed by: PHYSICIAN ASSISTANT

## 2025-06-23 PROCEDURE — 3075F SYST BP GE 130 - 139MM HG: CPT | Performed by: PHYSICIAN ASSISTANT

## 2025-06-23 PROCEDURE — 3078F DIAST BP <80 MM HG: CPT | Performed by: PHYSICIAN ASSISTANT

## 2025-06-23 ASSESSMENT — FIBROSIS 4 INDEX: FIB4 SCORE: 1.69

## 2025-06-23 NOTE — PROGRESS NOTES
"Subjective:     CC: Right ankle pain    HPI:   Ashanti here today with   Verbal consent was acquired by the patient to use [employer] ambient listening note generation during this visit Yes     History of Present Illness  The patient presents with concerns regarding a bump on their toe and associated symptoms.    Right ankle pain  - Present for 3-4 weeks.  - Accompanied by tightness during stretching.  - No recent falls, injuries, excessive walking, or changes in physical activity reported.  - Mobility unaffected; the patient walks without difficulty.  - The area is neither warm nor red, with no pain on palpation.  - OTC analgesics provide partial relief.    Supplemental information: History of neuropathy in both feet and prior imaging for a broken bone in the other foot.         ROS:  All ROS negative except for pertinent positives listed above.       Current Medications and Prescriptions Ordered in Epic[1]      Objective:     Exam:  /78 (BP Location: Left arm, Patient Position: Sitting, BP Cuff Size: Large adult)   Pulse 86   Temp 36.4 °C (97.5 °F) (Temporal)   Resp 16   Ht 1.575 m (5' 2\")   Wt 92.8 kg (204 lb 9.4 oz)   SpO2 94%   BMI 37.42 kg/m²  Body mass index is 37.42 kg/m².    Gen: Alert and oriented, No apparent distress.  Neck: Neck is supple without lymphadenopathy.  Lungs: Normal effort, CTA bilaterally, no wheezes, rhonchi, or rales  CV: Regular rate and rhythm. No murmurs, rubs, or gallops.  Ext: No clubbing, cyanosis, edema.  Physical Exam  Musculoskeletal:      Right foot: Normal range of motion. No deformity, bunion, Charcot foot, foot drop or prominent metatarsal heads.      Left foot: Normal range of motion. No deformity, bunion, Charcot foot, foot drop or prominent metatarsal heads.   Feet:      Right foot:      Skin integrity: Skin integrity normal.      Left foot:      Skin integrity: Skin integrity normal.      Comments: Tender to palpation just posterior of lateral malleolus.  No " erythema, bruising, swelling appreciated.  Superficial slightly rubbery textured approximately 1 cm diameter cyst/nodule noted on dorsal aspect of the big toe of the right foot          Assessment & Plan:     65 y.o. female with the following -     1. Chronic pain of right ankle (Primary)  No alarm symptoms on physical exam.  Patient is able to bear weight on affected foot/ankle, suspect possible arthritic component.  Diagnostic plan: Ordered foot and ankle x-ray.  Treatment plan: Advised ibuprofen, ice post-activity, foot elevation, and compressive sleeve use.  Clinical decision making: Consider referral to podiatrist if poor response to conservative measures  - DX-FOOT-COMPLETE 3+ LEFT; Future  - DX-ANKLE 3+ VIEWS RIGHT; Future    2.  Ganglion cyst  Symptoms and physical exam findings consistent with ganglion cyst.  Largely asymptomatic, continue to monitor      I spent a total of 14 minutes with record review, exam, communication with the patient, communication with other providers, and documentation of this encounter.      No follow-ups on file.    Please note that this dictation was created using voice recognition software. I have made every reasonable attempt to correct obvious errors, but there may be errors of grammar and possibly content that I did not discover before finalizing the note.             [1]   Current Outpatient Medications Ordered in Epic   Medication Sig Dispense Refill    cyclobenzaprine (FLEXERIL) 5 MG tablet TAKE 1 TABLET BY MOUTH 2 TIMES A DAY AS NEEDED FOR MILD PAIN, MODERATE PAIN OR MUSCLE SPASMS FOR UP TO 60 DOSES. 60 Tablet 0    hydrochlorothiazide (MICROZIDE) 12.5 MG capsule TAKE 1 CAPSULE BY MOUTH EVERY DAY 90 Capsule 1    levothyroxine (SYNTHROID) 112 MCG Tab TAKE 1 TABLET BY MOUTH EVERY DAY IN THE MORNING ON AN EMPTY STOMACH 90 Tablet 1    DULoxetine (CYMBALTA) 30 MG Cap DR Particles TAKE 1 CAPSULE BY MOUTH EVERY DAY 90 Capsule 1    Nirmatrelvir&Ritonavir 300/100 20 x 150 MG & 10  x 100MG Tablet Therapy Pack Take 300 mg nirmatrelvir (two 150 mg tablets) with 100 mg ritonavir (one 100 mg tablet) by mouth, with all three tablets taken together twice daily for 5 days. 30 Each 0    albuterol 108 (90 Base) MCG/ACT Aero Soln inhalation aerosol Inhale 2 Puffs every four hours as needed for Shortness of Breath. 1 Each 0    metFORMIN (GLUCOPHAGE) 500 MG Tab TAKE 1 TABLET BY MOUTH TWICE A DAY WITH FOOD 180 Tablet 1    gabapentin (NEURONTIN) 300 MG Cap Take 1 Capsule by mouth every evening. 90 Capsule 1    hydrOXYzine HCl (ATARAX) 25 MG Tab TAKE 1 TABLET BY MOUTH EVERYDAY AT BEDTIME 90 Tablet 1    levothyroxine (SYNTHROID) 125 MCG Tab TAKE 1 TABLET BY MOUTH EVERY DAY IN THE MORNING ON AN EMPTY STOMACH 90 Tablet 0    Tirzepatide-Weight Management 2.5 MG/0.5ML Solution Auto-injector Inject 2.5 mg under the skin every 7 days. 6 mL 0    ibuprofen (MOTRIN) 600 MG Tab Take 600 mg by mouth every 6 hours as needed.      Zoster Vac Recomb Adjuvanted (SHINGRIX) 50 MCG/0.5ML Recon Susp Inject 0.5 mL into the shoulder, thigh, or buttocks. (Patient not taking: Reported on 6/23/2025) 0.5 mL 0    influenza vaccine High-Dose (FLUZONE HIGH-DOSE) 0.5 ML Suspension Prefilled Syringe injection Inject 0.5 mL into the shoulder, thigh, or buttocks. (Patient not taking: Reported on 6/23/2025) 0.5 mL 0     No current Epic-ordered facility-administered medications on file.

## 2025-07-21 DIAGNOSIS — F41.1 GAD (GENERALIZED ANXIETY DISORDER): ICD-10-CM

## 2025-07-21 RX ORDER — HYDROXYZINE HYDROCHLORIDE 25 MG/1
25 TABLET, FILM COATED ORAL
Qty: 30 TABLET | Refills: 5 | Status: SHIPPED | OUTPATIENT
Start: 2025-07-21

## 2025-07-22 DIAGNOSIS — M79.10 MYALGIA: ICD-10-CM

## 2025-07-22 DIAGNOSIS — M54.9 UPPER BACK PAIN ON RIGHT SIDE: ICD-10-CM

## 2025-07-22 DIAGNOSIS — M54.2 NECK PAIN ON RIGHT SIDE: ICD-10-CM

## 2025-07-22 RX ORDER — CYCLOBENZAPRINE HCL 5 MG
5 TABLET ORAL 2 TIMES DAILY PRN
Qty: 60 TABLET | Refills: 0 | Status: SHIPPED | OUTPATIENT
Start: 2025-07-22

## 2025-07-22 RX ORDER — GABAPENTIN 300 MG/1
300 CAPSULE ORAL EVERY EVENING
Qty: 30 CAPSULE | Refills: 5 | Status: SHIPPED | OUTPATIENT
Start: 2025-07-22

## 2025-08-22 DIAGNOSIS — M79.10 MYALGIA: ICD-10-CM

## 2025-08-22 DIAGNOSIS — M54.9 UPPER BACK PAIN ON RIGHT SIDE: ICD-10-CM

## 2025-08-22 DIAGNOSIS — M54.2 NECK PAIN ON RIGHT SIDE: ICD-10-CM

## 2025-08-22 RX ORDER — CYCLOBENZAPRINE HCL 5 MG
5 TABLET ORAL 2 TIMES DAILY PRN
Qty: 60 TABLET | Refills: 0 | Status: SHIPPED | OUTPATIENT
Start: 2025-08-22

## 2025-08-28 ENCOUNTER — OFFICE VISIT (OUTPATIENT)
Dept: MEDICAL GROUP | Age: 65
End: 2025-08-28
Payer: COMMERCIAL

## 2025-08-28 VITALS
HEART RATE: 83 BPM | BODY MASS INDEX: 38.64 KG/M2 | OXYGEN SATURATION: 96 % | SYSTOLIC BLOOD PRESSURE: 128 MMHG | TEMPERATURE: 98.1 F | HEIGHT: 62 IN | DIASTOLIC BLOOD PRESSURE: 74 MMHG | WEIGHT: 210 LBS

## 2025-08-28 DIAGNOSIS — E78.5 DYSLIPIDEMIA: ICD-10-CM

## 2025-08-28 DIAGNOSIS — Z78.0 POST-MENOPAUSAL: ICD-10-CM

## 2025-08-28 DIAGNOSIS — R73.03 PREDIABETES: ICD-10-CM

## 2025-08-28 DIAGNOSIS — E66.9 OBESITY (BMI 30-39.9): ICD-10-CM

## 2025-08-28 DIAGNOSIS — M54.6 CHRONIC RIGHT-SIDED THORACIC BACK PAIN: Primary | ICD-10-CM

## 2025-08-28 DIAGNOSIS — E89.0 POSTABLATIVE HYPOTHYROIDISM: ICD-10-CM

## 2025-08-28 DIAGNOSIS — G89.29 CHRONIC RIGHT-SIDED THORACIC BACK PAIN: Primary | ICD-10-CM

## 2025-08-28 DIAGNOSIS — Z12.31 ENCOUNTER FOR SCREENING MAMMOGRAM FOR MALIGNANT NEOPLASM OF BREAST: ICD-10-CM

## 2025-08-28 PROCEDURE — 99214 OFFICE O/P EST MOD 30 MIN: CPT | Performed by: STUDENT IN AN ORGANIZED HEALTH CARE EDUCATION/TRAINING PROGRAM

## 2025-08-28 PROCEDURE — 3078F DIAST BP <80 MM HG: CPT | Performed by: STUDENT IN AN ORGANIZED HEALTH CARE EDUCATION/TRAINING PROGRAM

## 2025-08-28 PROCEDURE — 3074F SYST BP LT 130 MM HG: CPT | Performed by: STUDENT IN AN ORGANIZED HEALTH CARE EDUCATION/TRAINING PROGRAM

## 2025-08-28 ASSESSMENT — ENCOUNTER SYMPTOMS
BLURRED VISION: 0
WEAKNESS: 0
SHORTNESS OF BREATH: 0
HEADACHES: 0
DOUBLE VISION: 0
BLOOD IN STOOL: 0
CHILLS: 0
BRUISES/BLEEDS EASILY: 0
COUGH: 0
WHEEZING: 0
ABDOMINAL PAIN: 0
PHOTOPHOBIA: 0
DIZZINESS: 0
DEPRESSION: 0
BACK PAIN: 0
PALPITATIONS: 0
FEVER: 0
ORTHOPNEA: 0

## 2025-08-28 ASSESSMENT — LIFESTYLE VARIABLES: SUBSTANCE_ABUSE: 0

## 2025-08-28 ASSESSMENT — FIBROSIS 4 INDEX: FIB4 SCORE: 1.69
